# Patient Record
Sex: FEMALE | Race: WHITE | Employment: OTHER | URBAN - METROPOLITAN AREA
[De-identification: names, ages, dates, MRNs, and addresses within clinical notes are randomized per-mention and may not be internally consistent; named-entity substitution may affect disease eponyms.]

---

## 2017-01-04 ENCOUNTER — HOSPITAL ENCOUNTER (OUTPATIENT)
Dept: PHYSICAL THERAPY | Age: 82
Discharge: HOME | End: 2017-01-04
Payer: MEDICARE

## 2017-01-04 PROCEDURE — 97112 NEUROMUSCULAR REEDUCATION: CPT

## 2017-01-04 PROCEDURE — 97110 THERAPEUTIC EXERCISES: CPT

## 2017-01-04 NOTE — PROGRESS NOTES
PT DAILY TREATMENT NOTE - [facility]     Patient Name: Cristiane Belle  Date:2017  : 1935  [x]  Patient  Verified  Payor: [place] MEDICARE / Plan: [place] MEDICARE PART A & B / Product Type: Medicare /    In time:2:07  Out time:2:42  Total Treatment Time (min): 35  Total Timed Codes (min): 35  1:1 Treatment Time ( [address] only): 35   Visit #: 2 of 8    Treatment Area: Unsteadiness on feet [R26.81]    SUBJECTIVE  Pain Level (0-10 scale): 0/10  Any medication changes, allergies to medications, adverse drug reactions, diagnosis change, or new procedure performed?: [x] No    [] Yes (see summary sheet for update)  Subjective functional status/changes:   [] No changes reported  The patient reports occasionally catching toe when walking.     OBJECTIVE    Modality rationale:  to improve the patients ability to    Min Type Additional Details    [] Estim:  []Unatt       []IFC  []Premod                        []Other:  []w/ice   []w/heat  Position:  Location:    [] Estim: []Att    []TENS instruct  []NMES                    []Other:  []w/US   []w/ice   []w/heat  Position:  Location:    []  Traction: [] Cervical       []Lumbar                       [] Prone          []Supine                       []Intermittent   []Continuous Lbs:  [] before manual  [] after manual    []  Ultrasound: []Continuous   [] Pulsed                           []1MHz   []3MHz W/cm2:  Location:    []  Iontophoresis with dexamethasone         Location: [] Take home patch   [] In clinic    []  Ice     []  heat  []  Ice massage  []  Laser   []  Anodyne Position:  Location:    []  Laser with stim  []  Other:  Position:  Location:    []  Vasopneumatic Device Pressure:       [] lo [] med [] hi   Temperature: [] lo [] med [] hi   [] Skin assessment post-treatment:  []intact []redness- no adverse reaction    []redness  adverse reaction:      min []Eval                  []Re-Eval       25 min Therapeutic Exercise:  [x] See flow sheet :   Rationale: increase ROM and increase strength to improve the patients ability to improve ADL ease. 10 min Neuromuscular Re-education:  [x]  See flow sheet :   Rationale: improve coordination, improve balance and increase proprioception  to improve the patients ability to improve ADL ease          With   [] TE   [] TA   [] neuro   [] other: Patient Education: [x] Review HEP    [] Progressed/Changed HEP based on:   [] positioning   [] body mechanics   [] transfers   [] heat/ice application    [] other:      Other Objective/Functional Measures:   First folow-up. Instability noted during gab negotiation and dynamic gait. Held issued HEP until ascertaining pt response next follow up. Pain Level (0-10 scale) post treatment: 0/10    ASSESSMENT/Changes in Function: First follow-up. Patient will continue to benefit from skilled PT services to modify and progress therapeutic interventions, address functional mobility deficits, address ROM deficits, address strength deficits, analyze and address soft tissue restrictions, analyze and cue movement patterns, analyze and modify body mechanics/ergonomics, assess and modify postural abnormalities and instruct in home and community integration to attain remaining goals. []  See Plan of Care  []  See progress note/recertification  []  See Discharge Summary         Progress towards goals / Updated goals:  Short Term Goals: To be accomplished in two weeks:  1. Pt will demonstrate compliance with HEP for self management of symptoms. Long Term Goals: To be accomplished in four weeks:  1. Pt will demonstrate FGA to 25 or greater to decrease falls risk in the community. 2. Pt will demonstrate sharpened Rhomberg to 30 seconds with SBA to improve stability over various terrain.   3. Pt will demonstrate FOTO to 64 or greater to indicate improved functional task completion.       PLAN  []  Upgrade activities as tolerated     [x]  Continue plan of care  []  Update interventions per flow sheet []  Discharge due to:_  []  Other:_      Roly Emmanuel, PT 1/4/2017  4:15 PM    Future Appointments  Date Time Provider Cornell Perez   1/6/2017 2:00 PM Geni Gallo, PT [facility]   1/9/2017 3:00 PM Geni Gallo, PT [facility]   1/11/2017 2:30 PM Geni Gallo, PT [facility]   1/16/2017 2:00 PM Geni Gallo, PT [facility]   1/18/2017 2:00 PM Geni Gallo, PT [facility]

## 2017-01-06 ENCOUNTER — HOSPITAL ENCOUNTER (OUTPATIENT)
Dept: PHYSICAL THERAPY | Age: 82
Discharge: HOME | End: 2017-01-06
Payer: MEDICARE

## 2017-01-06 PROCEDURE — 97112 NEUROMUSCULAR REEDUCATION: CPT

## 2017-01-06 PROCEDURE — 97110 THERAPEUTIC EXERCISES: CPT

## 2017-01-06 NOTE — PROGRESS NOTES
PT DAILY TREATMENT NOTE - [facility]     Patient Name: Vinita Davey  Date:2017  : 1935  [x]  Patient  Verified  Payor: [place] MEDICARE / Plan: VA MEDICARE PART A & B / Product Type: Medicare /    In time:2:00  Out time:2:40  Total Treatment Time (min): 40  Total Timed Codes (min): 40  1:1 Treatment Time ( [address] only): 31  Visit #: 3 of 8    Treatment Area: Unsteadiness on feet [R26.81]    SUBJECTIVE  Pain Level (0-10 scale): 0/10  Any medication changes, allergies to medications, adverse drug reactions, diagnosis change, or new procedure performed?: [x] No    [] Yes (see summary sheet for update)  Subjective functional status/changes:   [] No changes reported  \"Looking forward to the snow. I'm trying to keep myself going so I can enjoy the grandkids. \"    OBJECTIVE      10 min Therapeutic Exercise:  [x] See flow sheet :   Rationale: increase ROM and increase strength to improve the patients ability to increase ease of ADL. 30 min Neuromuscular Re-education:  [x]  See flow sheet :   Rationale: increase strength, improve coordination, improve balance and increase proprioception  to improve the patients ability to maintain dynamic standing balance. With   [] TE   [] TA   [] neuro   [] other: Patient Education: [x] Review HEP    [] Progressed/Changed HEP based on:   [] positioning   [] body mechanics   [] transfers   [] heat/ice application    [] other:      Other Objective/Functional Measures:   Sharps Rhomberg 30\" little sway  - SBA  Cuing Required for 3  Way hip form  Self corrects with supine exercises. Pain Level (0-10 scale) post treatment: 0/10    ASSESSMENT/Changes in Function: Sharps Rhomberg performed without Airex and Modified Sharps Rhomberg performed with little sway and progressed to position 3 with Eyes open and closed on Airex, indicating decreased fall risk with supervision.  Patient demonstrates good ambulation ability with Eyes open and can walk 30' in a straight line with eyes closed. Patient will continue to benefit from skilled PT services to modify and progress therapeutic interventions, address functional mobility deficits, address ROM deficits, address strength deficits, analyze and address soft tissue restrictions, analyze and cue movement patterns, analyze and modify body mechanics/ergonomics and assess and modify postural abnormalities to attain remaining goals. []  See Plan of Care  []  See progress note/recertification  []  See Discharge Summary         Progress towards goals / Updated goals:  Short Term Goals: To be accomplished in two weeks:  1. Pt will demonstrate compliance with HEP for self management of symptoms. Long Term Goals: To be accomplished in four weeks:  1. Pt will demonstrate FGA to 25 or greater to decrease falls risk in the community. 2. Pt will demonstrate sharpened Rhomberg to 30 seconds with SBA to improve stability over various terrain. Met 1/6/2017  3. Pt will demonstrate FOTO to 64 or greater to indicate improved functional task completion.      PLAN  [x]  Upgrade activities as tolerated     [x]  Continue plan of care  [x]  Update interventions per flow sheet       []  Discharge due to:_  []  Other:_  May progress to carioca walking     Torri Rodriguez 1/6/2017  9:56 AM    Future Appointments  Date Time Provider Cornell Perez   1/6/2017 2:00 PM [facility]   1/9/2017 3:00 PM [facility]   1/11/2017 2:30 PM [facility]   1/16/2017 2:00 PM [facility]   1/18/2017 2:00 PM [facility]

## 2017-01-09 ENCOUNTER — APPOINTMENT (OUTPATIENT)
Dept: PHYSICAL THERAPY | Age: 82
End: 2017-01-09
Payer: MEDICARE

## 2017-01-11 ENCOUNTER — HOSPITAL ENCOUNTER (OUTPATIENT)
Dept: PHYSICAL THERAPY | Age: 82
End: 2017-01-11
Payer: MEDICARE

## 2017-01-16 ENCOUNTER — HOSPITAL ENCOUNTER (OUTPATIENT)
Dept: PHYSICAL THERAPY | Age: 82
Discharge: HOME | End: 2017-01-16
Payer: MEDICARE

## 2017-01-16 PROCEDURE — 97112 NEUROMUSCULAR REEDUCATION: CPT

## 2017-01-16 PROCEDURE — 97110 THERAPEUTIC EXERCISES: CPT

## 2017-01-16 NOTE — PROGRESS NOTES
PT DAILY TREATMENT NOTE - [facility]    Patient Name: Jacquie Zazueta  Date:2017  : 1935  [x]  Patient  Verified  Payor: [place] MEDICARE / Plan: VA MEDICARE PART A & B / Product Type: Medicare /    In time:1405  Out time:1438  Total Treatment Time (min): 33  Total Timed Codes (min): 33  1:1 Treatment Time ([facility] only): 33   Visit #: 4 of 8    Treatment Area: Unsteadiness on feet [R26.81]    SUBJECTIVE  Pain Level (0-10 scale): 0  Any medication changes, allergies to medications, adverse drug reactions, diagnosis change, or new procedure performed?: [x] No    [] Yes (see summary sheet for update)  Subjective functional status/changes:   [] No changes reported  Pt reports having a busy weekend. OBJECTIVE     min []Eval                  []Re-Eval     23 min Neuromuscular Re-education:  [x]  See flow sheet :   Rationale: increase strength, improve coordination, improve balance and increase proprioception  to improve the patients ability to improve stability           10 min Therapeutic Exercise:  [x] See flow sheet :   Rationale: increase ROM and increase strength to improve the patients ability to perform ADLs    With   [x] TE   [] TA   [] neuro   [] other: Patient Education: [x] Review HEP    [x] Progressed/Changed HEP based on:   [] positioning   [] body mechanics   [] transfers   [] heat/ice application    [x] other: strengthening      Other Objective/Functional Measures: issued HEP; more instability noted with  Tandem walking    Good gab clearance     Pain Level (0-10 scale) post treatment: 0    ASSESSMENT/Changes in Function:   Pt demonstrates excellent progress with PT thus far, demonstrating improved standing balance and strengthening. She is aware of deficits, and focuses on each exercise for improvement. She reports finding a pair of shoes that she likes better [employer]), and she does not feel like she is tripping over her toes as much. Issued HEP with red band.     Patient will continue to benefit from skilled PT services to modify and progress therapeutic interventions, address functional mobility deficits, address ROM deficits, address strength deficits, analyze and address soft tissue restrictions, analyze and cue movement patterns, analyze and modify body mechanics/ergonomics, assess and modify postural abnormalities and instruct in home and community integration to attain remaining goals. []  See Plan of Care  []  See progress note/recertification  []  See Discharge Summary         Progress towards goals / Updated goals:  Short Term Goals: To be accomplished in two weeks:  1. Pt will demonstrate compliance with HEP for self management of symptoms. Issued HEP 01/16/2017  Long Term Goals: To be accomplished in four weeks:  1. Pt will demonstrate FGA to 25 or greater to decrease falls risk in the community. 2. Pt will demonstrate sharpened Rhomberg to 30 seconds with SBA to improve stability over various terrain. Met 1/6/2017  3. Pt will demonstrate FOTO to 64 or greater to indicate improved functional task completion.     PLAN  [x]  Upgrade activities as tolerated     [x]  Continue plan of care  [x]  Update interventions per flow sheet       []  Discharge due to:_  []  Other:_      Daniel Fine, PT 1/16/2017  2:14 PM    Future Appointments  Date Time Provider Cornell Perez   1/18/2017 2:00 PM [facility]

## 2017-01-18 ENCOUNTER — HOSPITAL ENCOUNTER (OUTPATIENT)
Dept: PHYSICAL THERAPY | Age: 82
Discharge: HOME | End: 2017-01-18
Payer: MEDICARE

## 2017-01-18 PROCEDURE — 97110 THERAPEUTIC EXERCISES: CPT

## 2017-01-18 PROCEDURE — 97112 NEUROMUSCULAR REEDUCATION: CPT

## 2017-01-18 NOTE — PROGRESS NOTES
PT DAILY TREATMENT NOTE - [facility]    Patient Name: Miki Bachelor  Date:2017  : 1935  [x]  Patient  Verified  Payor: [place] MEDICARE / Plan: VA MEDICARE PART A & B / Product Type: Medicare /    In time:1404  Out time:1443  Total Treatment Time (min): 39  Total Timed Codes (min): 39  1:1 Treatment Time ( [address] only): 25   Visit #: 5 of 8    Treatment Area: Unsteadiness on feet [R26.81]    SUBJECTIVE  Pain Level (0-10 scale): 0  Any medication changes, allergies to medications, adverse drug reactions, diagnosis change, or new procedure performed?: [x] No    [] Yes (see summary sheet for update)  Subjective functional status/changes:   [] No changes reported  \"Pretty good, I think. \"    OBJECTIVE     min []Eval                  []Re-Eval     24 min Therapeutic Exercise:  [x] See flow sheet :   Rationale: increase ROM and increase strength to improve the patients ability to perform ADLs    15 min Neuromuscular Re-education:  [x]  See flow sheet :   Rationale: increase strength, improve coordination, improve balance and increase proprioception  to improve the patients ability to improve stability           With   [] TE   [] TA   [] neuro   [] other: Patient Education: [x] Review HEP    [] Progressed/Changed HEP based on:   [] positioning   [] body mechanics   [] transfers   [] heat/ice application    [] other:      Other Objective/Functional Measures:   Improved balance (60 seconds in Rhomberg with EC and MSR with EO)  Improved tandem walk  Added lateral walk   Added more hurdles and closer together  FOTO 57 (1 point improvement)    Pain Level (0-10 scale) post treatment: 0    ASSESSMENT/Changes in Function:   Pt demonstrates improving balance in her new tennis shoes. While she requires SBA with longer balance time (60\"), she is able to still perform well overall. Added more hurdles and decreased space between several, and pt demonstrated L hip circumduction.   FOTO 57.  She reports more confidence when walking in the yard or playing with the dog. Patient will continue to benefit from skilled PT services to modify and progress therapeutic interventions, address functional mobility deficits, address ROM deficits, address strength deficits, analyze and address soft tissue restrictions, analyze and cue movement patterns, analyze and modify body mechanics/ergonomics, assess and modify postural abnormalities and instruct in home and community integration to attain remaining goals. []  See Plan of Care  []  See progress note/recertification  []  See Discharge Summary         Progress towards goals / Updated goals:  Short Term Goals: To be accomplished in two weeks:  1. Pt will demonstrate compliance with HEP for self management of symptoms. Issued HEP 01/16/2017  Long Term Goals: To be accomplished in four weeks:  1. Pt will demonstrate FGA to 25 or greater to decrease falls risk in the community. 2. Pt will demonstrate sharpened Rhomberg to 30 seconds with SBA to improve stability over various terrain. Met 1/6/2017  3. Pt will demonstrate FOTO to 64 or greater to indicate improved functional task completion. 57 01/18/2017    PLAN  [x]  Upgrade activities as tolerated     [x]  Continue plan of care  [x]  Update interventions per flow sheet       []  Discharge due to:_  []  Other:_      Mariano Villarreal, PT 1/18/2017  2:14 PM    No future appointments.

## 2017-01-23 ENCOUNTER — HOSPITAL ENCOUNTER (OUTPATIENT)
Dept: PHYSICAL THERAPY | Age: 82
Discharge: HOME | End: 2017-01-23
Payer: MEDICARE

## 2017-01-23 PROCEDURE — 97116 GAIT TRAINING THERAPY: CPT

## 2017-01-23 PROCEDURE — 97112 NEUROMUSCULAR REEDUCATION: CPT

## 2017-01-23 PROCEDURE — 97110 THERAPEUTIC EXERCISES: CPT

## 2017-01-23 NOTE — PROGRESS NOTES
PT DAILY TREATMENT NOTE - [facility]     Patient Name: Loulou Kimball  Date:2017  : 1935  [x]  Patient  Verified  Payor: [place] MEDICARE / Plan: VA MEDICARE PART A & B / Product Type: Medicare /    In time:12:40  Out time:1:23  Total Treatment Time (min): 43  Total Timed Codes (min): 43  1:1 Treatment Time ([address] only): 44  Visit #:  7 of 8    Treatment Area: Unsteadiness on feet [R26.81]    SUBJECTIVE  Pain Level (0-10 scale): 0/10  Any medication changes, allergies to medications, adverse drug reactions, diagnosis change, or new procedure performed?: [x] No    [] Yes (see summary sheet for update)  Subjective functional status/changes:   [] No changes reported  \"I feel unsteady going down the stairs. \" Patient reports she doesn't trust herself going down the steps. OBJECTIVE      23 min Therapeutic Exercise:  [x] See flow sheet :   Rationale: increase ROM and increase strength to improve the patients ease of ADL. 12 min Neuromuscular Re-education:  [x]  See flow sheet :   Rationale: increase strength, improve coordination, improve balance and increase proprioception  to improve the patients ability to perform functional activities. 8 min Gait Trainin feet with No assistive device on level surfaces with contact guard level of assist   Rationale: to improve balance and proprioception to safely ambulate at home. With   [] TE   [] TA   [] neuro   [] other: Patient Education: [x] Review HEP    [] Progressed/Changed HEP based on:   [] positioning   [] body mechanics   [] transfers   [] heat/ice application    [] other:      Other Objective/Functional Measures: Quad compensation with abduction SLR  In standing . Able to self correct form with standing and plinth exercises after initial cues. Patient demo's sway and bounce with Rhomberg and MSR.     Pain Level (0-10 scale) post treatment: 0/10    ASSESSMENT/Changes in Function: Patient able to self correct form to decrease amount of cuing required. Patient challenged with Rhomberg and MSR eyes closed requiring CGA for proprioceptive input to maintain balance. Patient will continue to benefit from skilled PT services to modify and progress therapeutic interventions, address functional mobility deficits, address ROM deficits, address strength deficits, analyze and cue movement patterns, analyze and modify body mechanics/ergonomics, assess and modify postural abnormalities and address imbalance/dizziness to attain remaining goals. []  See Plan of Care  []  See progress note/recertification  []  See Discharge Summary         Progress towards goals / Updated goals:  1. Pt will demonstrate compliance with HEP for self management of symptoms. Issued HEP 01/16/2017  Long Term Goals: To be accomplished in four weeks:  1. Pt will demonstrate FGA to 25 or greater to decrease falls risk in the community. 2. Pt will demonstrate sharpened Rhomberg to 30 seconds with SBA to improve stability over various terrain. Met 1/6/2017  3. Pt will demonstrate FOTO to 64 or greater to indicate improved functional task completion.  57 01/18/2017    PLAN  []  Upgrade activities as tolerated     [x]  Continue plan of care  []  Update interventions per flow sheet       []  Discharge due to:_  [x]  Other:_   Perform FGA Next visit to assess LTG #2    Yarely Read 1/23/2017  12:49 PM    Future Appointments  Date Time Provider Cornell Perez   1/27/2017 2:30 [facility]   1/31/2017 2:30 PM [facility]

## 2017-01-27 ENCOUNTER — HOSPITAL ENCOUNTER (OUTPATIENT)
Dept: PHYSICAL THERAPY | Age: 82
Discharge: HOME | End: 2017-01-27
Payer: MEDICARE

## 2017-01-27 PROCEDURE — 97112 NEUROMUSCULAR REEDUCATION: CPT

## 2017-01-27 PROCEDURE — 97110 THERAPEUTIC EXERCISES: CPT

## 2017-01-27 NOTE — PROGRESS NOTES
PT DAILY TREATMENT NOTE - [facility]    Patient Name: Raquel Melendez  Date:2017  : 1935  [x]  Patient  Verified  Payor: [place] MEDICARE / Plan: VA MEDICARE PART A & B / Product Type: Medicare /    In time:1435  Out time:1511  Total Treatment Time (min): 36  Total Timed Codes (min): 36  1:1 Treatment Time ( [address] only): 36   Visit #: 7 of 8    Treatment Area: Unsteadiness on feet [R26.81]    SUBJECTIVE  Pain Level (0-10 scale): 0  Any medication changes, allergies to medications, adverse drug reactions, diagnosis change, or new procedure performed?: [x] No    [] Yes (see summary sheet for update)  Subjective functional status/changes:   [] No changes reported  Pt reports no pain. OBJECTIVE     min []Eval                  []Re-Eval     26 min Therapeutic Exercise:  [x] See flow sheet :   Rationale: increase ROM and increase strength to improve the patients ability to perform ADLs    10 min Neuromuscular Re-education:  [x]  See flow sheet :   Rationale: increase strength, improve coordination, improve balance and increase proprioception  to improve the patients ability to improve community stability           With   [] TE   [] TA   [] neuro   [] other: Patient Education: [x] Review HEP    [] Progressed/Changed HEP based on:   [] positioning   [] body mechanics   [] transfers   [] heat/ice application    [] other:      Other Objective/Functional Measures:   FOTO 57  FGA 26/30     Pain Level (0-10 scale) post treatment: 0    ASSESSMENT/Changes in Function:   Pt demonstrates excellent progress in PT thus far. FOTO improved to 57, FGA improved 3 points to 26/30. She is compliant with HEP. Improved balance with sharpened Rhomberg on airex without assistance required. Will continue x1 visit and plan to d/c to independent HEP.     Patient will continue to benefit from skilled PT services to modify and progress therapeutic interventions, address functional mobility deficits, address ROM deficits, address strength deficits, analyze and address soft tissue restrictions, analyze and cue movement patterns, analyze and modify body mechanics/ergonomics, assess and modify postural abnormalities and instruct in home and community integration to attain remaining goals. []  See Plan of Care  [x]  See progress note/recertification  []  See Discharge Summary         Progress towards goals / Updated goals:  Short Term Goals: To be accomplished in two weeks:  1. Pt will demonstrate compliance with HEP for self management of symptoms. Issued HEP 01/16/2017; compliant 01/27/2017  Long Term Goals: To be accomplished in four weeks:  1. Pt will demonstrate FGA to 25 or greater to decrease falls risk in the community. Met, 26/30 01/27/2017  2. Pt will demonstrate sharpened Rhomberg to 30 seconds with SBA to improve stability over various terrain. Met 1/6/2017  3. Pt will demonstrate FOTO to 64 or greater to indicate improved functional task completion.  57 01/18/2017    PLAN  [x]  Upgrade activities as tolerated     [x]  Continue plan of care  [x]  Update interventions per flow sheet       []  Discharge due to:_  []  Other:_      Neeraj Reynoso PT 1/27/2017  2:41 PM    Future Appointments  Date Time Provider Cornell Perez   1/31/2017 2:30 PM Neeraj Reynoso PT [facility]

## 2017-01-27 NOTE — PROGRESS NOTES
[facility]  [address]  [place], [address]  [phone] fax    Continued Plan of Care/ Re-certification for Physical Therapy Services    Patient name: Karey Patch of Care: 2016   Referral source: Nico Carrington MD : 1935    Medical Diagnosis: Unsteadiness on feet [R26.81] Onset Date:Dec 2016    Treatment Diagnosis: Imbalance, general deconditioning   Prior Hospitalization: see medical history Provider#: [id number]   Medications: Verified on Patient summary List   Comorbidities: thyroid problems, glasses, h/o cataracts surgery, h/o glaucoma, h/o macular degeneration, MVP  Prior Level of Function: Likes to work outside, walk her dog, h/o falls    Visits from Start of Care: 7    Missed Visits: 0    The Plan of Care and following information is based on the patient's current status:  Short Term Goals: To be accomplished in two weeks:  1. Pt will demonstrate compliance with HEP for self management of symptoms. Issued HEP 2017; compliant 2017  Long Term Goals: To be accomplished in four weeks:  1. Pt will demonstrate FGA to 25 or greater to decrease falls risk in the community. Met, 2017  2. Pt will demonstrate sharpened Rhomberg to 30 seconds with SBA to improve stability over various terrain. Met 2017  3. Pt will demonstrate FOTO to 64 or greater to indicate improved functional task completion.  57 2017    Key functional changes:   FOTO 57  FGA 26  HEP: compliant  Sharpened Rhomberg: able to hold 60 seconds on airex without assistance    Problems/ barriers to goal attainment: Proprioception/age related changes    Problem List: decrease strength, impaired gait/ balance and decrease ADL/ functional abilitiies    Treatment Plan: Therapeutic exercise, Therapeutic activities, Neuromuscular re-education, Physical agent/modality, Gait/balance training, Manual therapy, Patient education, Self Care training, Functional mobility training, Home safety training and Stair training     Patient Goal (s) has been updated and includes: \"I think after next visit, I'll be ready to finish. You gave me a lot of stuff to do on my own. \"     Goals for this certification period to be accomplished in 1 treatments:  1. Pt will demonstrate compliance with final HEP for self management of symptoms. Frequency / Duration: Patient to be seen 1 times per week for 1 treatments:    Assessment / Recommendations:  Pt demonstrates excellent progress in PT thus far. FOTO improved to 57, FGA improved 3 points to 30. She is compliant with HEP. Improved balance with sharpened Rhomberg on airex without assistance required. Will continue x1 visit and plan to d/c to independent HEP. G-Codes (GP)  Mobility  [id number] Current  CK= 40-59%  [id number] Goal  CJ= 20-39%    The severity rating is based on clinical judgment and the FOTO Score score. Certification Period: 30 days (2017 - 2017)    Lova Apgar, PT 2017 3:02 PM    ________________________________________________________________________  I certify that the above Therapy Services are being furnished while the patient is under my care. I agree with the treatment plan and certify that this therapy is necessary. [] I have read the above and request that my patient continue as recommended.   [] I have read the above report and request that my patient continue therapy with the following changes/special instructions: _____________________________________________  [] I have read the above report and request that my patient be discharged from therapy    Physician's Signature:____________________________________Date:___________Time:__________    Please sign and return to [facility] [address] [address] [address]  [place], [address]  [phone] fax

## 2017-01-31 ENCOUNTER — HOSPITAL ENCOUNTER (OUTPATIENT)
Dept: PHYSICAL THERAPY | Age: 82
Discharge: HOME | End: 2017-01-31
Payer: MEDICARE

## 2017-01-31 PROCEDURE — 97112 NEUROMUSCULAR REEDUCATION: CPT

## 2017-01-31 PROCEDURE — G8980 MOBILITY D/C STATUS: HCPCS

## 2017-01-31 PROCEDURE — 97110 THERAPEUTIC EXERCISES: CPT

## 2017-01-31 PROCEDURE — G8979 MOBILITY GOAL STATUS: HCPCS

## 2017-01-31 NOTE — PROGRESS NOTES
[facility]  [address]  [place], [address]  [phone] fax    Physical Therapy Discharge Summary    Patient name: Rola Oliver of Care: 2016   Referral source: Giuseppe Tanner MD : 1935    Medical Diagnosis: Unsteadiness on feet [R26.81] Onset Date:Dec 2016    Treatment Diagnosis: Imbalance, general deconditioning   Prior Hospitalization: see medical history Provider#: [id number]   Medications: Verified on Patient summary List   Comorbidities: thyroid problems, glasses, h/o cataracts surgery, h/o glaucoma, h/o macular degeneration, MVP  Prior Level of Function: Likes to work outside, walk her dog, h/o falls    Visits from Start of Care: 8    Missed Visits: 0  Reporting Period : 2017 to 2017    Summary of Care:  Goals for this certification period to be accomplished in 1 treatments:  1. Pt will demonstrate compliance with final HEP for self management of symptoms. Reviewed HEP 2017    G-Codes (GP)  Mobility    Goal  CK= 40-59%  D/C  CJ= 20-39%    The severity rating is based on clinical judgment and the FOTO score. ASSESSMENT/RECOMMENDATIONS: Pt demonstrated some difficulty with gab steps today, attempting to rush through them. She demonstrates improved overall balance in Rhomberg, MSR, and with one foot on the stairs; able to negotiate the stairs without difficulty. Will d/c to independent HEP at this time.   [x]Discontinue therapy: [x]Patient has reached or is progressing toward set goals      []Patient is non-compliant or has abdicated      []Due to lack of appreciable progress towards set goals    Jose Thompson, PT 2017 2:38 PM

## 2017-01-31 NOTE — PROGRESS NOTES
PT DAILY TREATMENT NOTE - [facility] 3-16    Patient Name: Don Altman  Date:2017  : 1935  [x]  Patient  Verified  [place]or: [place] MED[place] / Plan: [place] MEDICARE PART A & B / Product Type: Medicare /    In time:1428  Out time:1506  Total Treatment Time (min): 38  Total Timed Codes (min): 38  1:1 Treatment Time (Familia Rook only): 24   Visit #: 1 of 1    Treatment Area: Unsteadiness on feet [R26.81]    SUBJECTIVE  Pain Level (0-10 scale): 0  Any medication changes, allergies to medications, adverse drug reactions, diagnosis change, or new procedure performed?: [x] No    [] Yes (see summary sheet for update)  Subjective functional status/changes:   [] No changes reported  Pt reports no changes. OBJECTIVE     min []Eval                  []Re-Eval     28 min Therapeutic Exercise:  [x] See flow sheet :   Rationale: increase ROM and increase strength to improve the patients ability to perform ADLs    10 min Neuromuscular Re-education:  [x]  See flow sheet :   Rationale: increase strength, improve coordination, improve balance and increase proprioception  to improve the patients ability to improve stability           With   [] TE   [] TA   [] neuro   [] other: Patient Education: [x] Review HEP    [] Progressed/Changed HEP based on:   [] positioning   [] body mechanics   [] transfers   [] heat/ice application    [] other:      Other Objective/Functional Measures:   Reviewed issued HEP     Pain Level (0-10 scale) post treatment: 0    ASSESSMENT/Changes in Function:   Pt demonstrated some difficulty with gab steps today, attempting to rush through them. She demonstrates improved overall balance in Rhomberg, MSR, and with one foot on the stairs; able to negotiate the stairs without difficulty. Will d/c to independent HEP at this time.      []  See Plan of Care  []  See progress note/recertification  [x]  See Discharge Summary         Progress towards goals / Updated goals:     Goals for this certification period to be accomplished in 1 treatments:  1. Pt will demonstrate compliance with final HEP for self management of symptoms. Reviewed HEP 01/31/2017       PLAN  []  Upgrade activities as tolerated     []  Continue plan of care  []  Update interventions per flow sheet       [x]  Discharge due to completion of program  []  Other:Jones Lamb, PT 1/31/2017  2:30 PM    No future appointments.

## 2017-08-03 ENCOUNTER — HOSPITAL ENCOUNTER (OUTPATIENT)
Dept: VASCULAR SURGERY | Age: 82
Discharge: HOME | End: 2017-08-03
Attending: INTERNAL MEDICINE
Payer: MEDICARE

## 2017-08-03 DIAGNOSIS — R42 DIZZINESS AND GIDDINESS: ICD-10-CM

## 2017-08-03 PROCEDURE — 93880 EXTRACRANIAL BILAT STUDY: CPT

## 2017-08-03 NOTE — PROCEDURES
[facility] [place]  *** FINAL REPORT ***    Name: Lindsay Samuel  MRN: [id number]    Outpatient  : 10 Nov 1935  HIS Order #: [id number]  [address] [facility] Visit #: [id number]  Date: 03 Aug 2017    TYPE OF TEST: Cerebrovascular Duplex    REASON FOR TEST    Right Carotid:-             Proximal               Mid                 Distal  cm/s  Systolic  Diastolic  Systolic  Diastolic  Systolic  Diastolic  CCA:     87.1      23.0      118.0      35.0       81.0      25.0  Bulb:  ECA:    100.0      16.0  ICA:     77.0      25.0       90.0      39.0       86.0      31.0  ICA/CCA:  0.8       1.1    ICA Stenosis: <50%    Right Vertebral:-  Finding: Antegrade  Sys:       69.0  Trupti:       20.0    Right Subclavian: Normal    Left Carotid:-            Proximal                Mid                 Distal  cm/s  Systolic  Diastolic  Systolic  Diastolic  Systolic  Diastolic  CCA:    184.9      29.0       96.0      27.0       96.0      31.0  Bulb:  ECA:     81.0      12.0  ICA:     55.0      20.0       65.0      25.0       63.0      27.0  ICA/CCA:  0.6       0.9    ICA Stenosis: <50%    Left Vertebral:-  Finding: Antegrade  Sys:       50.0  Trupti:       12.0    Left Subclavian: Normal    INTERPRETATION/FINDINGS  Duplex images were obtained using 2-D gray scale, color flow, and  spectral Doppler analysis. 1. Bilateral <50% stenosis of the internal carotid arteries. 2. No significant stenosis in the external carotid arteries  bilaterally. 3. Antegrade flow in both vertebral arteries. 4. Normal flow in both subclavian arteries. Plaque Morphology: 1. Mild hyperechoic plaque in the bulb and right ICA. 2.Mild hyperechoic plaque in the bulb and left ICA. ADDITIONAL COMMENTS    I have personally reviewed the data relevant to the interpretation of  this  study. TECHNOLOGIST: Zeb Apple, [facility], RVT/  Signed: 2017 02:48 PM    PHYSICIAN: Rey Fleischer L. Raymund Gess, MD  Signed: 2017 06:13 PM

## 2017-10-15 ENCOUNTER — HOSPITAL ENCOUNTER (OUTPATIENT)
Age: 82
Discharge: HOME | End: 2017-10-15
Attending: INTERNAL MEDICINE
Payer: MEDICARE

## 2017-10-15 DIAGNOSIS — R42 DIZZINESS AND GIDDINESS: ICD-10-CM

## 2017-10-15 PROCEDURE — 70551 MRI BRAIN STEM W/O DYE: CPT

## 2019-07-10 ENCOUNTER — OFFICE VISIT (OUTPATIENT)
Dept: CARDIOLOGY CLINIC | Age: 84
End: 2019-07-10

## 2019-07-10 VITALS
WEIGHT: 151 LBS | HEIGHT: 65 IN | DIASTOLIC BLOOD PRESSURE: 88 MMHG | HEART RATE: 82 BPM | SYSTOLIC BLOOD PRESSURE: 140 MMHG | OXYGEN SATURATION: 98 % | BODY MASS INDEX: 25.16 KG/M2

## 2019-07-10 DIAGNOSIS — E78.00 HYPERCHOLESTEREMIA: ICD-10-CM

## 2019-07-10 DIAGNOSIS — R00.2 PALPITATION: ICD-10-CM

## 2019-07-10 DIAGNOSIS — R55 SYNCOPE, UNSPECIFIED SYNCOPE TYPE: Primary | ICD-10-CM

## 2019-07-10 DIAGNOSIS — R42 DIZZINESS: ICD-10-CM

## 2019-07-10 RX ORDER — NETARSUDIL 0.2 MG/ML
1 SOLUTION/ DROPS OPHTHALMIC; TOPICAL DAILY
COMMUNITY
Start: 2019-05-09

## 2019-07-10 RX ORDER — LEVOTHYROXINE SODIUM 137 UG/1
137 TABLET ORAL DAILY
COMMUNITY
Start: 2019-05-20

## 2019-07-10 RX ORDER — ROSUVASTATIN CALCIUM 10 MG/1
10 TABLET, COATED ORAL DAILY
COMMUNITY
Start: 2019-06-07

## 2019-07-10 NOTE — PROGRESS NOTES
Lilliana Florez presents today for   Chief Complaint   Patient presents with   [facility] Follow Up    Syncope     5-6 episodes in the past year        Lilliana Florez preferred language for health care discussion is english/other. Is someone accompanying this pt? yes    Is the patient using any DME equipment during OV? no    Depression Screening:  No flowsheet data found. Learning Assessment:  Learning Assessment 7/10/2019   PRIMARY LEARNER Patient   BARRIERS PRIMARY LEARNER NONE   CO-LEARNER CAREGIVER No   PRIMARY LANGUAGE ENGLISH   LEARNER PREFERENCE PRIMARY READING   ANSWERED BY patient   RELATIONSHIP SELF       Abuse Screening:  Abuse Screening Questionnaire 7/10/2019   Do you ever feel afraid of your partner? N   Are you in a relationship with someone who physically or mentally threatens you? N   Is it safe for you to go home? Y       Fall Risk  Fall Risk Assessment, last 12 mths 7/10/2019   Able to walk? Yes   Fall in past 12 months? No       Pt currently taking Anticoagulant therapy? no    Coordination of Care:  1. Have you been to the ER, urgent care clinic since your last visit? Hospitalized since your last visit? yes    2. Have you seen or consulted any other health care providers outside of the [address] [place] [address] since your last visit? Include any pap smears or colon screening.  yes

## 2019-07-10 NOTE — PROGRESS NOTES
HISTORY OF PRESENT ILLNESS  Sade Aguilar is a 82 y.o. female. HPI    She is self-referred for the evaluation of syncope. She has had occasional episodes of syncope for a number of years which has been more frequent in the recent six months or so. The syncope is most of the time associated with nausea and some urgency to have a bowel movement. At times, she feels clammy. She does not pass out every time. At times she can sit down and the episode resolves on its own. There is no chest pain or palpitation associated with the syncopal episode. Following the episode of fainting spells, she feels somewhat fatigued but no significant vertigo symptoms. She denies has had no chest pain, dyspnea, orthopnea, PND. She has had some palpitation as a flip flop or fluttering lasting for five to ten seconds most of the time. This has not been more frequent lately. She has had no symptoms to indicate claudication, TIA or amaurosis fugax. She is a nonsmoker. She has no history of hypertension or diabetes mellitus. She is not on any medications for blood pressure or sugar. She denies a family history of coronary artery disease. Review of Systems   Constitutional: Negative for malaise/fatigue and weight loss. HENT: Negative for hearing loss. Eyes: Negative for blurred vision and double vision. Respiratory: Negative for shortness of breath. Cardiovascular: Positive for palpitations. Negative for chest pain, orthopnea, claudication, leg swelling and PND. Gastrointestinal: Negative for blood in stool, heartburn and melena. Genitourinary: Negative for dysuria, frequency, hematuria and urgency. Musculoskeletal: Negative for back pain and joint pain. Skin: Negative for itching and rash. Neurological: Positive for dizziness and loss of consciousness. Psychiatric/Behavioral: Negative for depression and memory loss. Physical Exam   Constitutional: She is oriented to person, place, and time.  She appears well-developed and well-nourished. HENT:   Head: Normocephalic and atraumatic. Eyes: Pupils are equal, round, and reactive to light. Conjunctivae are normal.   Neck: Normal range of motion. Neck supple. No JVD present. Cardiovascular: Normal rate, regular rhythm, S1 normal and S2 normal.  No extrasystoles are present. PMI is not displaced. Exam reveals no gallop and no friction rub. No murmur heard. Pulses:       Carotid pulses are 3+ on the right side, and 3+ on the left side. Pulmonary/Chest: Effort normal. She has no rales. Abdominal: Soft. There is no tenderness. Musculoskeletal: She exhibits no edema. Neurological: She is alert and oriented to person, place, and time. No cranial nerve deficit. Skin: Skin is warm and dry. Psychiatric: She has a normal mood and affect. Her behavior is normal.     Visit Vitals  /88   Pulse 82   Ht 5' 5\" (1.651 m)   Wt 68.5 kg (151 lb)   SpO2 98%   BMI 25.13 kg/m²       No past medical history on file.     Social History     Socioeconomic History    Marital status:      Spouse name: Not on file    Number of children: Not on file    Years of education: Not on file    Highest education level: Not on file   Occupational History    Not on file   Social Needs    Financial resource strain: Not on file    Food insecurity:     Worry: Not on file     Inability: Not on file    Transportation needs:     Medical: Not on file     Non-medical: Not on file   Tobacco Use    Smoking status: Never Smoker    Smokeless tobacco: Never Used   Substance and Sexual Activity    Alcohol use: Not on file    Drug use: Not on file    Sexual activity: Not on file   Lifestyle    Physical activity:     Days per week: Not on file     Minutes per session: Not on file    Stress: Not on file   Relationships    Social connections:     Talks on phone: Not on file     Gets together: Not on file     Attends Jew service: Not on file     Active member of club or organization: Not on file     Attends meetings of clubs or organizations: Not on file     Relationship status: Not on file    Intimate partner violence:     Fear of current or ex partner: Not on file     Emotionally abused: Not on file     Physically abused: Not on file     Forced sexual activity: Not on file   Other Topics Concern    Not on file   Social History Narrative    Not on file       No family history on file. No past surgical history on file. Current Outpatient Medications   Medication Sig Dispense Refill    rosuvastatin (CRESTOR) 10 mg tablet       LEVOXYL 137 mcg tablet       RHOPRESSA 0.02 % drop          EKG: normal EKG, normal sinus rhythm, unchanged from previous tracings, nonspecific ST and T waves changes, left axis deviation, rotation of the heart  . ASSESSMENT and PLAN  Encounter Diagnoses   Name Primary?  Syncope, unspecified syncope type Yes    Dizziness     Palpitation     Hypercholesteremia    The etiology of this patient's syncope has not been clearly defined. The most likely case appears to be vagal syncope as has been associated with nausea and some diaphoresis along with a urge to have bowel movement. She does not have evidence of orthostatic hypotension on today's examination. Her blood pressure was 140/82 supine and 160/102 on standing. She has had no symptoms to indicate significant bradycardia or tachyarrhythmia but this will have to be further evaluated with a heart monitor. She does not have any clinical findings of severe aortic stenosis or other valvular heart disease. She has no physical findings of hypertrophic cardiomyopathy and no EKG evidence of ventricular hypertrophy. She was once told that she has mitral valve prolapse, but she has no physical findings to indicate mitral valve prolapse on today's examination. Particularly, she does not have any regurgitation murmur of the mitral valve.  She has no other symptoms to indicate any possibility of autonomic nerve dysfunction. At this point, I would proceed with an echocardiogram and a 30-day event recorder. If a 30-day event recorder failed to demonstrate a significant degree of bradycardia or tachyarrhythmia with symptoms, then we could consider further noncardiac issues. If she does not have any symptoms during the 30 days, then we may consider an implantable loop recorder.

## 2019-08-05 DIAGNOSIS — R55 SYNCOPE, UNSPECIFIED SYNCOPE TYPE: ICD-10-CM

## 2019-08-05 DIAGNOSIS — R42 DIZZINESS: ICD-10-CM

## 2019-09-04 ENCOUNTER — HOSPITAL ENCOUNTER (OUTPATIENT)
Dept: NON INVASIVE DIAGNOSTICS | Age: 84
Discharge: HOME | End: 2019-09-04
Attending: INTERNAL MEDICINE
Payer: MEDICARE

## 2019-09-04 VITALS
SYSTOLIC BLOOD PRESSURE: 140 MMHG | BODY MASS INDEX: 25.16 KG/M2 | DIASTOLIC BLOOD PRESSURE: 88 MMHG | HEIGHT: 65 IN | WEIGHT: 151 LBS

## 2019-09-04 DIAGNOSIS — R55 SYNCOPE, UNSPECIFIED SYNCOPE TYPE: ICD-10-CM

## 2019-09-04 DIAGNOSIS — R42 DIZZINESS: ICD-10-CM

## 2019-09-04 LAB
ECHO AO ROOT DIAM: 3.09 CM
ECHO LA AREA 4C: 15.5 CM2
ECHO LA VOL 2C: 59.11 ML (ref 22–52)
ECHO LA VOL 4C: 32.17 ML (ref 22–52)
ECHO LA VOL BP: 47.11 ML (ref 22–52)
ECHO LA VOL/BSA BIPLANE: 26.84 ML/M2 (ref 16–28)
ECHO LA VOLUME INDEX A2C: 33.67 ML/M2 (ref 16–28)
ECHO LA VOLUME INDEX A4C: 18.33 ML/M2 (ref 16–28)
ECHO LV E' LATERAL VELOCITY: 6.82 CM/S
ECHO LV E' SEPTAL VELOCITY: 5.86 CM/S
ECHO LV GLOBAL LONGITUDINAL STRAIN (GLS): 17.4 %
ECHO LV INTERNAL DIMENSION DIASTOLIC: 4.04 CM (ref 3.9–5.3)
ECHO LV INTERNAL DIMENSION SYSTOLIC: 2.82 CM
ECHO LV IVSD: 0.89 CM (ref 0.6–0.9)
ECHO LV MASS 2D: 127.5 G (ref 67–162)
ECHO LV MASS INDEX 2D: 72.6 G/M2 (ref 43–95)
ECHO LV POSTERIOR WALL DIASTOLIC: 0.94 CM (ref 0.6–0.9)
ECHO LVOT DIAM: 1.78 CM
ECHO LVOT PEAK GRADIENT: 4.3 MMHG
ECHO LVOT PEAK VELOCITY: 103.17 CM/S
ECHO LVOT VTI: 13.89 CM
ECHO MV A VELOCITY: 88.31 CM/S
ECHO MV E DECELERATION TIME (DT): 298 MS
ECHO MV E VELOCITY: 60.16 CM/S
ECHO MV E/A RATIO: 0.68
ECHO MV E/E' LATERAL: 8.82
ECHO MV E/E' RATIO (AVERAGED): 9.54
ECHO MV E/E' SEPTAL: 10.27
ECHO PULMONARY ARTERY SYSTOLIC PRESSURE (PASP): 25 MMHG
ECHO RV TAPSE: 2.21 CM (ref 1.5–2)
ECHO TV REGURGITANT MAX VELOCITY: 232.29 CM/S
ECHO TV REGURGITANT PEAK GRADIENT: 21.6 MMHG

## 2019-09-04 PROCEDURE — 93306 TTE W/DOPPLER COMPLETE: CPT

## 2019-09-17 ENCOUNTER — OFFICE VISIT (OUTPATIENT)
Dept: CARDIOLOGY CLINIC | Age: 84
End: 2019-09-17

## 2019-09-17 VITALS
HEIGHT: 65 IN | WEIGHT: 148 LBS | OXYGEN SATURATION: 95 % | DIASTOLIC BLOOD PRESSURE: 92 MMHG | SYSTOLIC BLOOD PRESSURE: 126 MMHG | HEART RATE: 78 BPM | BODY MASS INDEX: 24.66 KG/M2

## 2019-09-17 DIAGNOSIS — R00.2 PALPITATION: ICD-10-CM

## 2019-09-17 DIAGNOSIS — R42 DIZZINESS: ICD-10-CM

## 2019-09-17 DIAGNOSIS — I49.5 TACHYCARDIA-BRADYCARDIA SYNDROME (HCC): ICD-10-CM

## 2019-09-17 DIAGNOSIS — E78.00 HYPERCHOLESTEREMIA: ICD-10-CM

## 2019-09-17 DIAGNOSIS — R55 SYNCOPE, UNSPECIFIED SYNCOPE TYPE: Primary | ICD-10-CM

## 2019-09-17 DIAGNOSIS — Z01.812 PRE-PROCEDURE LAB EXAM: ICD-10-CM

## 2019-09-17 RX ORDER — METOPROLOL TARTRATE 25 MG/1
25 TABLET, FILM COATED ORAL 2 TIMES DAILY
Qty: 60 TAB | Refills: 6 | Status: CANCELLED | OUTPATIENT
Start: 2019-09-17

## 2019-09-17 NOTE — PATIENT INSTRUCTIONS
[facility]          Patient  EP Instructions                  1. You are scheduled to have a Dual Chamber Pacemaker Implanted on  9/30/2019 , at 10:30am.    Please check in at 09:30 am.    2. Please go to [facility] and chapo in the outpatient parking lot that is located around to the back of the hospital and enter through the [facility] building. Once you enter through the [facility] check in with the  there. The  will either give you directions or assist you in getting to the cath holding area. 3.  You are not to eat or drink anything after midnight the night before your procedure. 4. Please continue to take your medications with a small sip of water on the morning of the procedure with the following exceptions:  NONE    5. If you are diabetic, do not take your insulin/sugar pill the morning of the procedure. 6. We encourage families to wait in the waiting room on the first floor while the procedure is being done. The Doctor will come out and talk with you as soon as the procedure is over. 7. There is the possibility that you may spend the night in the hospital, depending on the results of the procedure. This will be determined after the procedure is done. 8.   If you or your family have any questions, please call our office Monday-Friday 9:00am         -4:30 pm , at [phone], and ask to speak to one of the nurses.

## 2019-09-17 NOTE — PROGRESS NOTES
HISTORY OF PRESENT ILLNESS  Isa Mota is a 82 y.o. female. HPI   She has had no recurrent episodes of syncope in the past two months but continues with occasional dizziness. She has had no chest pain, dyspnea, orthopnea, PND. She denies palpitation. She has had no symptoms to indicate TIA or amaurosis fugax. She underwent echocardiogram on 9/4/19 which demonstrated normal left ventricular systolic function with EF in the 55-60% range. There was no significant valvular pathology and no evidence of mitral valve prolapse. There was no significant pulmonary hypertension. She underwent a 30-day event recorder which demonstrated frequent episodes of nonsustained supraventricular tachycardia up to 169 bpm. She also has had sinus bradycardia in the low to mid 40's mostly during the night in sleep but also occurring in the daytime as well. There were no significant pauses or sustained ventricular tachycardia. She has had occasional episodes of syncope for a number of years which has been more frequent in the recent six months or so. She is a nonsmoker. She has no history of hypertension or diabetes mellitus. She is not on any medications for blood pressure or sugar. She denies a family history of coronary artery disease. Review of Systems   Constitutional: Negative for malaise/fatigue and weight loss. HENT: Negative for hearing loss. Eyes: Negative for blurred vision and double vision. Respiratory: Negative for shortness of breath. Cardiovascular: Negative for chest pain, palpitations, orthopnea, claudication, leg swelling and PND. Gastrointestinal: Negative for blood in stool, heartburn and melena. Genitourinary: Negative for dysuria, frequency, hematuria and urgency. Musculoskeletal: Negative for back pain and joint pain. Skin: Negative for itching and rash. Neurological: Positive for loss of consciousness. Negative for dizziness.    Psychiatric/Behavioral: Negative for depression and memory loss. Physical Exam   Constitutional: She is oriented to person, place, and time. She appears well-developed and well-nourished. HENT:   Head: Normocephalic and atraumatic. Eyes: Pupils are equal, round, and reactive to light. Conjunctivae are normal.   Neck: Normal range of motion. Neck supple. No JVD present. Cardiovascular: Normal rate, regular rhythm, S1 normal and S2 normal.  No extrasystoles are present. PMI is not displaced. Exam reveals no gallop and no friction rub. No murmur heard. Pulses:       Carotid pulses are 3+ on the right side, and 3+ on the left side. Pulmonary/Chest: Effort normal. She has no rales. Abdominal: Soft. There is no tenderness. Musculoskeletal: She exhibits no edema. Neurological: She is alert and oriented to person, place, and time. No cranial nerve deficit. Skin: Skin is warm and dry. Psychiatric: She has a normal mood and affect. Her behavior is normal.     Visit Vitals  BP (!) 126/92   Pulse 78   Ht 5' 5\" (1.651 m)   Wt 67.1 kg (148 lb)   SpO2 95%   BMI 24.63 kg/m²       No past medical history on file.     Social History     Socioeconomic History    Marital status:      Spouse name: Not on file    Number of children: Not on file    Years of education: Not on file    Highest education level: Not on file   Occupational History    Not on file   Social Needs    Financial resource strain: Not on file    Food insecurity:     Worry: Not on file     Inability: Not on file    Transportation needs:     Medical: Not on file     Non-medical: Not on file   Tobacco Use    Smoking status: Never Smoker    Smokeless tobacco: Never Used   Substance and Sexual Activity    Alcohol use: Not on file    Drug use: Not on file    Sexual activity: Not on file   Lifestyle    Physical activity:     Days per week: Not on file     Minutes per session: Not on file    Stress: Not on file   Relationships    Social connections:     Talks on phone: Not on file     Gets together: Not on file     Attends Bahai service: Not on file     Active member of club or organization: Not on file     Attends meetings of clubs or organizations: Not on file     Relationship status: Not on file    Intimate partner violence:     Fear of current or ex partner: Not on file     Emotionally abused: Not on file     Physically abused: Not on file     Forced sexual activity: Not on file   Other Topics Concern    Not on file   Social History Narrative    Not on file       No family history on file. No past surgical history on file. Current Outpatient Medications   Medication Sig Dispense Refill    rosuvastatin (CRESTOR) 10 mg tablet       LEVOXYL 137 mcg tablet       RHOPRESSA 0.02 % drop          EKG: normal EKG, normal sinus rhythm, unchanged from previous tracings, nonspecific ST and T waves changes, left axis deviation. ASSESSMENT and PLAN  Encounter Diagnoses   Name Primary?  Syncope, unspecified syncope type Yes    Tachycardia-bradycardia syndrome ([address]     Dizziness     Palpitation     Hypercholesteremia     Pre-procedure lab exam    The etiology of this patient's syncope is not quite clear although it could be possibly a case of vagal syncope. She has been found to have frequent episodes of nonsustained supraventricular tachycardia along with intermittent sinus bradycardia. The bradycardia and tachycardia not enough to account for her syncope thus far. It would be very difficult to treat this patient's tachycardia with the beta blocker or antiarrhythmics because of intermittent sinus bradycardia. There is a possibility that she could have had more severe profound bradycardia to account for her syncope, yet has not been documented. At this point I would proceed with the pacemaker implantation followed by beta blocker treatment or antiarrhythmics for supraventricular tachycardia.  We could consider an EP study for further evaluation of tachyarrhythmia or sick sinus syndrome but it appears that we have enough evidence to justify the pacemaker implantation.

## 2019-09-17 NOTE — PROGRESS NOTES
Stephy Dolaure presents today for   Chief Complaint   Patient presents with    Results     Event recorder 7/18/2019-8/16/2019    Dizziness     on/off        Stephy Henderson preferred language for health care discussion is english/other. Is someone accompanying this pt? yes    Is the patient using any DME equipment during OV? no    Depression Screening:  No flowsheet data found. Learning Assessment:  Learning Assessment 7/10/2019   PRIMARY LEARNER Patient   BARRIERS PRIMARY LEARNER NONE   CO-LEARNER CAREGIVER No   PRIMARY LANGUAGE ENGLISH   LEARNER PREFERENCE PRIMARY READING   ANSWERED BY patient   RELATIONSHIP SELF       Abuse Screening:  Abuse Screening Questionnaire 7/10/2019   Do you ever feel afraid of your partner? N   Are you in a relationship with someone who physically or mentally threatens you? N   Is it safe for you to go home? Y       Fall Risk  Fall Risk Assessment, last 12 mths 7/10/2019   Able to walk? Yes   Fall in past 12 months? No       Pt currently taking Anticoagulant therapy? no    Coordination of Care:  1. Have you been to the ER, urgent care clinic since your last visit? Hospitalized since your last visit? no    2. Have you seen or consulted any other health care providers outside of the [address] [place] [address] since your last visit? Include any pap smears or colon screening.  no

## 2019-09-23 ENCOUNTER — HOSPITAL ENCOUNTER (OUTPATIENT)
Dept: GENERAL RADIOLOGY | Age: 84
Discharge: HOME | End: 2019-09-23
Payer: MEDICARE

## 2019-09-23 ENCOUNTER — HOSPITAL ENCOUNTER (OUTPATIENT)
Dept: LAB | Age: 84
Discharge: HOME | End: 2019-09-23
Payer: MEDICARE

## 2019-09-23 DIAGNOSIS — Z01.812 PRE-PROCEDURE LAB EXAM: ICD-10-CM

## 2019-09-23 DIAGNOSIS — R55 SYNCOPE, UNSPECIFIED SYNCOPE TYPE: ICD-10-CM

## 2019-09-23 LAB
ALBUMIN SERPL-MCNC: 3.8 G/DL (ref 3.4–5)
ALBUMIN/GLOB SERPL: 1.2 {RATIO} (ref 0.8–1.7)
ALP SERPL-CCNC: 66 U/L (ref 45–117)
ALT SERPL-CCNC: 25 U/L (ref 13–56)
ANION GAP SERPL CALC-SCNC: 5 MMOL/L (ref 3–18)
AST SERPL-CCNC: 18 U/L (ref 10–38)
BASOPHILS # BLD: 0 K/UL (ref 0–0.1)
BASOPHILS NFR BLD: 0 % (ref 0–2)
BILIRUB SERPL-MCNC: 0.4 MG/DL (ref 0.2–1)
BUN SERPL-MCNC: 27 MG/DL (ref 7–18)
BUN/CREAT SERPL: 33 (ref 12–20)
CALCIUM SERPL-MCNC: 9.6 MG/DL (ref 8.5–10.1)
CHLORIDE SERPL-SCNC: 109 MMOL/L (ref 100–111)
CO2 SERPL-SCNC: 30 MMOL/L (ref 21–32)
CREAT SERPL-MCNC: 0.82 MG/DL (ref 0.6–1.3)
DIFFERENTIAL METHOD BLD: ABNORMAL
EOSINOPHIL # BLD: 0.2 K/UL (ref 0–0.4)
EOSINOPHIL NFR BLD: 2 % (ref 0–5)
ERYTHROCYTE [DISTWIDTH] IN BLOOD BY AUTOMATED COUNT: 13.6 % (ref 11.6–14.5)
GLOBULIN SER CALC-MCNC: 3.3 G/DL (ref 2–4)
GLUCOSE SERPL-MCNC: 82 MG/DL (ref 74–99)
HCT VFR BLD AUTO: 38.6 % (ref 35–45)
HGB BLD-MCNC: 12.6 G/DL (ref 12–16)
INR PPP: 0.9 (ref 0.8–1.2)
LYMPHOCYTES # BLD: 2.2 K/UL (ref 0.9–3.6)
LYMPHOCYTES NFR BLD: 32 % (ref 21–52)
MCH RBC QN AUTO: 29.6 PG (ref 24–34)
MCHC RBC AUTO-ENTMCNC: 32.6 G/DL (ref 31–37)
MCV RBC AUTO: 90.8 FL (ref 74–97)
MONOCYTES # BLD: 0.8 K/UL (ref 0.05–1.2)
MONOCYTES NFR BLD: 11 % (ref 3–10)
NEUTS SEG # BLD: 3.8 K/UL (ref 1.8–8)
NEUTS SEG NFR BLD: 55 % (ref 40–73)
PLATELET # BLD AUTO: 264 K/UL (ref 135–420)
PMV BLD AUTO: 10.6 FL (ref 9.2–11.8)
POTASSIUM SERPL-SCNC: 4.1 MMOL/L (ref 3.5–5.5)
PROT SERPL-MCNC: 7.1 G/DL (ref 6.4–8.2)
PROTHROMBIN TIME: 12 SEC (ref 11.5–15.2)
RBC # BLD AUTO: 4.25 M/UL (ref 4.2–5.3)
SODIUM SERPL-SCNC: 144 MMOL/L (ref 136–145)
WBC # BLD AUTO: 7 K/UL (ref 4.6–13.2)

## 2019-09-23 PROCEDURE — 36415 COLL VENOUS BLD VENIPUNCTURE: CPT

## 2019-09-23 PROCEDURE — 85610 PROTHROMBIN TIME: CPT

## 2019-09-23 PROCEDURE — 80053 COMPREHEN METABOLIC PANEL: CPT

## 2019-09-23 PROCEDURE — 71046 X-RAY EXAM CHEST 2 VIEWS: CPT

## 2019-09-23 PROCEDURE — 85025 COMPLETE CBC W/AUTO DIFF WBC: CPT

## 2019-09-27 NOTE — H&P
H&P Note Date of  Admission: 9/30/19 Primary Care Physician:  Raisa Valdez MD 
 
 Assessment:  
 
-Symptomatic irreversible bradycardia with need for long term AV blockers 
-Paroxysmal non-sustained SVT by recent event montor 
-Previous syncope and recurrent dizziness 
-HLD 
-Echo 9/2019 with normal EF Primary cardiologist Dr. Jo Bowles Plan:  
 
 
Plan dual chamber pacemaker implantation. Pertinent risks, benefits, alternatives discussed. Plan moderate sedation if anesthesiologist is not available. Risks include emergent intubation as well as possibly inability to intubate. There can be exacerbation of lung and heart disease including but not limited to heart failure and COPD/asthma. Risks include but are not limited to acute and chronic pain, infection, bleeding, deep venous thrombosis with chronic swelling of extremity, pulmonary embolism, anesthesia reactions, pneumothorax, hemothorax, emergent open heart surgery, need for repeat surgery to replace/reposition leads, and death. There can be a prolonged hospitalization with brain damage and reduced or compromised long term mobility. By stating these are possible risks, this does not exclude the potential for additional risks not named here. All questions answered. If not MRI compatible device, I discussed inability to have future MRI scans but CT scans are acceptable. History of Present Illness: This is a 82 y.o. female admitted for Conduction disorder, unspecified [I45.9]. Patient complains of:   
Referred by Dr. Jo Bowles for possible pacemaker. Patient has remote syncope with recurrent dizziness and sick sinus syndrome by recent event monitor. Review of Symptoms:  Except as stated above include: 
Constitutional:  Dizzy, syncope Ears, nose, throat:  Negative Respiratory:  negative Cardiovascular:  negative Gastrointestinal: negative Genitourinary:  negative Musculoskeletal:  Negative Neurological:  Negative Dermatological:  Negative Hematological: Negative Endocrinological: Negative Allergy: Negative Psychological:  Negative Past Medical History: No past medical history on file. Social History:  
 
Social History Socioeconomic History  Marital status:  Spouse name: Not on file  Number of children: Not on file  Years of education: Not on file  Highest education level: Not on file Tobacco Use  Smoking status: Never Smoker  Smokeless tobacco: Never Used Family History: No family history on file. Medications: Allergies Allergen Reactions  Sulfur Other (comments)  
  reflux No current facility-administered medications for this encounter. Current Outpatient Medications Medication Sig  
 rosuvastatin (CRESTOR) 10 mg tablet  LEVOXYL 137 mcg tablet  RHOPRESSA 0.02 % drop Physical Exam:  
There were no vitals taken for this visit. BP Readings from Last 3 Encounters:  
09/17/19 (!) 126/92  
09/04/19 140/88  
07/10/19 140/88 Pulse Readings from Last 3 Encounters:  
09/17/19 78  
07/10/19 82 Wt Readings from Last 3 Encounters:  
09/17/19 67.1 kg (148 lb) 09/04/19 68.5 kg (151 lb)  
07/10/19 68.5 kg (151 lb) General:  alert, cooperative, no distress, appears stated age Neck:  nontender Lungs:  clear to auscultation bilaterally Heart:  regular rate and rhythm, S1, S2 normal, no murmur, click, rub or gallop Abdomen:  abdomen is soft without significant tenderness, masses, organomegaly or guarding Extremities:  extremities normal, atraumatic, no cyanosis or edema Skin: Warm and dry. no hyperpigmentation, vitiligo, or suspicious lesions Neuro: alert, oriented x3, affect appropriate, no focal neurological deficits, moves all extremities well, no involuntary movements, reflexes at knee and ankle intact Psych: non focal 
 
 Data Review: No results for input(s): WBC, HGB, HCT, PLT, HGBEXT, HCTEXT, PLTEXT in the last 72 hours. No results for input(s): NA, K, CL, CO2, GLU, BUN, CREA, CA, MG, PHOS, ALB, TBIL, SGOT, ALT, INR, INREXT in the last 72 hours. No lab exists for component:  BNP Results for orders placed or performed in visit on 09/17/19 AMB POC EKG ROUTINE W/ 12 LEADS, INTER & REP Narrative EKG: normal EKG, normal sinus rhythm, unchanged from previous tracings, nonspecific ST and T waves changes, left axis deviation. All Cardiac Markers in the last 24 hours:  No results found for: CPK, CK, CKMMB, CKMB, RCK3, CKMBT, CKNDX, CKND1, MCKAY, TROPT, TROIQ, BRIANA, TROPT, TNIPOC, BNP, BNPP Last Lipid:   
Lab Results Component Value Date/Time HDL Cholesterol 43 04/14/2010 09:05 AM  
 
 
Signed By: Kosta Montgomery MD   
 September 27, 2019

## 2019-10-01 ENCOUNTER — ANESTHESIA EVENT (OUTPATIENT)
Dept: CARDIAC CATH/INVASIVE PROCEDURES | Age: 84
End: 2019-10-01
Payer: MEDICARE

## 2019-10-01 ENCOUNTER — APPOINTMENT (OUTPATIENT)
Dept: GENERAL RADIOLOGY | Age: 84
End: 2019-10-01
Attending: INTERNAL MEDICINE
Payer: MEDICARE

## 2019-10-01 ENCOUNTER — HOSPITAL ENCOUNTER (OUTPATIENT)
Age: 84
Setting detail: OBSERVATION
Discharge: HOME | End: 2019-10-02
Attending: INTERNAL MEDICINE | Admitting: INTERNAL MEDICINE
Payer: MEDICARE

## 2019-10-01 ENCOUNTER — ANESTHESIA (OUTPATIENT)
Dept: CARDIAC CATH/INVASIVE PROCEDURES | Age: 84
End: 2019-10-01
Payer: MEDICARE

## 2019-10-01 DIAGNOSIS — Z95.0 PACEMAKER: Primary | ICD-10-CM

## 2019-10-01 DIAGNOSIS — I45.9 CONDUCTION DISORDER, UNSPECIFIED: ICD-10-CM

## 2019-10-01 PROBLEM — R55 SYNCOPE: Status: ACTIVE | Noted: 2019-10-01

## 2019-10-01 PROCEDURE — 77030011255 HC DSG AQUACEL AG BMS -A: Performed by: INTERNAL MEDICINE

## 2019-10-01 PROCEDURE — 74011250636 HC RX REV CODE- 250/636: Performed by: INTERNAL MEDICINE

## 2019-10-01 PROCEDURE — 77030019580 HC CBL PACE MEDT -B: Performed by: INTERNAL MEDICINE

## 2019-10-01 PROCEDURE — 99218 HC RM OBSERVATION: CPT

## 2019-10-01 PROCEDURE — 74011250636 HC RX REV CODE- 250/636

## 2019-10-01 PROCEDURE — C1898 LEAD, PMKR, OTHER THAN TRANS: HCPCS | Performed by: INTERNAL MEDICINE

## 2019-10-01 PROCEDURE — 74011000258 HC RX REV CODE- 258

## 2019-10-01 PROCEDURE — 77030031139 HC SUT VCRL2 J&J -A: Performed by: INTERNAL MEDICINE

## 2019-10-01 PROCEDURE — 71045 X-RAY EXAM CHEST 1 VIEW: CPT

## 2019-10-01 PROCEDURE — 33208 INSRT HEART PM ATRIAL & VENT: CPT | Performed by: INTERNAL MEDICINE

## 2019-10-01 PROCEDURE — 77030018729 HC ELECTRD DEFIB PAD CARD -B: Performed by: INTERNAL MEDICINE

## 2019-10-01 PROCEDURE — 77030018673: Performed by: INTERNAL MEDICINE

## 2019-10-01 PROCEDURE — 77030002933 HC SUT MCRYL J&J -A: Performed by: INTERNAL MEDICINE

## 2019-10-01 PROCEDURE — 74011636320 HC RX REV CODE- 636/320: Performed by: INTERNAL MEDICINE

## 2019-10-01 PROCEDURE — C1785 PMKR, DUAL, RATE-RESP: HCPCS | Performed by: INTERNAL MEDICINE

## 2019-10-01 PROCEDURE — C1893 INTRO/SHEATH, FIXED,NON-PEEL: HCPCS | Performed by: INTERNAL MEDICINE

## 2019-10-01 PROCEDURE — 74011000250 HC RX REV CODE- 250: Performed by: INTERNAL MEDICINE

## 2019-10-01 PROCEDURE — 77030002996 HC SUT SLK J&J -A: Performed by: INTERNAL MEDICINE

## 2019-10-01 PROCEDURE — 76060000033 HC ANESTHESIA 1 TO 1.5 HR: Performed by: INTERNAL MEDICINE

## 2019-10-01 DEVICE — IPG W1DR01 AZURE XT DR MRI WL USA
Type: IMPLANTABLE DEVICE | Status: FUNCTIONAL
Brand: AZURE™ XT DR MRI SURESCAN™

## 2019-10-01 DEVICE — LEAD 407652 CAPSUREFIX NOVUS US MRI
Type: IMPLANTABLE DEVICE | Status: FUNCTIONAL
Brand: CAPSUREFIX NOVUS MRI™ SURESCAN™

## 2019-10-01 DEVICE — LEAD 407658 CAPSUREFIX NOVUS US MRI
Type: IMPLANTABLE DEVICE | Status: FUNCTIONAL
Brand: CAPSUREFIX NOVUS MRI™ SURESCAN™

## 2019-10-01 RX ORDER — ROSUVASTATIN CALCIUM 10 MG/1
10 TABLET, COATED ORAL DAILY
Status: DISCONTINUED | OUTPATIENT
Start: 2019-10-02 | End: 2019-10-02 | Stop reason: HOSPADM

## 2019-10-01 RX ORDER — CEFAZOLIN SODIUM 2 G/50ML
2 SOLUTION INTRAVENOUS
Status: COMPLETED | OUTPATIENT
Start: 2019-10-01 | End: 2019-10-01

## 2019-10-01 RX ORDER — SODIUM CHLORIDE 9 MG/ML
INJECTION, SOLUTION INTRAVENOUS
Status: DISCONTINUED | OUTPATIENT
Start: 2019-10-01 | End: 2019-10-01 | Stop reason: HOSPADM

## 2019-10-01 RX ORDER — METOPROLOL SUCCINATE 25 MG/1
25 TABLET, EXTENDED RELEASE ORAL DAILY
Qty: 30 TAB | Refills: 1 | Status: SHIPPED | OUTPATIENT
Start: 2019-10-02 | End: 2019-10-02

## 2019-10-01 RX ORDER — OXYCODONE AND ACETAMINOPHEN 5; 325 MG/1; MG/1
1 TABLET ORAL
Status: DISCONTINUED | OUTPATIENT
Start: 2019-10-01 | End: 2019-10-02 | Stop reason: HOSPADM

## 2019-10-01 RX ORDER — LEVOTHYROXINE SODIUM 125 UG/1
125 TABLET ORAL
Status: DISCONTINUED | OUTPATIENT
Start: 2019-10-02 | End: 2019-10-02 | Stop reason: HOSPADM

## 2019-10-01 RX ORDER — LIDOCAINE HYDROCHLORIDE 10 MG/ML
INJECTION, SOLUTION EPIDURAL; INFILTRATION; INTRACAUDAL; PERINEURAL AS NEEDED
Status: DISCONTINUED | OUTPATIENT
Start: 2019-10-01 | End: 2019-10-01 | Stop reason: HOSPADM

## 2019-10-01 RX ORDER — PROPOFOL 10 MG/ML
INJECTION, EMULSION INTRAVENOUS
Status: DISCONTINUED | OUTPATIENT
Start: 2019-10-01 | End: 2019-10-01 | Stop reason: HOSPADM

## 2019-10-01 RX ORDER — FENTANYL CITRATE 50 UG/ML
INJECTION, SOLUTION INTRAMUSCULAR; INTRAVENOUS AS NEEDED
Status: DISCONTINUED | OUTPATIENT
Start: 2019-10-01 | End: 2019-10-01 | Stop reason: HOSPADM

## 2019-10-01 RX ORDER — CEFAZOLIN SODIUM 2 G/50ML
2 SOLUTION INTRAVENOUS EVERY 8 HOURS
Status: COMPLETED | OUTPATIENT
Start: 2019-10-01 | End: 2019-10-02

## 2019-10-01 RX ORDER — METOPROLOL SUCCINATE 25 MG/1
25 TABLET, EXTENDED RELEASE ORAL DAILY
Status: DISCONTINUED | OUTPATIENT
Start: 2019-10-02 | End: 2019-10-02 | Stop reason: HOSPADM

## 2019-10-01 RX ADMIN — FENTANYL CITRATE 25 MCG: 50 INJECTION, SOLUTION INTRAMUSCULAR; INTRAVENOUS at 11:32

## 2019-10-01 RX ADMIN — FENTANYL CITRATE 25 MCG: 50 INJECTION, SOLUTION INTRAMUSCULAR; INTRAVENOUS at 11:19

## 2019-10-01 RX ADMIN — FENTANYL CITRATE 25 MCG: 50 INJECTION, SOLUTION INTRAMUSCULAR; INTRAVENOUS at 11:00

## 2019-10-01 RX ADMIN — PROPOFOL 50 MCG/KG/MIN: 10 INJECTION, EMULSION INTRAVENOUS at 11:15

## 2019-10-01 RX ADMIN — CEFAZOLIN 2 G: 10 INJECTION, POWDER, FOR SOLUTION INTRAVENOUS at 11:01

## 2019-10-01 RX ADMIN — CEFAZOLIN 2 G: 10 INJECTION, POWDER, FOR SOLUTION INTRAVENOUS at 20:21

## 2019-10-01 RX ADMIN — FENTANYL CITRATE 25 MCG: 50 INJECTION, SOLUTION INTRAMUSCULAR; INTRAVENOUS at 11:15

## 2019-10-01 RX ADMIN — SODIUM CHLORIDE: 9 INJECTION, SOLUTION INTRAVENOUS at 10:53

## 2019-10-01 NOTE — Clinical Note
A Bovie was used. Blend setting: blend. Mode: monopolar. Coagulation Settin. Cut Settin. Site (pad location): upper leg. Laterality: right.

## 2019-10-01 NOTE — PROGRESS NOTES
TRANSFER - OUT REPORT:    Verbal report given to Cornell Domingo RN (name) on [facility]  being transferred to [facility](unit) for routine progression of care       Report consisted of patients Situation, Background, Assessment and   Recommendations(SBAR). Information from the following report(s) SBAR, Kardex, Procedure Summary and MAR was reviewed with the receiving nurse. Lines:       Opportunity for questions and clarification was provided.       Patient transported with:   Registered Nurse     Dressing site checked with Cornell Domingo RN  Site dry and intact, No bleeding hematoma noted

## 2019-10-01 NOTE — Clinical Note
TRANSFER - IN REPORT:  
 
Verbal report received from: Radha Mathew RN. Report consisted of patient's Situation, Background, Assessment and  
Recommendations(SBAR). Opportunity for questions and clarification was provided. Assessment completed upon patient's arrival to unit and care assumed. Patient transported with a Cardiac Cath Tech / Patient Care Tech.

## 2019-10-01 NOTE — PROGRESS NOTES
Bedside and Verbal shift change report given to POLI Acharya (oncoming nurse) by Anisa Nation RN (offgoing nurse). Report included the following information SBAR, Kardex, Procedure Summary and MAR.

## 2019-10-01 NOTE — Clinical Note
TRANSFER - OUT REPORT:  
 
Verbal report given to: Jayda Moreno RN. Report consisted of patient's Situation, Background, Assessment and  
Recommendations(SBAR). Opportunity for questions and clarification was provided. Patient transported with a Cardiac Cath Tech / Patient Care Tech. Patient transported to: [address]

## 2019-10-01 NOTE — ROUTINE PROCESS
Assumed patient care. Bedside shift erport received from Manuel Talbot. Assisted to the bathroom and back to bed. Pacemaker site to left chest clean and intact, no swelling, no bleeding noted. Patient denies pain at this time. Sling to left arm in place. Call light placed within reach. Bed in low position and personal items placed in reach.  
 
7:26 AM - Bedside shift change report given to Manuel Talbot (oncoming nurse) by Devin Zazueta RN (offgoing nurse). Report given with SBAR, Kardex, Intake/Output, MAR and Recent Results.

## 2019-10-01 NOTE — PROGRESS NOTES
1730 pt arrived to unit, ambulated to bathroom with standby assistance. Skin intact, verified by Abilio Bowling RN. Pt placed on cardiac monitor, NSR HR in the 70's. No complaints of pain at this time. Will continue to monitor.

## 2019-10-01 NOTE — H&P
H&P update and Consult Note    Date of  Admission: 10/1/19   Primary Care Physician:  Sarah Low MD     Assessment:     -Symptomatic irreversible bradycardia with need for long term AV blockers  -Paroxysmal non-sustained SVT by recent event montor  -Previous syncope and recurrent dizziness  -HLD  -Echo 9/2019 with normal EF    Primary cardiologist Dr. Edouard Haskins:       Plan dual chamber pacemaker implantation. Discussed with patient, all questions answered. Pertinent risks, benefits, alternatives discussed. Plan moderate sedation if anesthesiologist is not available. Risks include emergent intubation as well as possibly inability to intubate. There can be exacerbation of lung and heart disease including but not limited to heart failure and COPD/asthma. Risks include but are not limited to acute and chronic pain, infection, bleeding, deep venous thrombosis with chronic swelling of extremity, pulmonary embolism, anesthesia reactions, pneumothorax, hemothorax, emergent open heart surgery, need for repeat surgery to replace/reposition leads, and death. There can be a prolonged hospitalization with brain damage and reduced or compromised long term mobility. By stating these are possible risks, this does not exclude the potential for additional risks not named here. All questions answered. If not MRI compatible device, I discussed inability to have future MRI scans but CT scans are acceptable. History of Present Illness: This is a 82 y.o. female admitted for Conduction disorder, unspecified [I45.9]. Patient complains of:    Referred by Dr. Lili Mendoza for possible pacemaker. Patient has remote syncope with recurrent dizziness and sick sinus syndrome by recent event monitor.     Review of Symptoms:  Except as stated above include:  Constitutional:  Dizzy, syncope  Ears, nose, throat:  Negative  Respiratory:  negative  Cardiovascular:  negative  Gastrointestinal: negative  Genitourinary: negative  Musculoskeletal:  Negative  Neurological:  Negative  Dermatological:  Negative  Hematological: Negative  Endocrinological: Negative  Allergy: Negative  Psychological:  Negative       Past Medical History:   No past medical history on file. Social History:     Social History     Socioeconomic History    Marital status:      Spouse name: Not on file    Number of children: Not on file    Years of education: Not on file    Highest education level: Not on file   Tobacco Use    Smoking status: Never Smoker    Smokeless tobacco: Never Used        Family History:   No family history on file. Medications: Allergies   Allergen Reactions    Sulfur Other (comments)     reflux        No current facility-administered medications for this encounter. Current Outpatient Medications   Medication Sig    rosuvastatin (CRESTOR) 10 mg tablet     LEVOXYL 137 mcg tablet     RHOPRESSA 0.02 % drop          Physical Exam:   There were no vitals taken for this visit. BP Readings from Last 3 Encounters:   09/17/19 (!) 126/92   09/04/19 140/88   07/10/19 140/88     Pulse Readings from Last 3 Encounters:   09/17/19 78   07/10/19 82     Wt Readings from Last 3 Encounters:   09/17/19 67.1 kg (148 lb)   09/04/19 68.5 kg (151 lb)   07/10/19 68.5 kg (151 lb)       General:  alert, cooperative, no distress, appears stated age  Neck:  nontender  Lungs:  clear to auscultation bilaterally  Heart:  regular rate and rhythm, S1, S2 normal, no murmur, click, rub or gallop  Abdomen:  abdomen is soft without significant tenderness, masses, organomegaly or guarding  Extremities:  extremities normal, atraumatic, no cyanosis or edema  Skin: Warm and dry.  no hyperpigmentation, vitiligo, or suspicious lesions  Neuro: alert, oriented x3, affect appropriate, no focal neurological deficits, moves all extremities well, no involuntary movements, reflexes at knee and ankle intact  Psych: non focal     Data Review:     No results for input(s): WBC, HGB, HCT, PLT, HGBEXT, HCTEXT, PLTEXT in the last 72 hours. No results for input(s): NA, K, CL, CO2, GLU, BUN, CREA, CA, MG, PHOS, ALB, TBIL, SGOT, ALT, INR, INREXT in the last 72 hours. No lab exists for component:  BNP    Results for orders placed or performed in visit on 09/17/19   AMB POC EKG ROUTINE W/ 12 LEADS, INTER & REP    Narrative    EKG: normal EKG, normal sinus rhythm, unchanged from previous tracings, nonspecific ST and T waves changes, left axis deviation.          All Cardiac Markers in the last 24 hours:  No results found for: CPK, CK, CKMMB, CKMB, RCK3, CKMBT, CKNDX, CKND1, MCKAY, TROPT, TROIQ, BRIANA, TROPT, TNIPOC, BNP, BNPP    Last Lipid:    Lab Results   Component Value Date/Time    HDL Cholesterol 43 04/14/2010 09:05 AM

## 2019-10-01 NOTE — DISCHARGE SUMMARY
[facility] - Discharge Summary      Discharge Summary     Patient: Quentin Cantu MRN: [id number]  SSN: [id number]    YOB: 1935  Age: 82 y.o. Sex: female       Admit Date: 10/1/2019    Discharge Date: 10/2/2019      Admission Diagnoses: Conduction disorder, unspecified [I45.9]  Pacemaker [Z95.0]  Pacemaker [Z95.0]    Discharge Diagnoses:   Problem List as of 10/1/2019 Never Reviewed          Codes Class Noted - Resolved    * (Principal) Pacemaker ICD-10-CM: Z95.0  ICD-9-CM: V45.01  10/1/2019 - Present    Overview Signed 10/1/2019 10:58 AM by Nish Jay MD     Medtronic pacemaker 10/1/19                    Discharge Condition: Stable    Hospital Course: Quentin Cantu is a 82 y.o. female who presented to the hospital for PPM placement due to symptomatic irreversible bradycardia. She is now s/p successful implantation of dual-chamber Medtronic pacemaker. She is being monitored overnight with plan for discharge in the morning. ** Doing well overnight. Pain controlled. PPM site dressing C/D/I. PPM interrogated and working appropriately. Will plan to discharge home after CXR done and reviewed. Consults: None    Significant Diagnostic Studies:   CXR 10/1/2019: Left subclavian dual-lead pacing device in place, no pneumothorax    Disposition: home    Discharge Medications:      My Medications      START taking these medications      Instructions Each Dose to Equal Morning Noon Evening Bedtime   metoprolol succinate 25 mg XL tablet  Commonly known as:  TOPROL-XL  Start taking on:  10/2/2019    Your last dose was: Your next dose is: Take 1 Tab by mouth daily. 25 mg                    CONTINUE taking these medications      Instructions Each Dose to Equal Morning Noon Evening Bedtime   LEVOXYL 137 mcg tablet  Generic drug:  levothyroxine    Your last dose was:       Your next dose is:                          rosuvastatin 10 mg tablet  Commonly known as: CRESTOR    Your last dose was: Your next dose is:                             ASK your doctor about these medications      Instructions Each Dose to Equal Morning Noon Evening Bedtime   RHOPRESSA 0.02 % Drop  Generic drug:  netarsudil    Your last dose was: Your next dose is:                                Where to Get Your Medications      These medications were sent to [facility] [address] [facility], [address] [place] [address] [address] [facility] [address]  [address] [facility], [address] [place] [address]    Phone:  [phone]   · metoprolol succinate 25 mg XL tablet         Activity:   For affected arm, no lifting more than 10 pounds or lifting elbow above shoulder for 4 weeks. No driving ideally for 2 weeks due to concern for airbag. Diet: Cardiac Diet  Wound Care: You can remove the dressing after 1 week. You should \"pat\" dry over the incision after showering once the dressing is removed. No hot tubs or pools for 2 weeks. Follow-up Appointments   Procedures    FOLLOW UP VISIT Appointment in: Other (Specify) Follow-up in Dr. Crystal Tadeo office in 7-10 days for wound/device check. Follow-up with Dr. Robert Zhong in 4-6 weeks. Follow-up in Dr. Crystal Tadeo office in 7-10 days for wound/device check. Follow-up with Dr. Robert Zhong in 4-6 weeks. Standing Status:   Standing     Number of Occurrences:   1     Order Specific Question:   Appointment in     Answer:    Other (Specify)       Signed By: Joy Mitchell PA-C     October 1, 2019

## 2019-10-01 NOTE — PROGRESS NOTES
Cath holding summary    Patient escorted to cath holding from waiting area ambulatory, alert and oriented x 4, voicing no complaints. Changed into gown and placed on monitor. NPO since MN. Lab results, med rec and H&P reviewed on chart. PIV x 2 inserted without difficulty. Family to bedside. 1158 patient arrived to cath holding awake and alert, vital signs stable, left upper chest clean dry and intact with no hematoma present, no C? O pain will continue to monitor.

## 2019-10-01 NOTE — ANESTHESIA PREPROCEDURE EVALUATION
Relevant Problems   No relevant active problems       Anesthetic History     PONV          Review of Systems / Medical History  Patient summary reviewed, nursing notes reviewed and pertinent labs reviewed    Pulmonary  Within defined limits                 Neuro/Psych             Comments: syncope Cardiovascular            Dysrhythmias : SVT  Hyperlipidemia      Comments: 09/2019 ECHO  Estimated left ventricular ejection fraction is 56 - 60%   GI/Hepatic/Renal  Within defined limits              Endo/Other  Within defined limits           Other Findings            Physical Exam    Airway  Mallampati: II  TM Distance: 4 - 6 cm  Neck ROM: normal range of motion   Mouth opening: Normal     Cardiovascular    Rhythm: regular           Dental  No notable dental hx       Pulmonary                 Abdominal         Other Findings            Anesthetic Plan    ASA: 3  Anesthesia type: MAC            Anesthetic plan and risks discussed with: Patient

## 2019-10-01 NOTE — Clinical Note
Contrast Dose Calculator:  
Patient's age: 82.  
Patient's sex: Female. Patient weight (kg) = 67. Creatinine level (mg/dL) = 0.82. Creatinine clearance (mL/min): 57.  
Contrast concentration (mg/mL) = 300. MACD = 300 mL. Max Contrast dose per Creatinine Cl calculator = 128.25 mL.

## 2019-10-02 ENCOUNTER — APPOINTMENT (OUTPATIENT)
Dept: GENERAL RADIOLOGY | Age: 84
End: 2019-10-02
Attending: INTERNAL MEDICINE
Payer: MEDICARE

## 2019-10-02 VITALS
HEART RATE: 72 BPM | OXYGEN SATURATION: 94 % | HEIGHT: 65 IN | BODY MASS INDEX: 24.66 KG/M2 | TEMPERATURE: 98.1 F | DIASTOLIC BLOOD PRESSURE: 81 MMHG | RESPIRATION RATE: 20 BRPM | WEIGHT: 148 LBS | SYSTOLIC BLOOD PRESSURE: 135 MMHG

## 2019-10-02 PROCEDURE — 99218 HC RM OBSERVATION: CPT

## 2019-10-02 PROCEDURE — 74011250637 HC RX REV CODE- 250/637: Performed by: INTERNAL MEDICINE

## 2019-10-02 PROCEDURE — 90471 IMMUNIZATION ADMIN: CPT

## 2019-10-02 PROCEDURE — 71046 X-RAY EXAM CHEST 2 VIEWS: CPT

## 2019-10-02 PROCEDURE — 90686 IIV4 VACC NO PRSV 0.5 ML IM: CPT | Performed by: INTERNAL MEDICINE

## 2019-10-02 PROCEDURE — 74011250636 HC RX REV CODE- 250/636: Performed by: INTERNAL MEDICINE

## 2019-10-02 RX ORDER — METOPROLOL SUCCINATE 25 MG/1
25 TABLET, EXTENDED RELEASE ORAL DAILY
Qty: 30 TAB | Refills: 1 | Status: SHIPPED | OUTPATIENT
Start: 2019-10-02 | End: 2020-01-07 | Stop reason: SDUPTHER

## 2019-10-02 RX ORDER — OXYCODONE AND ACETAMINOPHEN 5; 325 MG/1; MG/1
1 TABLET ORAL
Qty: 15 TAB | Refills: 0 | Status: SHIPPED | OUTPATIENT
Start: 2019-10-02 | End: 2019-10-05

## 2019-10-02 RX ADMIN — CEFAZOLIN 2 G: 10 INJECTION, POWDER, FOR SOLUTION INTRAVENOUS at 02:15

## 2019-10-02 RX ADMIN — INFLUENZA VIRUS VACCINE 0.5 ML: 15; 15; 15; 15 SUSPENSION INTRAMUSCULAR at 12:06

## 2019-10-02 RX ADMIN — LEVOTHYROXINE SODIUM 125 MCG: 125 TABLET ORAL at 06:36

## 2019-10-02 RX ADMIN — METOPROLOL SUCCINATE 25 MG: 25 TABLET, EXTENDED RELEASE ORAL at 09:04

## 2019-10-02 RX ADMIN — ROSUVASTATIN CALCIUM 10 MG: 10 TABLET, FILM COATED ORAL at 09:04

## 2019-10-02 NOTE — PROGRESS NOTES
D/C order noted for today. Orders reviewed. No needs identified at this time. CM remains available if needed.  Melinda Weir RN [phone]

## 2019-10-02 NOTE — DISCHARGE INSTRUCTIONS
Patient Education        Pacemaker Placement for Heart Failure: What to Expect at [address] pacemaker for heart failure is a biventricular pacemaker (say \"nancy-yovany-TRICK-sis-diane\"). Treatment that uses this type of pacemaker is called cardiac resynchronization therapy (CRT). When you have heart failure, the lower chambers of your heart may not pump at the same time. The pacemaker sends painless electrical signals to your heart. These signals make the chambers pump at the same time. This can help your heart pump blood better and help you feel better. Your pacemaker may be combined with an ICD, or implantable cardioverter-defibrillator. It can control abnormal heart rhythms. This can prevent sudden death. Your chest may be sore where the doctor made the cut (incision) and put in the pacemaker. You also may have a bruise and mild swelling. These symptoms usually get better in 1 to 2 weeks. You may feel a hard ridge along the incision. This usually gets softer in the months after surgery. You may be able to see or feel the outline of the pacemaker under your skin. You will probably be able to go back to work or your usual routine 1 to 2 weeks after surgery. Pacemaker batteries usually last 5 to 15 years. Your doctor will talk to you about how often you will need to have your pacemaker checked. You'll need to take steps to safely use electric devices. Some of these devices can stop your pacemaker from working right for a short time. Check with your doctor about what to avoid and what to keep a short distance away from your pacemaker. For example, you will need to stay away from things with strong magnetic and electrical fields. An example is an MRI machine (unless your pacemaker is safe for an MRI). You can use a cell phone and other wireless devices but keep them at least 6 inches away from your pacemaker. Many household and office electronics do not affect a pacemaker.  These include kitchen appliances and computers. This care sheet gives you a general idea about how long it will take for you to recover. But each person recovers at a different pace. Follow the steps below to get better as quickly as possible. How can you care for yourself at home? Activity    · Rest when you feel tired.     · Be active. Walking is a good choice.     · For 4 to 6 weeks:  ? Avoid activities that strain your chest or upper arm muscles. This includes pushing a  or vacuum, mopping floors, swimming, or swinging a golf club or tennis racquet. ? Do not raise your arm (the one on the side of your body where the pacemaker is located) above your shoulder. ? Allow your body to heal. Don't move quickly or lift anything heavy until you are feeling better.     · Many people are able to return to work within 1 to 2 weeks after surgery.     · Ask your doctor when it is okay for you to have sex. Diet    · You can eat your normal diet. If your stomach is upset, try bland, low-fat foods like plain rice, broiled chicken, toast, and yogurt. Medicines    · Your doctor will tell you if and when you can restart your medicines. He or she will also give you instructions about taking any new medicines.     · If you take aspirin or some other blood thinner, be sure to talk to your doctor. He or she will tell you if and when to start taking this medicine again. Make sure that you understand exactly what your doctor wants you to do.     · Be safe with medicines. Read and follow all instructions on the label. ? If the doctor gave you a prescription medicine for pain, take it as prescribed. ? If you are not taking a prescription pain medicine, ask your doctor if you can take an over-the-counter medicine. ? Do not take aspirin, ibuprofen (Advil, Motrin), naproxen (Aleve), or other nonsteroidal anti-inflammatory drugs (NSAIDs) unless your doctor says it is okay.     · If your doctor prescribed antibiotics, take them as directed.  Do not stop taking them just because you feel better. You need to take the full course of antibiotics. Incision care    · If you have strips of tape on the incision, leave the tape on for a week or until it falls off.     · Keep the incision dry while it heals. Your doctor may recommend sponge baths for about 7 days, but do not get the incision wet. Your doctor will let you know when you may take showers. After a shower, pat the incision dry.     · Don't use hydrogen peroxide or alcohol on the incision, which can slow healing. You may cover the area with a gauze bandage if it oozes fluid or rubs against clothing. Change the bandage every day.     · Do not take a bath or get into a hot tub for the first 2 weeks, or until your doctor tells you it is okay. Other instructions    · Keep a medical ID card with you at all times that says you have a pacemaker. The card should include the  and model information.     · Wear medical alert jewelry stating that you have a pacemaker. You can buy this at most [employer].     · Check your pulse as directed by your doctor.     · Have your pacemaker checked as often as your doctor recommends. In some cases, this may be done over the phone or the Internet. Your doctor will give you instructions about how to do this. Follow-up care is a key part of your treatment and safety. Be sure to make and go to all appointments, and call your doctor if you are having problems. It's also a good idea to know your test results and keep a list of the medicines you take. When should you call for help? Call 911 anytime you think you may need emergency care.  For example, call if:    · You passed out (lost consciousness).     · You have trouble breathing.    Call your doctor now or seek immediate medical care if:    · You are dizzy or light-headed, or you feel like you may faint.     · You have pain that does not get better after you take pain medicine.     · You hear an alarm or feel a vibration from your pacemaker.     · You have loose stitches, or your incision comes open.     · Bright red blood has soaked through the bandage over your incision.     · You have signs of infection, such as:  ? Increased pain, swelling, warmth, or redness. ? Red streaks leading from the incision. ? Pus draining from the incision. ? A fever.    Watch closely for changes in your health, and be sure to contact your doctor if:    · You have any problems with your pacemaker. Where can you learn more? Go to http://anne-korey.info/. Enter T984 in the search box to learn more about \"Pacemaker Placement for Heart Failure: What to Expect at Home. \"  Current as of: April 9, 2019  Content Version: 12.2  © 2752-6766 [employer]. Care instructions adapted under license by [employer] (which disclaims liability or warranty for this information). If you have questions about a medical condition or this instruction, always ask your healthcare professional. [place] [address] any warranty or liability for your use of this information.

## 2019-10-02 NOTE — PROGRESS NOTES
Problem: Falls - Risk of  Goal: *Absence of Falls  Description  Document Breonna Singh Fall Risk and appropriate interventions in the flowsheet.   Outcome: Progressing Towards Goal  Note:   Fall Risk Interventions:            Medication Interventions: Evaluate medications/consider consulting pharmacy, Patient to call before getting OOB, Teach patient to arise slowly    Elimination Interventions: Patient to call for help with toileting needs, Call light in reach              Problem: Syncope  Goal: *Absence of injury  Outcome: Progressing Towards Goal     Problem: Pacer/ICD: Post-Procedure  Goal: Activity/Safety  Outcome: Progressing Towards Goal     Problem: Pacer/ICD: Post-Procedure  Goal: Off Pathway (Use only if patient is Off Pathway)  Outcome: Progressing Towards Goal

## 2019-10-02 NOTE — PROGRESS NOTES
0700: Received report from [facility]. Included SBAR, I/O, meds, plans for discharge. 1957: Administered scheduled meds.

## 2019-10-02 NOTE — PROGRESS NOTES
Observation notice provided in writing to patient and/or caregiver as well as verbal explanation of the policy. Patients who are in outpatient status also receive the Observation notice.     Victorina Briggs RN BSN  Case Management [phone]

## 2019-10-02 NOTE — PROGRESS NOTES
Reason for Admission:  Conduction disorder, unspecified [I45.9]  Pacemaker [Z95.0]  Pacemaker [Z95.0]  Syncope [R55]                 RRAT Score:    8            Plan for utilizing home health:    none                      Likelihood of Readmission:   LOW                         Transition of Care Plan:              Initial assessment completed with patient. Cognitive status of patient: oriented to time, place, person and situation. Face sheet information confirmed:  yes. The patient designates , Ranny Felty, to participate in her discharge plan and to receive any needed information. This patient lives in a single family home with patient and . Patient is able to navigate steps as needed. Prior to hospitalization, patient was considered to be independent with ADLs/IADLS : yes . Patient states her adult children live close and are able to help in addition to  as she recovers. Patient has a current ACP document on file: no  The patient and  will be available to transport patient home upon discharge. The patient already has none reported, medical equipment available in the home. Patient is not currently active with home health. Patient has not stayed in a skilled nursing facility or rehab. This patient is on dialysis :no    Freedom of choice signed: no. Currently, the discharge plan is Home. The patient states that she can obtain her medications from the pharmacy, and take her medications as directed. Patient's current insurance is Medicare/[employer]       Care Management Interventions  PCP Verified by CM:  Yes  Mode of Transport at Discharge: Self  Current Support Network: Lives with Spouse  Confirm Follow Up Transport: Family  Plan discussed with Pt/Family/Caregiver: Yes  Discharge Location  Discharge Placement: [address] RN BSN  Case Management [phone]

## 2019-10-02 NOTE — ANESTHESIA POSTPROCEDURE EVALUATION
Procedure(s):  INSERT PPM DUAL. MAC    Anesthesia Post Evaluation      Multimodal analgesia: multimodal analgesia used between 6 hours prior to anesthesia start to PACU discharge  Patient location during evaluation: PACU  Patient participation: complete - patient participated  Level of consciousness: awake and alert  Pain management: adequate  Airway patency: patent  Anesthetic complications: no  Cardiovascular status: acceptable and hemodynamically stable  Respiratory status: acceptable and room air  Hydration status: acceptable  Post anesthesia nausea and vomiting:  controlled      No vitals data found for the desired time range.

## 2019-10-02 NOTE — PROGRESS NOTES
Problem: Falls - Risk of  Goal: *Absence of Falls  Description  Document Jordy Silverio Fall Risk and appropriate interventions in the flowsheet.   Outcome: Resolved/Met  Note:   Fall Risk Interventions:            Medication Interventions: Assess postural VS orthostatic hypotension, Bed/chair exit alarm    Elimination Interventions: Patient to call for help with toileting needs, Call light in reach, Bed/chair exit alarm, Elevated toilet seat              Problem: Patient Education: Go to Patient Education Activity  Goal: Patient/Family Education  Outcome: Resolved/Met     Problem: Syncope  Goal: *Absence of injury  Outcome: Resolved/Met  Goal: Decrease or eliminate episodes of syncope  Outcome: Resolved/Met     Problem: Patient Education: Go to Patient Education Activity  Goal: Patient/Family Education  Outcome: Resolved/Met     Problem: Patient Education: Go to Patient Education Activity  Goal: Patient/Family Education  Outcome: Resolved/Met     Problem: Pacer/ICD: Post-Procedure  Goal: Off Pathway (Use only if patient is Off Pathway)  Outcome: Resolved/Met  Goal: Activity/Safety  Outcome: Resolved/Met  Goal: Consults, if ordered  Outcome: Resolved/Met  Goal: Diagnostic Test/Procedures  Outcome: Resolved/Met  Goal: Nutrition/Diet  Outcome: Resolved/Met  Goal: Discharge Planning  Outcome: Resolved/Met  Goal: Medications  Outcome: Resolved/Met  Goal: Respiratory  Outcome: Resolved/Met  Goal: Treatments/Interventions/Procedures  Outcome: Resolved/Met  Goal: Psychosocial  Outcome: Resolved/Met  Goal: *Hemodynamically stable  Outcome: Resolved/Met  Goal: *Optimal pain control at patient's stated goal  Outcome: Resolved/Met  Goal: *Absence of signs and symptoms of infection or wound complication  Outcome: Resolved/Met     Problem: Pacer/ICD: Day 1  Goal: Off Pathway (Use only if patient is Off Pathway)  Outcome: Resolved/Met  Goal: Activity/Safety  Outcome: Resolved/Met  Goal: Diagnostic Test/Procedures  Outcome: Resolved/Met  Goal: Nutrition/Diet  Outcome: Resolved/Met  Goal: Discharge Planning  Outcome: Resolved/Met  Goal: Medications  Outcome: Resolved/Met  Goal: Respiratory  Outcome: Resolved/Met  Goal: Treatments/Interventions/Procedures  Outcome: Resolved/Met  Goal: Psychosocial  Outcome: Resolved/Met

## 2019-10-02 NOTE — PROGRESS NOTES
conducted an initial consultation and Spiritual Assessment for Zachery Hancock, who is a 82 y.o.,female. Patients Primary Language is: [place]. According to the patients EMR Muslim Affiliation is: [facility].     The reason the Patient came to the hospital is:   Patient Active Problem List    Diagnosis Date Noted    Syncope 10/01/2019     Priority: 1 - One    Pacemaker 10/01/2019        The  provided the following Interventions:  Patient and  received my visit with a warm smile and pleasant welcome as I initiated a relationship of care and support. Patient shared that she had a pleasant stay in the hospital but was not able to sleep well but do understand that they need to monitor her overnight. Listened empathically as patient shared that she and her  were  for 58 years with two children. Provided chaplaincy education. Provided information about Spiritual Care Services. Offered  assurance of continued prayers on patient's behalf. Chart reviewed. The following outcomes where achieved:  Patient shared limited information about both her medical narrative and spiritual journey/beliefs.  confirmed Patient's Muslim Affiliation with [facility] tradition. Patient processed feeling about current hospitalization and will be discharge today. Patient expressed gratitude for 's visit. Assessment:  Patient is not in any distress, no emotional concerns or spiritual needs at the time of visit. Patient does not have any Confucianism/cultural needs that will affect patients preferences in health care. There are no spiritual or Confucianism issues which require intervention at this time. Plan:  Chaplains will continue to follow and will provide pastoral care on an as needed/requested basis.  recommends bedside caregivers page  on duty if patient shows signs of acute spiritual or emotional distress.     [address] [facility] [phone]

## 2019-10-10 ENCOUNTER — CLINICAL SUPPORT (OUTPATIENT)
Dept: CARDIOLOGY CLINIC | Age: 84
End: 2019-10-10

## 2019-10-10 DIAGNOSIS — Z95.0 CARDIAC PACEMAKER IN SITU: ICD-10-CM

## 2019-10-10 DIAGNOSIS — I49.5 TACHYCARDIA-BRADYCARDIA SYNDROME (HCC): Primary | ICD-10-CM

## 2019-10-10 NOTE — PROGRESS NOTES
Patient presents for wound check. Dressing removed. Incision site is well approximated. No redness, swelling, or drainage at site. No s/s of infection. No c/o pain or discomfort. Patient re educated not to lift arm over head for 4-6 wks after implant date which she states she has been complying to. She was instructed to call the office for any s/s of infection or fever.

## 2019-10-25 NOTE — PROGRESS NOTES
I have personally seen and evaluated the device findings. Interrogation reviewed and I agree with assessment.     Néstor Barron

## 2020-01-07 DIAGNOSIS — Z95.0 PACEMAKER: ICD-10-CM

## 2020-01-08 RX ORDER — METOPROLOL SUCCINATE 25 MG/1
25 TABLET, EXTENDED RELEASE ORAL DAILY
Qty: 30 TAB | Refills: 6 | Status: SHIPPED | OUTPATIENT
Start: 2020-01-08 | End: 2020-01-21 | Stop reason: ALTCHOICE

## 2020-01-15 ENCOUNTER — OFFICE VISIT (OUTPATIENT)
Dept: CARDIOLOGY CLINIC | Age: 85
End: 2020-01-15

## 2020-01-15 DIAGNOSIS — Z95.0 PACEMAKER: Primary | ICD-10-CM

## 2020-01-15 DIAGNOSIS — I49.5 TACHYCARDIA-BRADYCARDIA SYNDROME (HCC): ICD-10-CM

## 2020-01-21 ENCOUNTER — OFFICE VISIT (OUTPATIENT)
Dept: CARDIOLOGY CLINIC | Age: 85
End: 2020-01-21

## 2020-01-21 VITALS
OXYGEN SATURATION: 97 % | DIASTOLIC BLOOD PRESSURE: 84 MMHG | BODY MASS INDEX: 24.83 KG/M2 | SYSTOLIC BLOOD PRESSURE: 138 MMHG | HEART RATE: 63 BPM | WEIGHT: 149 LBS | HEIGHT: 65 IN

## 2020-01-21 DIAGNOSIS — E78.00 HYPERCHOLESTEREMIA: ICD-10-CM

## 2020-01-21 DIAGNOSIS — R00.2 PALPITATION: ICD-10-CM

## 2020-01-21 DIAGNOSIS — R55 SYNCOPE, UNSPECIFIED SYNCOPE TYPE: ICD-10-CM

## 2020-01-21 DIAGNOSIS — I49.5 TACHYCARDIA-BRADYCARDIA SYNDROME (HCC): ICD-10-CM

## 2020-01-21 DIAGNOSIS — R42 DIZZINESS: ICD-10-CM

## 2020-01-21 DIAGNOSIS — I47.1 PSVT (PAROXYSMAL SUPRAVENTRICULAR TACHYCARDIA) (HCC): Primary | ICD-10-CM

## 2020-01-21 RX ORDER — METOPROLOL TARTRATE 50 MG/1
50 TABLET ORAL 2 TIMES DAILY
Qty: 60 TAB | Refills: 1 | Status: SHIPPED | OUTPATIENT
Start: 2020-01-21 | End: 2020-05-22

## 2020-01-21 NOTE — PROGRESS NOTES
HISTORY OF PRESENT ILLNESS  Alex Butler is a 82 y.o. female. HPI  She has been feeling quite well. In fact, she feels better since she had pacemaker implantation in October 2019. She has had no chest pain, dyspnea, orthopnea, PND. She denies palpitations, dizziness or syncope. She underwent pacemaker implantation on 10/1/19. She received the Medtronic MRI compatible pacemaker. She was found to have bradytachy arrhythmia on the 30-day event recorder. She had frequent episodes of nonsustained supraventricular tachycardia up to 169 bpm along with significant sinus bradycardia. Interrogation of the pacemaker demonstrated atrial pacing and recurrent episodes of frequent supraventricular tachycardia. She underwent echocardiogram on 9/4/19 which demonstrated normal left ventricular systolic function with EF in the 55-60% range. There was no significant valvular pathology and no evidence of mitral valve prolapse. There was no significant pulmonary hypertension.      She underwent a 30-day event recorder which demonstrated frequent episodes of nonsustained supraventricular tachycardia up to 169 bpm. She also has had sinus bradycardia in the low to mid 40's mostly during the night in sleep but also occurring in the daytime as well. There were no significant pauses or sustained ventricular tachycardia.      She has had occasional episodes of syncope for a number of years which has been more frequent in the recent six months or so. She is a nonsmoker. She has no history of hypertension or diabetes mellitus. She is not on any medications for blood pressure or sugar. She denies a family history of coronary artery disease.     Review of Systems   Constitutional: Negative for malaise/fatigue and weight loss. HENT: Negative for hearing loss. Eyes: Negative for blurred vision and double vision. Respiratory: Negative for shortness of breath.     Cardiovascular: Negative for chest pain, palpitations, orthopnea, claudication, leg swelling and PND. Gastrointestinal: Negative for blood in stool, heartburn and melena. Genitourinary: Negative for dysuria, frequency, hematuria and urgency. Musculoskeletal: Negative for back pain and joint pain. Skin: Negative for itching and rash. Neurological: Negative for dizziness and loss of consciousness. Psychiatric/Behavioral: Negative for depression and memory loss. Physical Exam   Constitutional: She is oriented to person, place, and time. She appears well-developed and well-nourished. HENT:   Head: Normocephalic and atraumatic. Eyes: Pupils are equal, round, and reactive to light. Conjunctivae are normal.   Neck: Normal range of motion. Neck supple. No JVD present. Cardiovascular: Normal rate, regular rhythm, S1 normal and S2 normal.  No extrasystoles are present. PMI is not displaced. Exam reveals no gallop and no friction rub. No murmur heard. Pulses:       Carotid pulses are 3+ on the right side and 3+ on the left side. Pulmonary/Chest: Effort normal. She has no rales. Abdominal: Soft. There is no tenderness. Musculoskeletal:         General: No edema. Neurological: She is alert and oriented to person, place, and time. No cranial nerve deficit. Skin: Skin is warm and dry. Psychiatric: She has a normal mood and affect. Her behavior is normal.     Visit Vitals  /84   Pulse 63   Ht 5' 5\" (1.651 m)   Wt 67.6 kg (149 lb)   SpO2 97%   BMI 24.79 kg/m²       No past medical history on file.     Social History     Socioeconomic History    Marital status:      Spouse name: Not on file    Number of children: Not on file    Years of education: Not on file    Highest education level: Not on file   Occupational History    Not on file   Social Needs    Financial resource strain: Not on file    Food insecurity:     Worry: Not on file     Inability: Not on file    Transportation needs:     Medical: Not on file     Non-medical: Not on file Tobacco Use    Smoking status: Never Smoker    Smokeless tobacco: Never Used   Substance and Sexual Activity    Alcohol use: Not on file    Drug use: Not on file    Sexual activity: Not on file   Lifestyle    Physical activity:     Days per week: Not on file     Minutes per session: Not on file    Stress: Not on file   Relationships    Social connections:     Talks on phone: Not on file     Gets together: Not on file     Attends Islam service: Not on file     Active member of club or organization: Not on file     Attends meetings of clubs or organizations: Not on file     Relationship status: Not on file    Intimate partner violence:     Fear of current or ex partner: Not on file     Emotionally abused: Not on file     Physically abused: Not on file     Forced sexual activity: Not on file   Other Topics Concern    Not on file   Social History Narrative    Not on file       No family history on file. Past Surgical History:   Procedure Laterality Date    AL INS NEW/RPLCMT PRM PM W/TRANSV ELTRD ATRIAL&VENT Left 10/1/2019    INSERT PPM DUAL performed by Rodrigo Segovia MD at [facility] CATH LAB       Current Outpatient Medications   Medication Sig Dispense Refill    metoprolol succinate (TOPROL-XL) 25 mg XL tablet Take 1 Tab by mouth daily. 30 Tab 6    rosuvastatin (CRESTOR) 10 mg tablet       LEVOXYL 137 mcg tablet       RHOPRESSA 0.02 % drop          EK:1 atrial pacing new since 19  . ASSESSMENT and PLAN  Encounter Diagnoses   Name Primary?  PSVT (paroxysmal supraventricular tachycardia) (HCC) Yes    Tachycardia-bradycardia syndrome (HCC),s/p pacemaker 10/01/19     Syncope, unspecified syncope type     Dizziness     Palpitation     Hypercholesteremia    She has been doing very well. She has had no symptoms to indicate angina or cardiac decompensation. She has had no recurrent syncope. She has had no palpitation.      The interrogation of the pacemaker demonstrated recurrent and frequent episodes of nonsustained supraventricular tachycardia. At this time I would increase metoprolol to 50 mg twice a day. If she continues with the recurrent episodes of supraventricular tachycardia in a more prolonged pattern, I would then consider either antiarrhythmic treatment or ablation treatment at that point. Her future cardiac care will be taken over by Dr. Anabella Mesa in June 2020 upon my custodial.

## 2020-04-29 ENCOUNTER — OFFICE VISIT (OUTPATIENT)
Dept: CARDIOLOGY CLINIC | Age: 85
End: 2020-04-29

## 2020-04-29 DIAGNOSIS — Z95.0 PACEMAKER: ICD-10-CM

## 2020-04-29 DIAGNOSIS — I49.5 TACHYCARDIA-BRADYCARDIA SYNDROME (HCC): Primary | ICD-10-CM

## 2020-04-29 DIAGNOSIS — I47.1 PSVT (PAROXYSMAL SUPRAVENTRICULAR TACHYCARDIA) (HCC): ICD-10-CM

## 2020-04-29 NOTE — PROGRESS NOTES
I have personally seen and evaluated the device findings. Interrogation reviewed and I agree with assessment.     Noah Rausch

## 2020-05-22 RX ORDER — METOPROLOL TARTRATE 50 MG/1
TABLET ORAL
Qty: 180 TAB | Refills: 3 | Status: SHIPPED | OUTPATIENT
Start: 2020-05-22 | End: 2020-07-24

## 2020-07-22 ENCOUNTER — HOSPITAL ENCOUNTER (EMERGENCY)
Age: 85
Discharge: HOME | End: 2020-07-23
Attending: EMERGENCY MEDICINE | Admitting: INTERNAL MEDICINE
Payer: MEDICARE

## 2020-07-22 ENCOUNTER — APPOINTMENT (OUTPATIENT)
Dept: GENERAL RADIOLOGY | Age: 85
End: 2020-07-22
Attending: EMERGENCY MEDICINE
Payer: MEDICARE

## 2020-07-22 DIAGNOSIS — R00.2 PALPITATIONS: Primary | ICD-10-CM

## 2020-07-22 DIAGNOSIS — R00.0 TACHYCARDIA: ICD-10-CM

## 2020-07-22 LAB
ANION GAP SERPL CALC-SCNC: 4 MMOL/L (ref 3–18)
APPEARANCE UR: CLEAR
BASOPHILS # BLD: 0 K/UL (ref 0–0.1)
BASOPHILS NFR BLD: 0 % (ref 0–2)
BILIRUB UR QL: NEGATIVE
BUN SERPL-MCNC: 23 MG/DL (ref 7–18)
BUN/CREAT SERPL: 27 (ref 12–20)
CALCIUM SERPL-MCNC: 9.2 MG/DL (ref 8.5–10.1)
CHLORIDE SERPL-SCNC: 109 MMOL/L (ref 100–111)
CK MB CFR SERPL CALC: 1.7 % (ref 0–4)
CK MB SERPL-MCNC: 1.8 NG/ML (ref 5–25)
CK SERPL-CCNC: 108 U/L (ref 26–192)
CO2 SERPL-SCNC: 26 MMOL/L (ref 21–32)
COLOR UR: YELLOW
CREAT SERPL-MCNC: 0.85 MG/DL (ref 0.6–1.3)
D DIMER PPP FEU-MCNC: 0.82 UG/ML(FEU)
DIFFERENTIAL METHOD BLD: ABNORMAL
EOSINOPHIL # BLD: 0.2 K/UL (ref 0–0.4)
EOSINOPHIL NFR BLD: 3 % (ref 0–5)
ERYTHROCYTE [DISTWIDTH] IN BLOOD BY AUTOMATED COUNT: 13.7 % (ref 11.6–14.5)
GLUCOSE SERPL-MCNC: 147 MG/DL (ref 74–99)
GLUCOSE UR STRIP.AUTO-MCNC: NEGATIVE MG/DL
HCT VFR BLD AUTO: 40.5 % (ref 35–45)
HGB BLD-MCNC: 13.2 G/DL (ref 12–16)
HGB UR QL STRIP: NEGATIVE
KETONES UR QL STRIP.AUTO: NEGATIVE MG/DL
LACTATE BLD-SCNC: 0.69 MMOL/L (ref 0.4–2)
LEUKOCYTE ESTERASE UR QL STRIP.AUTO: ABNORMAL
LYMPHOCYTES # BLD: 2.6 K/UL (ref 0.9–3.6)
LYMPHOCYTES NFR BLD: 33 % (ref 21–52)
MCH RBC QN AUTO: 29.9 PG (ref 24–34)
MCHC RBC AUTO-ENTMCNC: 32.6 G/DL (ref 31–37)
MCV RBC AUTO: 91.6 FL (ref 74–97)
MONOCYTES # BLD: 1 K/UL (ref 0.05–1.2)
MONOCYTES NFR BLD: 12 % (ref 3–10)
NEUTS SEG # BLD: 4.2 K/UL (ref 1.8–8)
NEUTS SEG NFR BLD: 52 % (ref 40–73)
NITRITE UR QL STRIP.AUTO: NEGATIVE
PH UR STRIP: 7 [PH] (ref 5–8)
PLATELET # BLD AUTO: 190 K/UL (ref 135–420)
PMV BLD AUTO: 10.6 FL (ref 9.2–11.8)
POTASSIUM SERPL-SCNC: 3.3 MMOL/L (ref 3.5–5.5)
PROT UR STRIP-MCNC: NEGATIVE MG/DL
RBC # BLD AUTO: 4.42 M/UL (ref 4.2–5.3)
RBC #/AREA URNS HPF: NORMAL /HPF (ref 0–5)
SODIUM SERPL-SCNC: 139 MMOL/L (ref 136–145)
SP GR UR REFRACTOMETRY: 1.01 (ref 1–1.03)
TROPONIN I SERPL-MCNC: <0.02 NG/ML (ref 0–0.04)
TSH SERPL DL<=0.05 MIU/L-ACNC: 3.29 UIU/ML (ref 0.36–3.74)
UROBILINOGEN UR QL STRIP.AUTO: 1 EU/DL (ref 0.2–1)
WBC # BLD AUTO: 8 K/UL (ref 4.6–13.2)
WBC URNS QL MICRO: NORMAL /HPF (ref 0–4)

## 2020-07-22 PROCEDURE — 74011250637 HC RX REV CODE- 250/637: Performed by: EMERGENCY MEDICINE

## 2020-07-22 PROCEDURE — 85379 FIBRIN DEGRADATION QUANT: CPT

## 2020-07-22 PROCEDURE — 74011000250 HC RX REV CODE- 250: Performed by: EMERGENCY MEDICINE

## 2020-07-22 PROCEDURE — 85025 COMPLETE CBC W/AUTO DIFF WBC: CPT

## 2020-07-22 PROCEDURE — 80048 BASIC METABOLIC PNL TOTAL CA: CPT

## 2020-07-22 PROCEDURE — 74011250636 HC RX REV CODE- 250/636: Performed by: EMERGENCY MEDICINE

## 2020-07-22 PROCEDURE — 96361 HYDRATE IV INFUSION ADD-ON: CPT

## 2020-07-22 PROCEDURE — 83605 ASSAY OF LACTIC ACID: CPT

## 2020-07-22 PROCEDURE — 84443 ASSAY THYROID STIM HORMONE: CPT

## 2020-07-22 PROCEDURE — 93005 ELECTROCARDIOGRAM TRACING: CPT

## 2020-07-22 PROCEDURE — 81001 URINALYSIS AUTO W/SCOPE: CPT

## 2020-07-22 PROCEDURE — 71045 X-RAY EXAM CHEST 1 VIEW: CPT

## 2020-07-22 PROCEDURE — 99285 EMERGENCY DEPT VISIT HI MDM: CPT

## 2020-07-22 PROCEDURE — 96374 THER/PROPH/DIAG INJ IV PUSH: CPT

## 2020-07-22 PROCEDURE — 65270000029 HC RM PRIVATE

## 2020-07-22 PROCEDURE — 82550 ASSAY OF CK (CPK): CPT

## 2020-07-22 RX ORDER — DILTIAZEM HYDROCHLORIDE 5 MG/ML
INJECTION INTRAVENOUS
Status: DISPENSED
Start: 2020-07-22 | End: 2020-07-23

## 2020-07-22 RX ORDER — METOPROLOL SUCCINATE 50 MG/1
50 TABLET, EXTENDED RELEASE ORAL
Status: COMPLETED | OUTPATIENT
Start: 2020-07-22 | End: 2020-07-22

## 2020-07-22 RX ORDER — DILTIAZEM HYDROCHLORIDE 5 MG/ML
2.5 INJECTION INTRAVENOUS ONCE
Status: COMPLETED | OUTPATIENT
Start: 2020-07-22 | End: 2020-07-22

## 2020-07-22 RX ORDER — POTASSIUM CHLORIDE 20 MEQ/1
20 TABLET, EXTENDED RELEASE ORAL
Status: COMPLETED | OUTPATIENT
Start: 2020-07-22 | End: 2020-07-22

## 2020-07-22 RX ADMIN — DILTIAZEM HYDROCHLORIDE 2.5 MG: 5 INJECTION INTRAVENOUS at 19:25

## 2020-07-22 RX ADMIN — SODIUM CHLORIDE 500 ML: 900 INJECTION, SOLUTION INTRAVENOUS at 19:25

## 2020-07-22 RX ADMIN — METOPROLOL SUCCINATE 50 MG: 50 TABLET, EXTENDED RELEASE ORAL at 23:50

## 2020-07-22 RX ADMIN — SODIUM CHLORIDE 500 ML: 9 INJECTION, SOLUTION INTRAVENOUS at 22:35

## 2020-07-22 RX ADMIN — POTASSIUM CHLORIDE 20 MEQ: 1500 TABLET, EXTENDED RELEASE ORAL at 21:20

## 2020-07-22 NOTE — ED PROVIDER NOTES
Pt c/o sob/lightheadedness/fatigue, off and on w palpitations x 3 days. H/o same due to irreg ht beat per cardiology. On toprol for it. Mild chest pressure. No leg pain or swelling. No cough or fever. No focal weakness. No syncope, but has passed out in past due to it . No past medical history on file. Past Surgical History:   Procedure Laterality Date    AL INS NEW/RPLCMT PRM PM W/TRANSV ELTRD ATRIAL&VENT Left 10/1/2019    INSERT PPM DUAL performed by Estrellita Roberts MD at [address] [place] [address]         No family history on file.     Social History     Socioeconomic History    Marital status:      Spouse name: Not on file    Number of children: Not on file    Years of education: Not on file    Highest education level: Not on file   Occupational History    Not on file   Social Needs    Financial resource strain: Not on file    Food insecurity     Worry: Not on file     Inability: Not on file    Transportation needs     Medical: Not on file     Non-medical: Not on file   Tobacco Use    Smoking status: Never Smoker    Smokeless tobacco: Never Used   Substance and Sexual Activity    Alcohol use: Not on file    Drug use: Not on file    Sexual activity: Not on file   Lifestyle    Physical activity     Days per week: Not on file     Minutes per session: Not on file    Stress: Not on file   Relationships    Social connections     Talks on phone: Not on file     Gets together: Not on file     Attends Jew service: Not on file     Active member of club or organization: Not on file     Attends meetings of clubs or organizations: Not on file     Relationship status: Not on file    Intimate partner violence     Fear of current or ex partner: Not on file     Emotionally abused: Not on file     Physically abused: Not on file     Forced sexual activity: Not on file   Other Topics Concern    Not on file   Social History Narrative    Not on file         ALLERGIES: Sulfur    Review of Systems   Constitutional: Positive for fatigue. Negative for fever. HENT: Negative for congestion. Respiratory: Positive for shortness of breath. Negative for cough. Cardiovascular: Positive for chest pain and palpitations. Gastrointestinal: Negative for abdominal pain and vomiting. Musculoskeletal: Negative for back pain. Skin: Negative for rash. Neurological: Positive for light-headedness. All other systems reviewed and are negative. Vitals:    07/22/20 2230 07/22/20 2245 07/22/20 2300 07/22/20 2315   BP: 168/82 147/79  136/88   Pulse: 89 67 70 (!) 159   Resp: 22 19 21 22   Temp:    98.2 °F (36.8 °C)   SpO2: 99% 97% 97% 96%   Weight:       Height:                Physical Exam  Vitals signs and nursing note reviewed. Constitutional:       Appearance: She is well-developed. She is not diaphoretic. HENT:      Head: Normocephalic and atraumatic. Eyes:      Pupils: Pupils are equal, round, and reactive to light. Neck:      Musculoskeletal: Normal range of motion. Cardiovascular:      Rate and Rhythm: Regular rhythm. Tachycardia present. Heart sounds: No murmur. Pulmonary:      Effort: Pulmonary effort is normal.      Breath sounds: No wheezing. Abdominal:      Palpations: Abdomen is soft. Tenderness: There is no abdominal tenderness. Musculoskeletal:         General: No tenderness. Skin:     General: Skin is dry. Capillary Refill: Capillary refill takes less than 2 seconds. Findings: No rash. Neurological:      Mental Status: She is alert and oriented to person, place, and time.           MDM       Procedures        Vitals:  Patient Vitals for the past 12 hrs:   Temp Pulse Resp BP SpO2   07/22/20 2315 98.2 °F (36.8 °C) (!) 159 22 136/88 96 %   07/22/20 2300  70 21  97 %   07/22/20 2245  67 19 147/79 97 %   07/22/20 2230  89 22 168/82 99 %   07/22/20 2215  (!) 154 19 (!) 134/119    07/22/20 2200  81 18 (!) 147/115 99 %   07/22/20 2145  79 21 136/80 98 %   07/22/20 2130 98 °F (36.7 °C) 78 16 136/82 97 %   07/22/20 2120  63   98 %   07/22/20 2110  68 21 133/70 91 %   07/22/20 2100  65 23 120/68 97 %   07/22/20 2050  65 21 128/70 98 %   07/22/20 2040  (!) 37 24 134/64 98 %   07/22/20 2030  66 16 131/83 99 %   07/22/20 2020  70 18 140/79 96 %   07/22/20 2010  73 21 (!) 116/95 97 %   07/22/20 2000  (!) 104 21 124/61 97 %   07/22/20 1950  92 17  96 %   07/22/20 1940  86 22 144/75 95 %   07/22/20 1845 97.9 °F (36.6 °C) (!) 166 17 155/88 98 %         Medications ordered:   Medications   dilTIAZem (CARDIZEM) injection 2.5 mg (has no administration in time range)   sodium chloride 0.9 % bolus infusion 500 mL (has no administration in time range)   metoprolol succinate (TOPROL-XL) XL tablet 50 mg (has no administration in time range)   potassium chloride (K-DUR, KLOR-CON) SR tablet 20 mEq (20 mEq Oral Given 7/22/20 2120)   sodium chloride 0.9 % bolus infusion 500 mL (500 mL IntraVENous New Bag 7/22/20 2235)         Lab findings:  Recent Results (from the past 12 hour(s))   CBC WITH AUTOMATED DIFF    Collection Time: 07/22/20  6:30 PM   Result Value Ref Range    WBC 8.0 4.6 - 13.2 K/uL    RBC 4.42 4.20 - 5.30 M/uL    HGB 13.2 12.0 - 16.0 g/dL    HCT 40.5 35.0 - 45.0 %    MCV 91.6 74.0 - 97.0 FL    MCH 29.9 24.0 - 34.0 PG    MCHC 32.6 31.0 - 37.0 g/dL    RDW 13.7 11.6 - 14.5 %    PLATELET 562 196 - 555 K/uL    MPV 10.6 9.2 - 11.8 FL    NEUTROPHILS 52 40 - 73 %    LYMPHOCYTES 33 21 - 52 %    MONOCYTES 12 (H) 3 - 10 %    EOSINOPHILS 3 0 - 5 %    BASOPHILS 0 0 - 2 %    ABS. NEUTROPHILS 4.2 1.8 - 8.0 K/UL    ABS. LYMPHOCYTES 2.6 0.9 - 3.6 K/UL    ABS. MONOCYTES 1.0 0.05 - 1.2 K/UL    ABS. EOSINOPHILS 0.2 0.0 - 0.4 K/UL    ABS.  BASOPHILS 0.0 0.0 - 0.1 K/UL    DF AUTOMATED     METABOLIC PANEL, BASIC    Collection Time: 07/22/20  6:30 PM   Result Value Ref Range    Sodium 139 136 - 145 mmol/L    Potassium 3.3 (L) 3.5 - 5.5 mmol/L    Chloride 109 100 - 111 mmol/L    CO2 26 21 - 32 mmol/L    Anion gap 4 3.0 - 18 mmol/L    Glucose 147 (H) 74 - 99 mg/dL    BUN 23 (H) 7.0 - 18 MG/DL    Creatinine 0.85 0.6 - 1.3 MG/DL    BUN/Creatinine ratio 27 (H) 12 - 20      GFR est AA >60 >60 ml/min/1.73m2    GFR est non-AA >60 >60 ml/min/1.73m2    Calcium 9.2 8.5 - 10.1 MG/DL   D DIMER    Collection Time: 07/22/20  6:30 PM   Result Value Ref Range    D DIMER 0.82 (H) <0.46 ug/ml(FEU)   CARDIAC PANEL,(CK, CKMB & TROPONIN)    Collection Time: 07/22/20  6:30 PM   Result Value Ref Range    CK - MB 1.8 <3.6 ng/ml    CK-MB Index 1.7 0.0 - 4.0 %     26 - 192 U/L    Troponin-I, QT <0.02 0.0 - 0.045 NG/ML   TSH 3RD GENERATION    Collection Time: 07/22/20  6:30 PM   Result Value Ref Range    TSH 3.29 0.36 - 3.74 uIU/mL   POC LACTIC ACID    Collection Time: 07/22/20  8:12 PM   Result Value Ref Range    Lactic Acid (POC) 0.69 0.40 - 2.00 mmol/L   URINALYSIS W/ RFLX MICROSCOPIC    Collection Time: 07/22/20  8:13 PM   Result Value Ref Range    Color YELLOW      Appearance CLEAR      Specific gravity 1.006 1.005 - 1.030      pH (UA) 7.0 5.0 - 8.0      Protein Negative NEG mg/dL    Glucose Negative NEG mg/dL    Ketone Negative NEG mg/dL    Bilirubin Negative NEG      Blood Negative NEG      Urobilinogen 1.0 0.2 - 1.0 EU/dL    Nitrites Negative NEG      Leukocyte Esterase TRACE (A) NEG     URINE MICROSCOPIC ONLY    Collection Time: 07/22/20  8:13 PM   Result Value Ref Range    WBC 0 to 3 0 - 4 /hpf    RBC 0 to 3 0 - 5 /hpf           X-Ray, CT or other radiology findings or impressions:  XR CHEST PORT    (Results Pending)       Progress notes, Consult notes or additional Procedure notes:   Intermittent st in 160-170's, given dilt 2.5, markedly improved at 7:50 PM   9:47 PM d/w np cassidy, to f/u in office if resolves or consult if admit  Continued intermittent tachy  11:40 PM d/w dr Shalom Jarrett, to admit  Return of freq tachycardia. Nighttime toprol given, to cont close monitoring.    12:46 AM no recent tachycardia, improved, to cont close monitoring. Diagnosis:   1. Palpitations    2.  Tachycardia        Disposition:     Follow-up Information     Follow up With Specialties Details Why [address], Samina [facility] Schedule an appointment as soon as possible for a visit in 1 day  [facility] [address] [facility]             Patient's Medications   Start Taking    No medications on file   Continue Taking    LEVOXYL 137 MCG TABLET        METOPROLOL TARTRATE (LOPRESSOR) 50 MG TABLET    TAKE 1 TABLET BY MOUTH TWICE DAILY    RHOPRESSA 0.02 % DROP        ROSUVASTATIN (CRESTOR) 10 MG TABLET       These Medications have changed    No medications on file   Stop Taking    No medications on file

## 2020-07-23 VITALS
TEMPERATURE: 97.9 F | HEART RATE: 74 BPM | RESPIRATION RATE: 17 BRPM | DIASTOLIC BLOOD PRESSURE: 88 MMHG | HEIGHT: 65 IN | OXYGEN SATURATION: 98 % | WEIGHT: 150 LBS | BODY MASS INDEX: 24.99 KG/M2 | SYSTOLIC BLOOD PRESSURE: 143 MMHG

## 2020-07-23 LAB
ATRIAL RATE: 172 BPM
ATRIAL RATE: 84 BPM
CALCULATED P AXIS, ECG09: 53 DEGREES
CALCULATED R AXIS, ECG10: -26 DEGREES
CALCULATED R AXIS, ECG10: -30 DEGREES
CALCULATED T AXIS, ECG11: 133 DEGREES
CALCULATED T AXIS, ECG11: 44 DEGREES
DIAGNOSIS, 93000: NORMAL
DIAGNOSIS, 93000: NORMAL
P-R INTERVAL, ECG05: 120 MS
P-R INTERVAL, ECG05: 178 MS
Q-T INTERVAL, ECG07: 268 MS
Q-T INTERVAL, ECG07: 360 MS
QRS DURATION, ECG06: 90 MS
QRS DURATION, ECG06: 92 MS
QTC CALCULATION (BEZET), ECG08: 425 MS
QTC CALCULATION (BEZET), ECG08: 453 MS
VENTRICULAR RATE, ECG03: 172 BPM
VENTRICULAR RATE, ECG03: 84 BPM

## 2020-07-23 PROCEDURE — 74011250637 HC RX REV CODE- 250/637: Performed by: EMERGENCY MEDICINE

## 2020-07-23 PROCEDURE — 74011250636 HC RX REV CODE- 250/636: Performed by: EMERGENCY MEDICINE

## 2020-07-23 RX ORDER — SODIUM CHLORIDE 9 MG/ML
100 INJECTION, SOLUTION INTRAVENOUS ONCE
Status: COMPLETED | OUTPATIENT
Start: 2020-07-23 | End: 2020-07-23

## 2020-07-23 RX ADMIN — SODIUM CHLORIDE 100 ML/HR: 900 INJECTION, SOLUTION INTRAVENOUS at 00:30

## 2020-07-23 RX ADMIN — METOPROLOL TARTRATE 75 MG: 25 TABLET, FILM COATED ORAL at 10:08

## 2020-07-23 NOTE — ED NOTES
Assumed care of pt resting quietly in bed. Iv NSS infusing at 100cc/hr via dial a flow.  Monitor showing SR

## 2020-07-23 NOTE — DISCHARGE INSTRUCTIONS
Increase your metoprolol to 75 mg twice daily. Return for pain, fever not resolving with motrin or tylenol, shortness of breath, vomiting, decreased fluid intake, weakness, numbness, dizziness, or any change or concerns - or for further monitoring/admissoin as offered. Patient Education        Palpitations: Care Instructions  Your Care Instructions     Heart palpitations are the uncomfortable sensation that your heart is beating fast or irregularly. You might feel pounding or fluttering in your chest. It might feel like your heart is skipping a beat. Although palpitations may be caused by a heart problem, they also occur because of stress, fatigue, or use of alcohol, caffeine, or nicotine. Many medicines, including diet pills, antihistamines, decongestants, and some herbal products, can cause heart palpitations. Nearly everyone has palpitations from time to time. Depending on your symptoms, your doctor may need to do more tests to try to find the cause of your palpitations. Follow-up care is a key part of your treatment and safety. Be sure to make and go to all appointments, and call your doctor if you are having problems. It's also a good idea to know your test results and keep a list of the medicines you take. How can you care for yourself at home? · Avoid caffeine, nicotine, and excess alcohol. · Do not take illegal drugs, such as methamphetamines and cocaine. · Do not take weight loss or diet medicines unless you talk with your doctor first.  · Get plenty of sleep. · Do not overeat. · If you have palpitations again, take deep breaths and try to relax. This may slow a racing heart. · If you start to feel lightheaded, lie down to avoid injuries that might result if you pass out and fall down. · Keep a record of your palpitations and bring it to your next doctor's appointment. Write down:  ? The date and time. ? Your pulse.  (If your heart is beating fast, it may be hard to count your pulse.)  ? What you were doing when the palpitations started. ? How long the palpitations lasted. ? Any other symptoms. · If an activity causes palpitations, slow down or stop. Talk to your doctor before you do that activity again. · Take your medicines exactly as prescribed. Call your doctor if you think you are having a problem with your medicine. When should you call for help? [id number] anytime you think you may need emergency care. For example, call if:  · You passed out (lost consciousness). · You have symptoms of a heart attack. These may include:  ? Chest pain or pressure, or a strange feeling in the chest.  ? Sweating. ? Shortness of breath. ? Pain, pressure, or a strange feeling in the back, neck, jaw, or upper belly or in one or both shoulders or arms. ? Lightheadedness or sudden weakness. ? A fast or irregular heartbeat. After you call 911, the  may tell you to chew 1 adult-strength or 2 to 4 low-dose aspirin. Wait for an ambulance. Do not try to drive yourself. · You have symptoms of a stroke. These may include:  ? Sudden numbness, tingling, weakness, or loss of movement in your face, arm, or leg, especially on only one side of your body. ? Sudden vision changes. ? Sudden trouble speaking. ? Sudden confusion or trouble understanding simple statements. ? Sudden problems with walking or balance. ? A sudden, severe headache that is different from past headaches. Call your doctor now or seek immediate medical care if:  · You have heart palpitations and:  ? Are dizzy or lightheaded, or you feel like you may faint. ? Have new or increased shortness of breath. Watch closely for changes in your health, and be sure to contact your doctor if:  · You continue to have heart palpitations. Where can you learn more? Go to http://anne-korey.info/  Enter R508 in the search box to learn more about \"Palpitations: Care Instructions. \"  Current as of: December 16, 2018               [facility] Version: 12.5  © 9875-9772 [employer]. Care instructions adapted under license by [employer] (which disclaims liability or warranty for this information). If you have questions about a medical condition or this instruction, always ask your healthcare professional. [address] any warranty or liability for your use of this information.

## 2020-07-23 NOTE — ED NOTES
The documentation for this period is being entered following the guidelines as defined in the [facility] policy by William Rivera RN.

## 2020-07-23 NOTE — ED NOTES
Bedside and Verbal shift change report given to [facility]  (oncoming nurse) by Rah Ferguson RN (offgoing nurse). Report included the following information SBAR, Kardex, MAR, Recent Results and Cardiac Rhythm - Normal Sinus Rhythm. Adán Gallo

## 2020-07-23 NOTE — ED NOTES
When I arrived for my shift this am, pt was already admitted and awaiting bed assignment. Admitted for palpitations lightheadedness.  2.5mg Diltiazem improved 's to 60's. Reassessed patient and she is requesting to be sent home and has no new complaint of palpitations currently. Her heart rate has been in the 60s 70s for over 2 hours here in ED.    9:50 AM  I discussed further with VINNY Guardado from cardiology. She states that she and Dr. Darwin Gómez reviewed patient's chart and info and they are okay with her going home. States patient has a pacemaker and is okay with giving her more metoprolol in light of pacemaker. States have her now take 75 mg of metoprolol twice daily and give her 75 mg now to see how she does. If okay can discharge home from cardiology standpoint. She states patient already has an appointment outpatient with them tomorrow    Patient confirmed that she has an appointment to see cardiology tomorrow afternoon. Patient is happy to be going home told her to return to the ED if any worsening symptoms. Patient Vitals for the past 4 hrs:   Temp Pulse Resp BP SpO2   07/23/20 0900  74 17 143/88 98 %   07/23/20 0830  84 18 97/72 97 %   07/23/20 0800  71 17 128/68 95 %   07/23/20 0730  61 21 133/63 96 %   07/23/20 0700 97.9 °F (36.6 °C) 62 15 128/69 97 %   07/23/20 0630  65 14 140/70 98 %         10:39 AM  Reassessed patient and she is in no distress no new complaint, heart rate in the 60s after metoprolol 75 mg. We will discharge her home. I have discussed with patient that she will need to now take 75 mg metoprolol twice daily. She fully understands.     Follow-up Information     Follow up With Specialties Details Why [address] Rob[address]ley [address], DO Cardiology  Keep your appointment tomorrow, 7/24/2020 without fail [place] Fel[place] [id number]  [phone]      [address] [facility] EMERGENCY DEPT Emergency Medicine  As needed, If symptoms worsen [address] [facility] [address] [id number] [phone]

## 2020-07-24 ENCOUNTER — CLINICAL SUPPORT (OUTPATIENT)
Dept: CARDIOLOGY CLINIC | Age: 85
End: 2020-07-24

## 2020-07-24 ENCOUNTER — OFFICE VISIT (OUTPATIENT)
Dept: CARDIOLOGY CLINIC | Age: 85
End: 2020-07-24

## 2020-07-24 VITALS
HEIGHT: 65 IN | BODY MASS INDEX: 25.16 KG/M2 | OXYGEN SATURATION: 98 % | WEIGHT: 151 LBS | SYSTOLIC BLOOD PRESSURE: 118 MMHG | HEART RATE: 85 BPM | DIASTOLIC BLOOD PRESSURE: 72 MMHG

## 2020-07-24 DIAGNOSIS — I49.5 TACHYCARDIA-BRADYCARDIA SYNDROME (HCC): Primary | ICD-10-CM

## 2020-07-24 DIAGNOSIS — I49.5 TACHYCARDIA-BRADYCARDIA SYNDROME (HCC): ICD-10-CM

## 2020-07-24 DIAGNOSIS — Z95.0 PACEMAKER: ICD-10-CM

## 2020-07-24 DIAGNOSIS — R42 DIZZINESS: ICD-10-CM

## 2020-07-24 DIAGNOSIS — I47.1 PSVT (PAROXYSMAL SUPRAVENTRICULAR TACHYCARDIA) (HCC): Primary | ICD-10-CM

## 2020-07-24 RX ORDER — METOPROLOL TARTRATE 75 MG/1
75 TABLET, FILM COATED ORAL 2 TIMES DAILY
Qty: 45 TAB | Refills: 5 | Status: SHIPPED | OUTPATIENT
Start: 2020-07-24 | End: 2021-01-06 | Stop reason: SDUPTHER

## 2020-07-24 NOTE — PROGRESS NOTES
Giorgio Garcias presents today for   Chief Complaint   Patient presents with   Ayanna Barrientos ED Follow-up     6 month follow up - was also in ED on 7/22 for palps     Dizziness     sometimes    Palpitations     racing       Giorgio Garcias preferred language for health care discussion is english/other. Is someone accompanying this pt? Daughter     Is the patient using any DME equipment during [address]? no    Depression Screening:  3 most recent PHQ Screens 7/24/2020   Little interest or pleasure in doing things Not at all   Feeling down, depressed, irritable, or hopeless Not at all   Total Score PHQ 2 0       Learning Assessment:  Learning Assessment 7/10/2019   PRIMARY LEARNER Patient   BARRIERS PRIMARY LEARNER [address] CAREGIVER No   PRIMARY LANGUAGE ENGLISH   LEARNER PREFERENCE PRIMARY READING   ANSWERED BY patient   RELATIONSHIP SELF       Abuse Screening:  Abuse Screening Questionnaire 7/24/2020   Do you ever feel afraid of your partner? N   Are you in a relationship with someone who physically or mentally threatens you? N   Is it safe for you to go home? Y       Fall Risk  Fall Risk Assessment, last 12 mths 7/24/2020   Able to walk? Yes   Fall in past 12 months? No       Pt currently taking Anticoagulant therapy? no    Coordination of Care:  1. Have you been to the ER, urgent care clinic since your last visit? Hospitalized since your last visit? 7/22 for palps     2. Have you seen or consulted any other health care providers outside of the [address] [place] [address] since your last visit? Include any pap smears or colon screening.  no

## 2020-07-24 NOTE — PROGRESS NOTES
Charis De Santiago    Chief Complaint   Patient presents with   [facility] ED Follow-up     6 month follow up - was also in ED on 7/22 for palps     Dizziness     sometimes    Palpitations     racing       HPI    Charis De Santiago is a 82 y.o. with SSS s/p PPM 2019, SVT, here for ER follow up. Was at [facility] ER yesterday due to palps, SVT. Had not been taking her Metoprolol regularly. This is an ongoing issue- gets dizzy, has passed out in past due to tachycardia. Daughter comes with today, she lives at home with her . She underwent pacemaker implantation on 10/1/19. She received the Medtronic MRI compatible pacemaker. She was found to have bradytachy arrhythmia on the 30-day event recorder. She had frequent episodes of nonsustained supraventricular tachycardia up to 169 bpm along with significant sinus bradycardia.      Interrogation of the pacemaker demonstrated atrial pacing and recurrent episodes of frequent supraventricular tachycardia.      She underwent echocardiogram on 9/4/19 which demonstrated normal left ventricular systolic function with EF in the 55-60% range. There was no significant valvular pathology and no evidence of mitral valve prolapse. There was no significant pulmonary hypertension.      She underwent a 30-day event recorder which demonstrated frequent episodes of nonsustained supraventricular tachycardia up to 169 bpm. She also has had sinus bradycardia in the low to mid 40's mostly during the night in sleep but also occurring in the daytime as well. There were no significant pauses or sustained ventricular tachycardia.       No past medical history on file.     Past Surgical History:   Procedure Laterality Date    MO INS NEW/RPLCMT PRM PM W/TRANSV ELTRD ATRIAL&VENT Left 10/1/2019    INSERT PPM DUAL performed by Opal Godwin MD at [facility] CATH LAB       Current Outpatient Medications   Medication Sig Dispense Refill    metoprolol tartrate (LOPRESSOR) 50 mg tablet TAKE 1 TABLET BY MOUTH TWICE DAILY 180 Tab 3    rosuvastatin (CRESTOR) 10 mg tablet       LEVOXYL 137 mcg tablet       RHOPRESSA 0.02 % drop          Allergies   Allergen Reactions    Sulfur Other (comments)     reflux       Social History     Socioeconomic History    Marital status:      Spouse name: Not on file    Number of children: Not on file    Years of education: Not on file    Highest education level: Not on file   Occupational History    Not on file   Social Needs    Financial resource strain: Not on file    Food insecurity     Worry: Not on file     Inability: Not on file    Transportation needs     Medical: Not on file     Non-medical: Not on file   Tobacco Use    Smoking status: Never Smoker    Smokeless tobacco: Never Used   Substance and Sexual Activity    Alcohol use: Not on file    Drug use: Not on file    Sexual activity: Not on file   Lifestyle    Physical activity     Days per week: Not on file     Minutes per session: Not on file    Stress: Not on file   Relationships    Social connections     Talks on phone: Not on file     Gets together: Not on file     Attends Spiritism service: Not on file     Active member of club or organization: Not on file     Attends meetings of clubs or organizations: Not on file     Relationship status: Not on file    Intimate partner violence     Fear of current or ex partner: Not on file     Emotionally abused: Not on file     Physically abused: Not on file     Forced sexual activity: Not on file   Other Topics Concern    Not on file   Social History Narrative    Not on file        FH; n/a    Review of Systems    14 pt Review of Systems is negative unless otherwise mentioned in the HPI.     Wt Readings from Last 3 Encounters:   07/24/20 68.5 kg (151 lb)   07/22/20 68 kg (150 lb)   01/21/20 67.6 kg (149 lb)     Temp Readings from Last 3 Encounters:   07/23/20 97.9 °F (36.6 °C)   10/02/19 98.1 °F (36.7 °C)     BP Readings from Last 3 Encounters:   07/24/20 118/72 07/23/20 143/88   01/21/20 138/84     Pulse Readings from Last 3 Encounters:   07/24/20 85   07/23/20 74   01/21/20 63       09/04/19   ECHO ADULT COMPLETE 09/04/2019 9/4/2019    Narrative · Left Ventricle: Normal cavity size, wall thickness and systolic function   (ejection fraction normal). Estimated left ventricular ejection fraction   is 56 - 60%. Biplane method used to measure ejection fraction. No regional   wall motion abnormality noted. Normal left ventricular strain. Age-appropriate left ventricular diastolic function. · Mitral Valve: Trace mitral valve regurgitation. · Pulmonary Artery: There is no evidence of pulmonary hypertension. Signed by: Evelyn Allan DO       Physical Exam:    Visit Vitals  /72 (BP 1 Location: Left arm, BP Patient Position: Sitting)   Pulse 85   Ht 5' 5\" (1.651 m)   Wt 68.5 kg (151 lb)   SpO2 98%   BMI 25.13 kg/m²      Physical Exam  HENT:      Head: Normocephalic and atraumatic. Eyes:      Pupils: Pupils are equal, round, and reactive to light. Cardiovascular:      Rate and Rhythm: Normal rate and regular rhythm. Heart sounds: Normal heart sounds. No murmur. No friction rub. No gallop. Pulmonary:      Effort: Pulmonary effort is normal. No respiratory distress. Breath sounds: Normal breath sounds. No wheezing or rales. Chest:      Chest wall: No tenderness. Abdominal:      General: Bowel sounds are normal.      Palpations: Abdomen is soft. Musculoskeletal:         General: No tenderness. Skin:     General: Skin is warm and dry. Neurological:      Mental Status: She is alert and oriented to person, place, and time.          Device check- several episodes of SVT, does have a couple bouts of AFib    Lab Results   Component Value Date/Time    Sodium 139 07/22/2020 06:30 PM    Potassium 3.3 (L) 07/22/2020 06:30 PM    Chloride 109 07/22/2020 06:30 PM    CO2 26 07/22/2020 06:30 PM    Anion gap 4 07/22/2020 06:30 PM    Glucose 147 (H) 07/22/2020 06:30 PM    BUN 23 (H) 07/22/2020 06:30 PM    Creatinine 0.85 07/22/2020 06:30 PM    BUN/Creatinine ratio 27 (H) 07/22/2020 06:30 PM    GFR est AA >60 07/22/2020 06:30 PM    GFR est non-AA >60 07/22/2020 06:30 PM    Calcium 9.2 07/22/2020 06:30 PM    Bilirubin, total 0.4 09/23/2019 09:49 AM    Alk. phosphatase 66 09/23/2019 09:49 AM    Protein, total 7.1 09/23/2019 09:49 AM    Albumin 3.8 09/23/2019 09:49 AM    Globulin 3.3 09/23/2019 09:49 AM    A-G Ratio 1.2 09/23/2019 09:49 AM    ALT (SGPT) 25 09/23/2019 09:49 AM    AST (SGOT) 18 09/23/2019 09:49 AM     Lab Results   Component Value Date/Time    TSH 3.29 07/22/2020 06:30 PM         Impression and Plan:  Bria Lamas is a 82 y.o. with:    1.) SSS s/p PPM 2019  2.) SVT, typically controlled on BB- wasn't taking regularly  3.) Newly recognized AFib, short bouts on device check in office today  4.) HypoK, replacing and needs recheck    1.) Stay on Metoprolol 75 BID, lets see if controls symptoms  2.) Recheck in 3 months and if improved- need to talk about anticoagulation     At this point so symptomatic- unsteady, falling, dizzy- I wouldn't want to start her on anticoagulation    The AFib is newly recognized, and she was already hesitant to take the BB- so I anticipate convincing her of additional meds is going to be hard    If I cant improve her symptoms, we did discuss further EP eval- as Dr. Adam Been has mentioned to her in the past.    >60 mins spent    Thank you for allowing me to participate in the care of your patient, please do not hesitate to call with questions or concerns.     [address],    Hiren Fairly, DO

## 2020-07-27 NOTE — PROGRESS NOTES
I have personally seen and evaluated the device findings. Interrogation reviewed and I agree with assessment.     Ellen Salgado

## 2020-10-23 ENCOUNTER — OFFICE VISIT (OUTPATIENT)
Dept: CARDIOLOGY CLINIC | Age: 85
End: 2020-10-23
Payer: MEDICARE

## 2020-10-23 VITALS
HEIGHT: 65 IN | HEART RATE: 72 BPM | SYSTOLIC BLOOD PRESSURE: 120 MMHG | OXYGEN SATURATION: 97 % | WEIGHT: 149.6 LBS | DIASTOLIC BLOOD PRESSURE: 68 MMHG | BODY MASS INDEX: 24.93 KG/M2

## 2020-10-23 DIAGNOSIS — I48.0 PAROXYSMAL ATRIAL FIBRILLATION (HCC): ICD-10-CM

## 2020-10-23 DIAGNOSIS — I47.1 PSVT (PAROXYSMAL SUPRAVENTRICULAR TACHYCARDIA) (HCC): Primary | ICD-10-CM

## 2020-10-23 PROCEDURE — G8510 SCR DEP NEG, NO PLAN REQD: HCPCS | Performed by: INTERNAL MEDICINE

## 2020-10-23 PROCEDURE — G8420 CALC BMI NORM PARAMETERS: HCPCS | Performed by: INTERNAL MEDICINE

## 2020-10-23 PROCEDURE — G8400 PT W/DXA NO RESULTS DOC: HCPCS | Performed by: INTERNAL MEDICINE

## 2020-10-23 PROCEDURE — G8427 DOCREV CUR MEDS BY ELIG CLIN: HCPCS | Performed by: INTERNAL MEDICINE

## 2020-10-23 PROCEDURE — G8536 NO DOC ELDER MAL SCRN: HCPCS | Performed by: INTERNAL MEDICINE

## 2020-10-23 PROCEDURE — 99214 OFFICE O/P EST MOD 30 MIN: CPT | Performed by: INTERNAL MEDICINE

## 2020-10-23 NOTE — PROGRESS NOTES
Jane Dumont    Chief Complaint   Patient presents with    Follow-up     6 month follow up       HPI    Jane Dumont is a 82 y.o. with SSS s/p PPM 2019, SVT, here for routine follow up. She underwent pacemaker implantation on 10/1/19. She received the Medtronic MRI compatible pacemaker. She was found to have bradytachy arrhythmia on the 30-day event recorder. She had frequent episodes of nonsustained supraventricular tachycardia up to 169 bpm along with significant sinus bradycardia.      Interrogation of the pacemaker demonstrated atrial pacing and recurrent episodes of frequent supraventricular tachycardia. I met her after an episode of SVT (she came with her daughter s/p ER) and has really improved since I started BB. She doesn't even seem to remember how bad her symptoms were. She tells me really enjoys her yard, loves walking her dog with her  looking at the trees. Uses a walking stick for balance but no falls.     She underwent echocardiogram on 9/4/19 which demonstrated normal left ventricular systolic function with EF in the 55-60% range. There was no significant valvular pathology and no evidence of mitral valve prolapse. There was no significant pulmonary hypertension.      No past medical history on file. Past Surgical History:   Procedure Laterality Date    DC INS NEW/RPLCMT PRM PM W/TRANSV ELTRD ATRIAL&VENT Left 10/1/2019    INSERT PPM DUAL performed by Pretty Julian MD at [facility] CATH LAB       Current Outpatient Medications   Medication Sig Dispense Refill    metoprolol tartrate 75 mg tab Take 75 mg by mouth two (2) times a day.  45 Tab 5    rosuvastatin (CRESTOR) 10 mg tablet       LEVOXYL 137 mcg tablet       RHOPRESSA 0.02 % drop          Allergies   Allergen Reactions    Sulfur Other (comments)     reflux       Social History     Socioeconomic History    Marital status:      Spouse name: Not on file    Number of children: Not on file    Years of education: Not on file    Highest education level: Not on file   Occupational History    Not on file   Social Needs    Financial resource strain: Not on file    Food insecurity     Worry: Not on file     Inability: Not on file    Transportation needs     Medical: Not on file     Non-medical: Not on file   Tobacco Use    Smoking status: Never Smoker    Smokeless tobacco: Never Used   Substance and Sexual Activity    Alcohol use: Not on file    Drug use: Not on file    Sexual activity: Not on file   Lifestyle    Physical activity     Days per week: Not on file     Minutes per session: Not on file    Stress: Not on file   Relationships    Social connections     Talks on phone: Not on file     Gets together: Not on file     Attends Worship service: Not on file     Active member of club or organization: Not on file     Attends meetings of clubs or organizations: Not on file     Relationship status: Not on file    Intimate partner violence     Fear of current or ex partner: Not on file     Emotionally abused: Not on file     Physically abused: Not on file     Forced sexual activity: Not on file   Other Topics Concern    Not on file   Social History Narrative    Not on file        FH; n/a    Review of Systems    14 pt Review of Systems is negative unless otherwise mentioned in the HPI. Wt Readings from Last 3 Encounters:   10/23/20 67.9 kg (149 lb 9.6 oz)   07/24/20 68.5 kg (151 lb)   07/22/20 68 kg (150 lb)     Temp Readings from Last 3 Encounters:   07/23/20 97.9 °F (36.6 °C)   10/02/19 98.1 °F (36.7 °C)     BP Readings from Last 3 Encounters:   10/23/20 120/68   07/24/20 118/72   07/23/20 143/88     Pulse Readings from Last 3 Encounters:   10/23/20 72   07/24/20 85   07/23/20 74       09/04/19   ECHO ADULT COMPLETE 09/04/2019 9/4/2019    Narrative · Left Ventricle: Normal cavity size, wall thickness and systolic function   (ejection fraction normal). Estimated left ventricular ejection fraction   is 56 - 60%. Biplane method used to measure ejection fraction. No regional   wall motion abnormality noted. Normal left ventricular strain. Age-appropriate left ventricular diastolic function. · Mitral Valve: Trace mitral valve regurgitation. · Pulmonary Artery: There is no evidence of pulmonary hypertension. Signed by: Jolly Rao DO       Physical Exam:    Visit Vitals  /68 (BP 1 Location: Left arm, BP Patient Position: Sitting)   Pulse 72   Ht 5' 5\" (1.651 m)   Wt 67.9 kg (149 lb 9.6 oz)   SpO2 97%   BMI 24.89 kg/m²      Physical Exam  HENT:      Head: Normocephalic and atraumatic. Eyes:      Pupils: Pupils are equal, round, and reactive to light. Cardiovascular:      Rate and Rhythm: Normal rate and regular rhythm. Heart sounds: Normal heart sounds. No murmur. No friction rub. No gallop. Pulmonary:      Effort: Pulmonary effort is normal. No respiratory distress. Breath sounds: Normal breath sounds. No wheezing or rales. Chest:      Chest wall: No tenderness. Abdominal:      General: Bowel sounds are normal.      Palpations: Abdomen is soft. Musculoskeletal:         General: No tenderness. Skin:     General: Skin is warm and dry. Neurological:      Mental Status: She is alert and oriented to person, place, and time. Device check- several episodes of SVT, does have a couple bouts of AFib last time, todays check still pending    Lab Results   Component Value Date/Time    Sodium 139 07/22/2020 06:30 PM    Potassium 3.3 (L) 07/22/2020 06:30 PM    Chloride 109 07/22/2020 06:30 PM    CO2 26 07/22/2020 06:30 PM    Anion gap 4 07/22/2020 06:30 PM    Glucose 147 (H) 07/22/2020 06:30 PM    BUN 23 (H) 07/22/2020 06:30 PM    Creatinine 0.85 07/22/2020 06:30 PM    BUN/Creatinine ratio 27 (H) 07/22/2020 06:30 PM    GFR est AA >60 07/22/2020 06:30 PM    GFR est non-AA >60 07/22/2020 06:30 PM    Calcium 9.2 07/22/2020 06:30 PM    Bilirubin, total 0.4 09/23/2019 09:49 AM    Alk. phosphatase 66 09/23/2019 09:49 AM    Protein, total 7.1 09/23/2019 09:49 AM    Albumin 3.8 09/23/2019 09:49 AM    Globulin 3.3 09/23/2019 09:49 AM    A-G Ratio 1.2 09/23/2019 09:49 AM    ALT (SGPT) 25 09/23/2019 09:49 AM    AST (SGOT) 18 09/23/2019 09:49 AM     Lab Results   Component Value Date/Time    TSH 3.29 07/22/2020 06:30 PM         Impression and Plan:  Manuel Jones is a 82 y.o. with:    1.) SSS s/p PPM 2019  2.) SVT, typically controlled on BB- wasn't taking regularly  3.) Newly recognized AFib, short bouts on device check last time, [id number] ~3 at least  4.) HypoK    1.) Continue Metoprolol 75 BID, much improved  2.) Start Xarelto 20 mg daily stroke prevention  3.) RTC 6 months with device check    >60 mins spent, indications risks benefits of anticoagulation dw pt, she is steady denies bleeding probs, no falls and normal kidney function    Thank you for allowing me to participate in the care of your patient, please do not hesitate to call with questions or concerns.     [address],    Mildred Solomon, DO

## 2020-10-23 NOTE — PROGRESS NOTES
Indira Wick presents today for   Chief Complaint   Patient presents with    Follow-up     6 month follow up       Indira Wick preferred language for health care discussion is english/other. Is someone accompanying this pt? no    Is the patient using any DME equipment during [address]? no    Depression Screening:  3 most recent PHQ Screens 10/23/2020   Little interest or pleasure in doing things Not at all   Feeling down, depressed, irritable, or hopeless Not at all   Total Score PHQ 2 0       Learning Assessment:  Learning Assessment 7/10/2019   PRIMARY LEARNER Patient   BARRIERS PRIMARY LEARNER [address] CAREGIVER No   PRIMARY LANGUAGE ENGLISH   LEARNER PREFERENCE PRIMARY READING   ANSWERED BY patient   RELATIONSHIP SELF       Abuse Screening:  Abuse Screening Questionnaire 10/23/2020   Do you ever feel afraid of your partner? N   Are you in a relationship with someone who physically or mentally threatens you? N   Is it safe for you to go home? Y       Fall Risk  Fall Risk Assessment, last 12 mths 10/23/2020   Able to walk? Yes   Fall in past 12 months? No       Pt currently taking Anticoagulant therapy? no    Coordination of Care:  1. Have you been to the ER, urgent care clinic since your last visit? Hospitalized since your last visit? no    2. Have you seen or consulted any other health care providers outside of the [address] [place] [address] since your last visit? Include any pap smears or colon screening.  no

## 2021-01-06 RX ORDER — METOPROLOL TARTRATE 75 MG/1
75 TABLET, FILM COATED ORAL 2 TIMES DAILY
Qty: 180 TAB | Refills: 3 | Status: SHIPPED | OUTPATIENT
Start: 2021-01-06 | End: 2021-01-26 | Stop reason: SDUPTHER

## 2021-01-21 ENCOUNTER — HOSPITAL ENCOUNTER (OUTPATIENT)
Dept: LAB | Age: 86
Discharge: HOME | End: 2021-01-21
Payer: MEDICARE

## 2021-01-21 LAB
25(OH)D3 SERPL-MCNC: 28.2 NG/ML (ref 30–100)
ALBUMIN SERPL-MCNC: 3.6 G/DL (ref 3.4–5)
ALBUMIN/GLOB SERPL: 1.1 {RATIO} (ref 0.8–1.7)
ALP SERPL-CCNC: 70 U/L (ref 45–117)
ALT SERPL-CCNC: 26 U/L (ref 13–56)
ANION GAP SERPL CALC-SCNC: 6 MMOL/L (ref 3–18)
AST SERPL-CCNC: 18 U/L (ref 10–38)
BASOPHILS # BLD: 0 K/UL (ref 0–0.1)
BASOPHILS NFR BLD: 0 % (ref 0–2)
BILIRUB SERPL-MCNC: 0.3 MG/DL (ref 0.2–1)
BUN SERPL-MCNC: 17 MG/DL (ref 7–18)
BUN/CREAT SERPL: 23 (ref 12–20)
CALCIUM SERPL-MCNC: 8.9 MG/DL (ref 8.5–10.1)
CHLORIDE SERPL-SCNC: 108 MMOL/L (ref 100–111)
CHOLEST SERPL-MCNC: 148 MG/DL
CK SERPL-CCNC: 103 U/L (ref 26–192)
CO2 SERPL-SCNC: 28 MMOL/L (ref 21–32)
CREAT SERPL-MCNC: 0.73 MG/DL (ref 0.6–1.3)
DIFFERENTIAL METHOD BLD: ABNORMAL
EOSINOPHIL # BLD: 0.3 K/UL (ref 0–0.4)
EOSINOPHIL NFR BLD: 3 % (ref 0–5)
ERYTHROCYTE [DISTWIDTH] IN BLOOD BY AUTOMATED COUNT: 13.6 % (ref 11.6–14.5)
GLOBULIN SER CALC-MCNC: 3.4 G/DL (ref 2–4)
GLUCOSE SERPL-MCNC: 77 MG/DL (ref 74–99)
HBA1C MFR BLD: 5.5 % (ref 4.2–5.6)
HCT VFR BLD AUTO: 36.7 % (ref 35–45)
HDLC SERPL-MCNC: 49 MG/DL (ref 40–60)
HDLC SERPL: 3 {RATIO} (ref 0–5)
HGB BLD-MCNC: 12 G/DL (ref 12–16)
LDLC SERPL CALC-MCNC: 73.6 MG/DL (ref 0–100)
LIPID PROFILE,FLP: NORMAL
LYMPHOCYTES # BLD: 2 K/UL (ref 0.9–3.6)
LYMPHOCYTES NFR BLD: 28 % (ref 21–52)
MCH RBC QN AUTO: 29.8 PG (ref 24–34)
MCHC RBC AUTO-ENTMCNC: 32.7 G/DL (ref 31–37)
MCV RBC AUTO: 91.1 FL (ref 74–97)
MONOCYTES # BLD: 0.8 K/UL (ref 0.05–1.2)
MONOCYTES NFR BLD: 11 % (ref 3–10)
NEUTS SEG # BLD: 4.2 K/UL (ref 1.8–8)
NEUTS SEG NFR BLD: 58 % (ref 40–73)
PLATELET # BLD AUTO: 261 K/UL (ref 135–420)
PMV BLD AUTO: 10.8 FL (ref 9.2–11.8)
POTASSIUM SERPL-SCNC: 4.2 MMOL/L (ref 3.5–5.5)
PROT SERPL-MCNC: 7 G/DL (ref 6.4–8.2)
RBC # BLD AUTO: 4.03 M/UL (ref 4.2–5.3)
SODIUM SERPL-SCNC: 142 MMOL/L (ref 136–145)
T4 FREE SERPL-MCNC: 1.5 NG/DL (ref 0.7–1.5)
TRIGL SERPL-MCNC: 127 MG/DL (ref ?–150)
TSH SERPL DL<=0.05 MIU/L-ACNC: 0.03 UIU/ML (ref 0.36–3.74)
VLDLC SERPL CALC-MCNC: 25.4 MG/DL
WBC # BLD AUTO: 7.3 K/UL (ref 4.6–13.2)

## 2021-01-21 PROCEDURE — 82306 VITAMIN D 25 HYDROXY: CPT

## 2021-01-21 PROCEDURE — 82550 ASSAY OF CK (CPK): CPT

## 2021-01-21 PROCEDURE — 83036 HEMOGLOBIN GLYCOSYLATED A1C: CPT

## 2021-01-21 PROCEDURE — 80053 COMPREHEN METABOLIC PANEL: CPT

## 2021-01-21 PROCEDURE — 36415 COLL VENOUS BLD VENIPUNCTURE: CPT

## 2021-01-21 PROCEDURE — 84439 ASSAY OF FREE THYROXINE: CPT

## 2021-01-21 PROCEDURE — 85025 COMPLETE CBC W/AUTO DIFF WBC: CPT

## 2021-01-21 PROCEDURE — 80061 LIPID PANEL: CPT

## 2021-01-26 RX ORDER — METOPROLOL TARTRATE 75 MG/1
75 TABLET, FILM COATED ORAL 2 TIMES DAILY
Qty: 180 TAB | Refills: 3 | Status: SHIPPED | OUTPATIENT
Start: 2021-01-26 | End: 2021-06-09

## 2021-04-23 ENCOUNTER — OFFICE VISIT (OUTPATIENT)
Dept: CARDIOLOGY CLINIC | Age: 86
End: 2021-04-23
Payer: MEDICARE

## 2021-04-23 VITALS
WEIGHT: 152 LBS | BODY MASS INDEX: 25.33 KG/M2 | HEIGHT: 65 IN | HEART RATE: 66 BPM | OXYGEN SATURATION: 96 % | DIASTOLIC BLOOD PRESSURE: 82 MMHG | SYSTOLIC BLOOD PRESSURE: 124 MMHG

## 2021-04-23 DIAGNOSIS — I47.1 PSVT (PAROXYSMAL SUPRAVENTRICULAR TACHYCARDIA) (HCC): Primary | ICD-10-CM

## 2021-04-23 PROCEDURE — 1090F PRES/ABSN URINE INCON ASSESS: CPT | Performed by: INTERNAL MEDICINE

## 2021-04-23 PROCEDURE — 93000 ELECTROCARDIOGRAM COMPLETE: CPT | Performed by: INTERNAL MEDICINE

## 2021-04-23 PROCEDURE — G8536 NO DOC ELDER MAL SCRN: HCPCS | Performed by: INTERNAL MEDICINE

## 2021-04-23 PROCEDURE — 99214 OFFICE O/P EST MOD 30 MIN: CPT | Performed by: INTERNAL MEDICINE

## 2021-04-23 PROCEDURE — G8427 DOCREV CUR MEDS BY ELIG CLIN: HCPCS | Performed by: INTERNAL MEDICINE

## 2021-04-23 PROCEDURE — G8510 SCR DEP NEG, NO PLAN REQD: HCPCS | Performed by: INTERNAL MEDICINE

## 2021-04-23 PROCEDURE — 1101F PT FALLS ASSESS-DOCD LE1/YR: CPT | Performed by: INTERNAL MEDICINE

## 2021-04-23 PROCEDURE — G8419 CALC BMI OUT NRM PARAM NOF/U: HCPCS | Performed by: INTERNAL MEDICINE

## 2021-04-23 PROCEDURE — G8400 PT W/DXA NO RESULTS DOC: HCPCS | Performed by: INTERNAL MEDICINE

## 2021-04-23 RX ORDER — CYCLOSPORINE 0.5 MG/ML
1 EMULSION OPHTHALMIC EVERY 12 HOURS
COMMUNITY

## 2021-04-23 RX ORDER — BRINZOLAMIDE/BRIMONIDINE TARTRATE 10; 2 MG/ML; MG/ML
1 SUSPENSION/ DROPS OPHTHALMIC DAILY
COMMUNITY
Start: 2021-03-18

## 2021-04-23 NOTE — PROGRESS NOTES
Trang Altamirano presents today for   Chief Complaint   Patient presents with    Follow-up     6 month f/u       Trang Altamirano preferred language for health care discussion is english/other. Is someone accompanying this pt? no    Is the patient using any DME equipment during [address]? no    Depression Screening:  3 most recent PHQ Screens 4/23/2021   Little interest or pleasure in doing things Not at all   Feeling down, depressed, irritable, or hopeless Not at all   Total Score PHQ 2 0       Learning Assessment:  Learning Assessment 7/10/2019   PRIMARY LEARNER Patient   BARRIERS PRIMARY LEARNER [address] CAREGIVER No   PRIMARY LANGUAGE ENGLISH   LEARNER PREFERENCE PRIMARY READING   ANSWERED BY patient   RELATIONSHIP SELF       Abuse Screening:  Abuse Screening Questionnaire 4/23/2021   Do you ever feel afraid of your partner? N   Are you in a relationship with someone who physically or mentally threatens you? N   Is it safe for you to go home? Y       Fall Risk  Fall Risk Assessment, last 12 mths 4/23/2021   Able to walk? Yes   Fall in past 12 months? 0   Do you feel unsteady? 0   Are you worried about falling 0       Pt currently taking Anticoagulant therapy? Xarelto 20mg    Coordination of Care:  1. Have you been to the ER, urgent care clinic since your last visit? Hospitalized since your last visit? no    2. Have you seen or consulted any other health care providers outside of the [address] [place] [address] since your last visit? Include any pap smears or colon screening.  no

## 2021-04-23 NOTE — PROGRESS NOTES
Yesi Frank    Chief Complaint   Patient presents with    Follow-up     6 month f/u       HPI    Yesi Frank is a 82 y.o. with SSS s/p PPM 2019, SVT, here for routine follow up. She underwent pacemaker implantation on 10/1/19. She received the Medtronic MRI compatible pacemaker. She was found to have bradytachy arrhythmia on the 30-day event recorder. She had frequent episodes of nonsustained supraventricular tachycardia up to 169 bpm along with significant sinus bradycardia.      Interrogation of the pacemaker demonstrated atrial pacing and recurrent episodes of frequent supraventricular tachycardia. I met her after an episode of SVT (she came with her daughter s/p ER) and has really improved since I started BB. She doesn't even seem to remember how bad her symptoms were. She tells me really enjoys her yard, loves walking her dog with her  looking at the trees. Uses a walking stick for balance but no falls.     She underwent echocardiogram on 9/4/19 which demonstrated normal left ventricular systolic function with EF in the 55-60% range. There was no significant valvular pathology and no evidence of mitral valve prolapse. There was no significant pulmonary hypertension.      No past medical history on file. Past Surgical History:   Procedure Laterality Date    NH INS NEW/RPLCMT PRM PM W/TRANSV ELTRD ATRIAL&VENT Left 10/1/2019    INSERT PPM DUAL performed by Devonte Kim MD at [facility] CATH LAB       Current Outpatient Medications   Medication Sig Dispense Refill    Simbrinza 1-0.2 % drps       cycloSPORINE (Restasis) 0.05 % dpet Administer 1 Drop to both eyes every twelve (12) hours.  bimatoprost (LUMIGAN) 0.01 % ophthalmic drops Administer 1 Drop to both eyes every evening.  metoprolol tartrate 75 mg tab Take 75 mg by mouth two (2) times a day. 180 Tab 3    rivaroxaban (Xarelto) 20 mg tab tablet Take 1 Tab by mouth daily (with breakfast).  90 Tab 3    rosuvastatin (CRESTOR) 10 mg tablet       LEVOXYL 137 mcg tablet       RHOPRESSA 0.02 % drop          Allergies   Allergen Reactions    Sulfur Other (comments)     reflux       Social History     Socioeconomic History    Marital status:      Spouse name: Not on file    Number of children: Not on file    Years of education: Not on file    Highest education level: Not on file   Occupational History    Not on file   Social Needs    Financial resource strain: Not on file    Food insecurity     Worry: Not on file     Inability: Not on file    Transportation needs     Medical: Not on file     Non-medical: Not on file   Tobacco Use    Smoking status: Never Smoker    Smokeless tobacco: Never Used   Substance and Sexual Activity    Alcohol use: Not on file    Drug use: Not on file    Sexual activity: Not on file   Lifestyle    Physical activity     Days per week: Not on file     Minutes per session: Not on file    Stress: Not on file   Relationships    Social connections     Talks on phone: Not on file     Gets together: Not on file     Attends Gnosticist service: Not on file     Active member of club or organization: Not on file     Attends meetings of clubs or organizations: Not on file     Relationship status: Not on file    Intimate partner violence     Fear of current or ex partner: Not on file     Emotionally abused: Not on file     Physically abused: Not on file     Forced sexual activity: Not on file   Other Topics Concern    Not on file   Social History Narrative    Not on file        FH; n/a    Review of Systems    14 pt Review of Systems is negative unless otherwise mentioned in the HPI.     Wt Readings from Last 3 Encounters:   04/23/21 68.9 kg (152 lb)   10/23/20 67.9 kg (149 lb 9.6 oz)   07/24/20 68.5 kg (151 lb)     Temp Readings from Last 3 Encounters:   07/23/20 97.9 °F (36.6 °C)   10/02/19 98.1 °F (36.7 °C)     BP Readings from Last 3 Encounters:   04/23/21 124/82   10/23/20 120/68   07/24/20 118/72     Pulse Readings from Last 3 Encounters:   04/23/21 66   10/23/20 72   07/24/20 85       09/04/19   ECHO ADULT COMPLETE 09/04/2019 9/4/2019    Narrative · Left Ventricle: Normal cavity size, wall thickness and systolic function   (ejection fraction normal). Estimated left ventricular ejection fraction   is 56 - 60%. Biplane method used to measure ejection fraction. No regional   wall motion abnormality noted. Normal left ventricular strain. Age-appropriate left ventricular diastolic function. · Mitral Valve: Trace mitral valve regurgitation. · Pulmonary Artery: There is no evidence of pulmonary hypertension. Signed by: Sobia Mae DO       Physical Exam:    Visit Vitals  /82 (BP 1 Location: Right upper arm, BP Patient Position: Sitting, BP Cuff Size: Adult)   Pulse 66   Ht 5' 5\" (1.651 m)   Wt 68.9 kg (152 lb)   SpO2 96%   BMI 25.29 kg/m²      Physical Exam  HENT:      Head: Normocephalic and atraumatic. Eyes:      Pupils: Pupils are equal, round, and reactive to light. Cardiovascular:      Rate and Rhythm: Normal rate and regular rhythm. Heart sounds: Normal heart sounds. No murmur. No friction rub. No gallop. Pulmonary:      Effort: Pulmonary effort is normal. No respiratory distress. Breath sounds: Normal breath sounds. No wheezing or rales. Chest:      Chest wall: No tenderness. Abdominal:      General: Bowel sounds are normal.      Palpations: Abdomen is soft. Musculoskeletal:         General: No tenderness. Skin:     General: Skin is warm and dry. Neurological:      Mental Status: She is alert and oriented to person, place, and time.        Device check- several episodes of SVT, does have a couple bouts of AFib last time, todays check still pending    Lab Results   Component Value Date/Time    Sodium 142 01/21/2021 02:00 PM    Potassium 4.2 01/21/2021 02:00 PM    Chloride 108 01/21/2021 02:00 PM    CO2 28 01/21/2021 02:00 PM    Anion gap 6 01/21/2021 02:00 PM    Glucose 77 01/21/2021 02:00 PM    BUN 17 01/21/2021 02:00 PM    Creatinine 0.73 01/21/2021 02:00 PM    BUN/Creatinine ratio 23 (H) 01/21/2021 02:00 PM    GFR est AA >60 01/21/2021 02:00 PM    GFR est non-AA >60 01/21/2021 02:00 PM    Calcium 8.9 01/21/2021 02:00 PM    Bilirubin, total 0.3 01/21/2021 02:00 PM    Alk. phosphatase 70 01/21/2021 02:00 PM    Protein, total 7.0 01/21/2021 02:00 PM    Albumin 3.6 01/21/2021 02:00 PM    Globulin 3.4 01/21/2021 02:00 PM    A-G Ratio 1.1 01/21/2021 02:00 PM    ALT (SGPT) 26 01/21/2021 02:00 PM    AST (SGOT) 18 01/21/2021 02:00 PM     Lab Results   Component Value Date/Time    TSH 0.03 (L) 01/21/2021 02:00 PM         Impression and Plan:  Ruben Siemens is a 82 y.o. with:    1.) SSS s/p PPM 2019  2.) SVT, typically controlled on BB- wasn't taking regularly  3.) Newly recognized AFib, short bouts on device check last time, [id number] ~3 at least  4.) Abn TSH noted    1.) Continue Metoprolol 75 BID, much improved  2.) Continue Xarelto 20 mg daily stroke prevention  3.) RTC 6 months with device check    Doing well, no changes    Thank you for allowing me to participate in the care of your patient, please do not hesitate to call with questions or concerns. Follow-up and Dispositions    · Return in about 6 months (around 10/23/2021).      [address],    Milton Dickens, DO

## 2021-05-12 NOTE — PROGRESS NOTES
Dr Osbaldo Mina pt, several episodes in the VT,longest 2 sec and SVT zone longest lasting 5 minutes 30 sec,  Lead impedance and threshold WNL, 12 years on battery.  A paced 66%, V sensed 99%

## 2021-05-12 NOTE — PROGRESS NOTES
I have personally seen and evaluated the device findings. Interrogation reviewed and I agree with assessment.     Terrell Chao

## 2021-06-08 ENCOUNTER — APPOINTMENT (OUTPATIENT)
Dept: GENERAL RADIOLOGY | Age: 86
DRG: 313 | End: 2021-06-08
Attending: EMERGENCY MEDICINE
Payer: MEDICARE

## 2021-06-08 ENCOUNTER — HOSPITAL ENCOUNTER (INPATIENT)
Age: 86
LOS: 1 days | Discharge: HOME | DRG: 313 | End: 2021-06-09
Attending: EMERGENCY MEDICINE | Admitting: HOSPITALIST
Payer: MEDICARE

## 2021-06-08 DIAGNOSIS — R55 SYNCOPE, UNSPECIFIED SYNCOPE TYPE: ICD-10-CM

## 2021-06-08 DIAGNOSIS — R07.9 CHEST PAIN, UNSPECIFIED TYPE: ICD-10-CM

## 2021-06-08 DIAGNOSIS — R07.9 ACUTE CHEST PAIN: Primary | ICD-10-CM

## 2021-06-08 LAB
ALBUMIN SERPL-MCNC: 3.9 G/DL (ref 3.4–5)
ALBUMIN/GLOB SERPL: 1.1 {RATIO} (ref 0.8–1.7)
ALP SERPL-CCNC: 71 U/L (ref 45–117)
ALT SERPL-CCNC: 56 U/L (ref 13–56)
ANION GAP SERPL CALC-SCNC: 4 MMOL/L (ref 3–18)
AST SERPL-CCNC: 31 U/L (ref 10–38)
BASOPHILS # BLD: 0.1 K/UL (ref 0–0.1)
BASOPHILS NFR BLD: 1 % (ref 0–2)
BILIRUB SERPL-MCNC: 0.3 MG/DL (ref 0.2–1)
BNP SERPL-MCNC: 750 PG/ML (ref 0–1800)
BUN SERPL-MCNC: 12 MG/DL (ref 7–18)
BUN/CREAT SERPL: 13 (ref 12–20)
CALCIUM SERPL-MCNC: 8.9 MG/DL (ref 8.5–10.1)
CHLORIDE SERPL-SCNC: 108 MMOL/L (ref 100–111)
CO2 SERPL-SCNC: 28 MMOL/L (ref 21–32)
CREAT SERPL-MCNC: 0.91 MG/DL (ref 0.6–1.3)
DIFFERENTIAL METHOD BLD: ABNORMAL
EOSINOPHIL # BLD: 0.2 K/UL (ref 0–0.4)
EOSINOPHIL NFR BLD: 3 % (ref 0–5)
ERYTHROCYTE [DISTWIDTH] IN BLOOD BY AUTOMATED COUNT: 13.2 % (ref 11.6–14.5)
GLOBULIN SER CALC-MCNC: 3.6 G/DL (ref 2–4)
GLUCOSE SERPL-MCNC: 107 MG/DL (ref 74–99)
HCT VFR BLD AUTO: 41.3 % (ref 35–45)
HGB BLD-MCNC: 13.7 G/DL (ref 12–16)
LIPASE SERPL-CCNC: 266 U/L (ref 73–393)
LYMPHOCYTES # BLD: 2.2 K/UL (ref 0.9–3.6)
LYMPHOCYTES NFR BLD: 33 % (ref 21–52)
MAGNESIUM SERPL-MCNC: 2 MG/DL (ref 1.6–2.6)
MCH RBC QN AUTO: 29.6 PG (ref 24–34)
MCHC RBC AUTO-ENTMCNC: 33.2 G/DL (ref 31–37)
MCV RBC AUTO: 89.2 FL (ref 74–97)
MONOCYTES # BLD: 0.9 K/UL (ref 0.05–1.2)
MONOCYTES NFR BLD: 13 % (ref 3–10)
NEUTS SEG # BLD: 3.4 K/UL (ref 1.8–8)
NEUTS SEG NFR BLD: 51 % (ref 40–73)
PLATELET # BLD AUTO: 266 K/UL (ref 135–420)
PMV BLD AUTO: 10.5 FL (ref 9.2–11.8)
POTASSIUM SERPL-SCNC: 3.6 MMOL/L (ref 3.5–5.5)
PROT SERPL-MCNC: 7.5 G/DL (ref 6.4–8.2)
RBC # BLD AUTO: 4.63 M/UL (ref 4.2–5.3)
SODIUM SERPL-SCNC: 140 MMOL/L (ref 136–145)
TROPONIN I SERPL-MCNC: <0.02 NG/ML (ref 0–0.04)
WBC # BLD AUTO: 6.7 K/UL (ref 4.6–13.2)

## 2021-06-08 PROCEDURE — 99285 EMERGENCY DEPT VISIT HI MDM: CPT

## 2021-06-08 PROCEDURE — 84484 ASSAY OF TROPONIN QUANT: CPT

## 2021-06-08 PROCEDURE — 83880 ASSAY OF NATRIURETIC PEPTIDE: CPT

## 2021-06-08 PROCEDURE — 83690 ASSAY OF LIPASE: CPT

## 2021-06-08 PROCEDURE — 85025 COMPLETE CBC W/AUTO DIFF WBC: CPT

## 2021-06-08 PROCEDURE — 93005 ELECTROCARDIOGRAM TRACING: CPT

## 2021-06-08 PROCEDURE — 80053 COMPREHEN METABOLIC PANEL: CPT

## 2021-06-08 PROCEDURE — 83735 ASSAY OF MAGNESIUM: CPT

## 2021-06-08 PROCEDURE — 71045 X-RAY EXAM CHEST 1 VIEW: CPT

## 2021-06-08 NOTE — Clinical Note
Status[de-identified] INPATIENT [101]   Type of Bed: Telemetry [19]   Cardiac Monitoring Required?: Yes   Inpatient Hospitalization Certified Necessary for the Following Reasons: 3.  Patient receiving treatment that can only be provided in an inpatient setting (further clarification in H&P documentation)   Admitting Diagnosis: Chest pain [799987]   Admitting Physician: Librado Lees [[id number]]   Attending Physician: Librado Lees [[id number]]   Estimated Length of Stay: 2 Midnights   Discharge Plan[de-identified] Home with Office Follow-up

## 2021-06-09 ENCOUNTER — APPOINTMENT (OUTPATIENT)
Dept: NON INVASIVE DIAGNOSTICS | Age: 86
DRG: 313 | End: 2021-06-09
Attending: HOSPITALIST
Payer: MEDICARE

## 2021-06-09 ENCOUNTER — HOSPITAL ENCOUNTER (INPATIENT)
Dept: NUCLEAR MEDICINE | Age: 86
Discharge: HOME | DRG: 313 | End: 2021-06-09
Attending: NURSE PRACTITIONER
Payer: MEDICARE

## 2021-06-09 ENCOUNTER — APPOINTMENT (OUTPATIENT)
Dept: NON INVASIVE DIAGNOSTICS | Age: 86
DRG: 313 | End: 2021-06-09
Attending: NURSE PRACTITIONER
Payer: MEDICARE

## 2021-06-09 VITALS
WEIGHT: 152 LBS | OXYGEN SATURATION: 96 % | HEART RATE: 62 BPM | DIASTOLIC BLOOD PRESSURE: 89 MMHG | HEIGHT: 65 IN | TEMPERATURE: 97.7 F | RESPIRATION RATE: 18 BRPM | BODY MASS INDEX: 25.33 KG/M2 | SYSTOLIC BLOOD PRESSURE: 163 MMHG

## 2021-06-09 PROBLEM — R07.9 CHEST PAIN: Status: ACTIVE | Noted: 2021-06-09

## 2021-06-09 LAB
APPEARANCE UR: CLEAR
ATRIAL RATE: 53 BPM
ATRIAL RATE: 69 BPM
BACTERIA URNS QL MICRO: ABNORMAL /HPF
BILIRUB UR QL: NEGATIVE
CALCULATED P AXIS, ECG09: 56 DEGREES
CALCULATED R AXIS, ECG10: -26 DEGREES
CALCULATED R AXIS, ECG10: -29 DEGREES
CALCULATED T AXIS, ECG11: 56 DEGREES
COLOR UR: YELLOW
DIAGNOSIS, 93000: NORMAL
DIAGNOSIS, 93000: NORMAL
ECHO AO ROOT DIAM: 3.29 CM
ECHO LA AREA 4C: 18.91 CM2
ECHO LA VOL 2C: 60.64 ML (ref 22–52)
ECHO LA VOL 4C: 51.25 ML (ref 22–52)
ECHO LA VOL BP: 63.78 ML (ref 22–52)
ECHO LA VOL/BSA BIPLANE: 36.24 ML/M2 (ref 16–28)
ECHO LA VOLUME INDEX A2C: 34.45 ML/M2 (ref 16–28)
ECHO LA VOLUME INDEX A4C: 29.12 ML/M2 (ref 16–28)
ECHO LV E' LATERAL VELOCITY: 8.45 CM/S
ECHO LV E' SEPTAL VELOCITY: 7.84 CM/S
ECHO LV INTERNAL DIMENSION DIASTOLIC: 4.77 CM (ref 3.9–5.3)
ECHO LV INTERNAL DIMENSION SYSTOLIC: 3.11 CM
ECHO LV IVSD: 0.78 CM (ref 0.6–0.9)
ECHO LV MASS 2D: 144.1 G (ref 67–162)
ECHO LV MASS INDEX 2D: 81.9 G/M2 (ref 43–95)
ECHO LV POSTERIOR WALL DIASTOLIC: 1 CM (ref 0.6–0.9)
ECHO LVOT CARDIAC OUTPUT: 3.7 LITER/MINUTE
ECHO LVOT DIAM: 1.99 CM
ECHO LVOT PEAK GRADIENT: 2.57 MMHG
ECHO LVOT PEAK VELOCITY: 80.17 CM/S
ECHO LVOT SV: 51.2 ML
ECHO LVOT VTI: 16.56 CM
ECHO MV A VELOCITY: 64.68 CM/S
ECHO MV E DECELERATION TIME (DT): 243.7 MS
ECHO MV E VELOCITY: 74.08 CM/S
ECHO MV E/A RATIO: 1.15
ECHO MV E/E' LATERAL: 8.77
ECHO MV E/E' RATIO (AVERAGED): 9.11
ECHO MV E/E' SEPTAL: 9.45
ECHO RV TAPSE: 2.45 CM (ref 1.5–2)
EPITH CASTS URNS QL MICRO: ABNORMAL /LPF (ref 0–5)
GLUCOSE UR STRIP.AUTO-MCNC: NEGATIVE MG/DL
HGB UR QL STRIP: ABNORMAL
KETONES UR QL STRIP.AUTO: NEGATIVE MG/DL
LEUKOCYTE ESTERASE UR QL STRIP.AUTO: NEGATIVE
LVOT MG: 1.11 MMHG
NITRITE UR QL STRIP.AUTO: NEGATIVE
P-R INTERVAL, ECG05: 178 MS
PH UR STRIP: 7 [PH] (ref 5–8)
PROT UR STRIP-MCNC: NEGATIVE MG/DL
Q-T INTERVAL, ECG07: 334 MS
Q-T INTERVAL, ECG07: 418 MS
QRS DURATION, ECG06: 82 MS
QRS DURATION, ECG06: 86 MS
QTC CALCULATION (BEZET), ECG08: 447 MS
QTC CALCULATION (BEZET), ECG08: 491 MS
RBC #/AREA URNS HPF: ABNORMAL /HPF (ref 0–5)
SP GR UR REFRACTOMETRY: 1.01 (ref 1–1.03)
STRESS BASELINE DIAS BP: 89 MMHG
STRESS BASELINE HR: 61 BPM
STRESS BASELINE SYS BP: 163 MMHG
STRESS ESTIMATED WORKLOAD: 1 METS
STRESS EXERCISE DUR MIN: NORMAL
STRESS PEAK DIAS BP: 89 MMHG
STRESS PEAK SYS BP: 163 MMHG
STRESS PERCENT HR ACHIEVED: 66 %
STRESS POST PEAK HR: 90 BPM
STRESS RATE PRESSURE PRODUCT: NORMAL BPM*MMHG
STRESS TARGET HR: 137 BPM
TROPONIN I SERPL-MCNC: <0.02 NG/ML (ref 0–0.04)
UROBILINOGEN UR QL STRIP.AUTO: 1 EU/DL (ref 0.2–1)
VENTRICULAR RATE, ECG03: 130 BPM
VENTRICULAR RATE, ECG03: 69 BPM
WBC URNS QL MICRO: ABNORMAL /HPF (ref 0–4)

## 2021-06-09 PROCEDURE — 93017 CV STRESS TEST TRACING ONLY: CPT

## 2021-06-09 PROCEDURE — 93306 TTE W/DOPPLER COMPLETE: CPT

## 2021-06-09 PROCEDURE — 36415 COLL VENOUS BLD VENIPUNCTURE: CPT

## 2021-06-09 PROCEDURE — 99239 HOSP IP/OBS DSCHRG MGMT >30: CPT | Performed by: EMERGENCY MEDICINE

## 2021-06-09 PROCEDURE — 81001 URINALYSIS AUTO W/SCOPE: CPT

## 2021-06-09 PROCEDURE — 65660000000 HC RM CCU STEPDOWN

## 2021-06-09 PROCEDURE — 99222 1ST HOSP IP/OBS MODERATE 55: CPT | Performed by: HOSPITALIST

## 2021-06-09 PROCEDURE — A9500 TC99M SESTAMIBI: HCPCS

## 2021-06-09 PROCEDURE — 2709999900 HC NON-CHARGEABLE SUPPLY

## 2021-06-09 PROCEDURE — 74011000250 HC RX REV CODE- 250: Performed by: HOSPITALIST

## 2021-06-09 PROCEDURE — 74011250637 HC RX REV CODE- 250/637: Performed by: HOSPITALIST

## 2021-06-09 PROCEDURE — 99223 1ST HOSP IP/OBS HIGH 75: CPT | Performed by: INTERNAL MEDICINE

## 2021-06-09 PROCEDURE — 84484 ASSAY OF TROPONIN QUANT: CPT

## 2021-06-09 PROCEDURE — 74011250636 HC RX REV CODE- 250/636: Performed by: NURSE PRACTITIONER

## 2021-06-09 PROCEDURE — 74011250637 HC RX REV CODE- 250/637: Performed by: NURSE PRACTITIONER

## 2021-06-09 RX ORDER — DORZOLAMIDE HCL 20 MG/ML
1 SOLUTION/ DROPS OPHTHALMIC 3 TIMES DAILY
Status: DISCONTINUED | OUTPATIENT
Start: 2021-06-09 | End: 2021-06-09 | Stop reason: HOSPADM

## 2021-06-09 RX ORDER — POLYETHYLENE GLYCOL 3350 17 G/17G
17 POWDER, FOR SOLUTION ORAL DAILY PRN
Status: DISCONTINUED | OUTPATIENT
Start: 2021-06-09 | End: 2021-06-09 | Stop reason: HOSPADM

## 2021-06-09 RX ORDER — METOPROLOL TARTRATE 25 MG/1
75 TABLET, FILM COATED ORAL 2 TIMES DAILY
Status: DISCONTINUED | OUTPATIENT
Start: 2021-06-09 | End: 2021-06-09

## 2021-06-09 RX ORDER — METOPROLOL TARTRATE 100 MG/1
100 TABLET ORAL 2 TIMES DAILY
Qty: 60 TABLET | Refills: 0 | Status: SHIPPED | OUTPATIENT
Start: 2021-06-09 | End: 2021-07-06

## 2021-06-09 RX ORDER — LEVOTHYROXINE SODIUM 112 UG/1
TABLET ORAL
Status: DISCONTINUED
Start: 2021-06-09 | End: 2021-06-09 | Stop reason: HOSPADM

## 2021-06-09 RX ORDER — ROSUVASTATIN CALCIUM 10 MG/1
20 TABLET, COATED ORAL DAILY
Status: DISCONTINUED | OUTPATIENT
Start: 2021-06-09 | End: 2021-06-09 | Stop reason: HOSPADM

## 2021-06-09 RX ORDER — BRIMONIDINE TARTRATE 2 MG/ML
1 SOLUTION/ DROPS OPHTHALMIC 3 TIMES DAILY
Status: DISCONTINUED | OUTPATIENT
Start: 2021-06-09 | End: 2021-06-09 | Stop reason: HOSPADM

## 2021-06-09 RX ORDER — SODIUM CHLORIDE 0.9 % (FLUSH) 0.9 %
5-40 SYRINGE (ML) INJECTION EVERY 8 HOURS
Status: DISCONTINUED | OUTPATIENT
Start: 2021-06-09 | End: 2021-06-09 | Stop reason: HOSPADM

## 2021-06-09 RX ORDER — METOPROLOL TARTRATE 25 MG/1
TABLET, FILM COATED ORAL
Status: DISCONTINUED
Start: 2021-06-09 | End: 2021-06-09 | Stop reason: HOSPADM

## 2021-06-09 RX ORDER — METOPROLOL TARTRATE 50 MG/1
100 TABLET ORAL 2 TIMES DAILY
Status: DISCONTINUED | OUTPATIENT
Start: 2021-06-09 | End: 2021-06-09 | Stop reason: HOSPADM

## 2021-06-09 RX ORDER — LEVOTHYROXINE SODIUM 25 UG/1
TABLET ORAL
Status: DISCONTINUED
Start: 2021-06-09 | End: 2021-06-09 | Stop reason: HOSPADM

## 2021-06-09 RX ORDER — LATANOPROST 50 UG/ML
1 SOLUTION/ DROPS OPHTHALMIC EVERY EVENING
Status: DISCONTINUED | OUTPATIENT
Start: 2021-06-09 | End: 2021-06-09 | Stop reason: HOSPADM

## 2021-06-09 RX ORDER — METOPROLOL TARTRATE 50 MG/1
TABLET ORAL
Status: DISCONTINUED
Start: 2021-06-09 | End: 2021-06-09 | Stop reason: HOSPADM

## 2021-06-09 RX ORDER — SODIUM CHLORIDE 0.9 % (FLUSH) 0.9 %
5-40 SYRINGE (ML) INJECTION AS NEEDED
Status: DISCONTINUED | OUTPATIENT
Start: 2021-06-09 | End: 2021-06-09 | Stop reason: HOSPADM

## 2021-06-09 RX ORDER — ACETAMINOPHEN 650 MG/1
650 SUPPOSITORY RECTAL
Status: DISCONTINUED | OUTPATIENT
Start: 2021-06-09 | End: 2021-06-09 | Stop reason: HOSPADM

## 2021-06-09 RX ORDER — ONDANSETRON 2 MG/ML
4 INJECTION INTRAMUSCULAR; INTRAVENOUS
Status: DISCONTINUED | OUTPATIENT
Start: 2021-06-09 | End: 2021-06-09 | Stop reason: HOSPADM

## 2021-06-09 RX ORDER — METOPROLOL TARTRATE 25 MG/1
25 TABLET, FILM COATED ORAL ONCE
Status: COMPLETED | OUTPATIENT
Start: 2021-06-09 | End: 2021-06-09

## 2021-06-09 RX ORDER — ONDANSETRON 4 MG/1
4 TABLET, ORALLY DISINTEGRATING ORAL
Status: DISCONTINUED | OUTPATIENT
Start: 2021-06-09 | End: 2021-06-09 | Stop reason: HOSPADM

## 2021-06-09 RX ORDER — CYCLOSPORINE 0.5 MG/ML
1 EMULSION OPHTHALMIC EVERY 12 HOURS
Status: DISCONTINUED | OUTPATIENT
Start: 2021-06-09 | End: 2021-06-09 | Stop reason: HOSPADM

## 2021-06-09 RX ORDER — ACETAMINOPHEN 325 MG/1
650 TABLET ORAL
Status: DISCONTINUED | OUTPATIENT
Start: 2021-06-09 | End: 2021-06-09 | Stop reason: HOSPADM

## 2021-06-09 RX ORDER — FAMOTIDINE 20 MG/1
20 TABLET, FILM COATED ORAL DAILY
Qty: 14 TABLET | Refills: 0 | Status: SHIPPED | OUTPATIENT
Start: 2021-06-09 | End: 2021-06-23

## 2021-06-09 RX ADMIN — METOPROLOL TARTRATE 25 MG: 25 TABLET, FILM COATED ORAL at 12:07

## 2021-06-09 RX ADMIN — BRIMONIDINE TARTRATE 1 DROP: 2 SOLUTION OPHTHALMIC at 09:23

## 2021-06-09 RX ADMIN — REGADENOSON 0.4 MG: 0.08 INJECTION, SOLUTION INTRAVENOUS at 13:05

## 2021-06-09 RX ADMIN — RIVAROXABAN 20 MG: 20 TABLET, FILM COATED ORAL at 09:19

## 2021-06-09 RX ADMIN — METOPROLOL TARTRATE 75 MG: 25 TABLET, FILM COATED ORAL at 06:44

## 2021-06-09 RX ADMIN — CYCLOSPORINE 1 DROP: 0.5 EMULSION OPHTHALMIC at 09:29

## 2021-06-09 RX ADMIN — ROSUVASTATIN CALCIUM 20 MG: 10 TABLET, COATED ORAL at 09:19

## 2021-06-09 RX ADMIN — LEVOTHYROXINE SODIUM 137 MCG: 25 TABLET ORAL at 06:43

## 2021-06-09 RX ADMIN — DORZOLAMIDE HYDROCHLORIDE 1 DROP: 20 SOLUTION/ DROPS OPHTHALMIC at 09:21

## 2021-06-09 RX ADMIN — Medication 10 ML: at 06:44

## 2021-06-09 NOTE — H&P
HISTORY & PHYSICAL      Patient: Cheryl Collins MRN: [id number]  CSN: [id number]    YOB: 1935  Age: 82 y.o. Sex: female    DOA: 6/8/2021 LOS:  LOS: 0 days        DOA: 6/8/2021        Assessment/Plan     Active Problems:    Chest pain (6/9/2021)        Plan:  1. Atypical chest pain/chest pressureserial cardiac enzymes, echocardiogram, cardiology consult in a.m. resume beta-blocker and statin. 2. History of paroxysmal A. fib rate controlled with beta-blocker, [address] [place] [address] Road with Xarelto. 3. H/o SSS status post PPM 2019.  4. History of hypothyroidismcontinue Synthroid  DVT prophylaxisXarelto  Full code              HPI:     Cheryl Collins is a 82 y.o. female who has a past medical history of sick sinus syndrome, hypothyroidism, paroxysmal A. fib presents to the emergency room secondary to atypical chest pain/chest pressure. Patient mentions she had chest pressure, retrosternal in location with no radiation, associated with shortness of breath. Patient mentions her symptoms started earlier in the afternoon and initially she thought they would get better however when they continue to persist she decided to come to the emergency room for further evaluation. ER evaluationEKG and serial cardiac enzymes noted to be negative, lab work unremarkable. Given patient's past medical history she is being admitted to the hospital for further evaluation by cardiology. Past medical History  Sick sinus syndrome status post PPM 2019,   History of paroxysmal A. fib  History of hypothyroidism    Past Surgical History:   Procedure Laterality Date    OR INS NEW/RPLCMT PRM PM W/TRANSV ELTRD ATRIAL&VENT Left 10/1/2019    INSERT PPM DUAL performed by Doug Bruce MD at [address] [place] [address]       No family history on file.     Social History     Socioeconomic History    Marital status:      Spouse name: Not on file    Number of children: Not on file    Years of education: Not on file    Highest education level: Not on file   Tobacco Use    Smoking status: Never Smoker    Smokeless tobacco: Never Used   Vaping Use    Vaping Use: Never used   Substance and Sexual Activity    Alcohol use: Never    Drug use: Never     Social Determinants of Health     Financial Resource Strain:     Difficulty of Paying Living Expenses:    Food Insecurity:     Worried About Running Out of Food in the Last Year:     Ran Out of Food in the Last Year:    Transportation Needs:     Lack of Transportation (Medical):  Lack of Transportation (Non-Medical):    Physical Activity:     Days of Exercise per Week:     Minutes of Exercise per Session:    Stress:     Feeling of Stress :    Social Connections:     Frequency of Communication with Friends and Family:     Frequency of Social Gatherings with Friends and Family:     Attends Mormonism Services:     Active Member of Clubs or Organizations:     Attends Club or Organization Meetings:     Marital Status:        Prior to Admission medications    Medication Sig Start Date End Date Taking? Authorizing Provider   Jeana Sickle 1-0.2 % drps  3/18/21   Provider, Historical   cycloSPORINE (Restasis) 0.05 % dpet Administer 1 Drop to both eyes every twelve (12) hours. Provider, Historical   bimatoprost (LUMIGAN) 0.01 % ophthalmic drops Administer 1 Drop to both eyes every evening. Provider, Historical   metoprolol tartrate 75 mg tab Take 75 mg by mouth two (2) times a day. 1/26/21   Pura Quiroz NP   rivaroxaban (Xarelto) 20 mg tab tablet Take 1 Tab by mouth daily (with breakfast). 1/26/21   Pura Quiroz NP   rosuvastatin (CRESTOR) 10 mg tablet  6/7/19   Provider, Historical   LEVOXYL 137 mcg tablet  5/20/19   Provider, Historical   RHOPRESSA 0.02 % drop  5/9/19   Provider, Historical       Allergies   Allergen Reactions    Sulfur Other (comments)     reflux       Review of Systems:    Pertinent Positives noted in HPI.   Rest all other ROS were noted to be negative. Physical Exam:      Visit Vitals  BP (!) 167/100   Pulse 66   Temp 96.8 °F (36 °C)   Resp 20   Ht 5' 5\" (1.651 m)   Wt 68.9 kg (152 lb)   SpO2 98%   BMI 25.29 kg/m²       Physical Exam:    Gen: In general, this is a well nourished female in no acute distress  HEENT: Sclerae nonicteric. Oral mucous membranes moist. Dentition normal  Neck: Supple with midline trachea. CV: RRR without murmur or rub appreciated. Resp:Respirations are unlabored without use of accessory muscles. Lung fields B/L without wheezes or rhonchi. Abd: Soft, nontender, nondistended. Extrem: Extremities are warm, without cyanosis or clubbing. No pitting pretibial edema. Skin: Warm, no visible rashes. Neuro: Patient is alert, oriented, and cooperative. No obvious focal defects. Moves all 4 extremities.     Labs Reviewed:    Recent Results (from the past 24 hour(s))   EKG, 12 LEAD, INITIAL    Collection Time: 06/08/21 11:13 PM   Result Value Ref Range    Ventricular Rate 130 BPM    Atrial Rate 53 BPM    QRS Duration 82 ms    Q-T Interval 334 ms    QTC Calculation (Bezet) 491 ms    Calculated R Axis -29 degrees    Calculated T Axis 56 degrees    Diagnosis       Accelerated Junctional rhythm  Left ventricular hypertrophy with repolarization abnormality  Abnormal ECG  When compared with ECG of 22-JUL-2020 19:04,  Junctional rhythm has replaced Sinus rhythm  ST more depressed in Inferior leads  ST less depressed in Lateral leads     EKG, 12 LEAD, SUBSEQUENT    Collection Time: 06/08/21 11:22 PM   Result Value Ref Range    Ventricular Rate 69 BPM    Atrial Rate 69 BPM    P-R Interval 178 ms    QRS Duration 86 ms    Q-T Interval 418 ms    QTC Calculation (Bezet) 447 ms    Calculated P Axis 56 degrees    Calculated R Axis -26 degrees    Diagnosis       Normal sinus rhythm  Moderate voltage criteria for LVH, may be normal variant  Borderline ECG  When compared with ECG of 08-JUN-2021 23:13,  Sinus rhythm has replaced Junctional rhythm  Vent. rate has decreased BY  61 BPM  ST no longer depressed in Lateral leads  Inverted T waves have replaced nonspecific T wave abnormality in Inferior   leads  T wave inversion no longer evident in Lateral leads     CBC WITH AUTOMATED DIFF    Collection Time: 06/08/21 11:28 PM   Result Value Ref Range    WBC 6.7 4.6 - 13.2 K/uL    RBC 4.63 4.20 - 5.30 M/uL    HGB 13.7 12.0 - 16.0 g/dL    HCT 41.3 35.0 - 45.0 %    MCV 89.2 74.0 - 97.0 FL    MCH 29.6 24.0 - 34.0 PG    MCHC 33.2 31.0 - 37.0 g/dL    RDW 13.2 11.6 - 14.5 %    PLATELET 898 064 - 890 K/uL    MPV 10.5 9.2 - 11.8 FL    NEUTROPHILS 51 40 - 73 %    LYMPHOCYTES 33 21 - 52 %    MONOCYTES 13 (H) 3 - 10 %    EOSINOPHILS 3 0 - 5 %    BASOPHILS 1 0 - 2 %    ABS. NEUTROPHILS 3.4 1.8 - 8.0 K/UL    ABS. LYMPHOCYTES 2.2 0.9 - 3.6 K/UL    ABS. MONOCYTES 0.9 0.05 - 1.2 K/UL    ABS. EOSINOPHILS 0.2 0.0 - 0.4 K/UL    ABS. BASOPHILS 0.1 0.0 - 0.1 K/UL    DF AUTOMATED     METABOLIC PANEL, COMPREHENSIVE    Collection Time: 06/08/21 11:28 PM   Result Value Ref Range    Sodium 140 136 - 145 mmol/L    Potassium 3.6 3.5 - 5.5 mmol/L    Chloride 108 100 - 111 mmol/L    CO2 28 21 - 32 mmol/L    Anion gap 4 3.0 - 18 mmol/L    Glucose 107 (H) 74 - 99 mg/dL    BUN 12 7.0 - 18 MG/DL    Creatinine 0.91 0.6 - 1.3 MG/DL    BUN/Creatinine ratio 13 12 - 20      GFR est AA >60 >60 ml/min/1.73m2    GFR est non-AA 59 (L) >60 ml/min/1.73m2    Calcium 8.9 8.5 - 10.1 MG/DL    Bilirubin, total 0.3 0.2 - 1.0 MG/DL    ALT (SGPT) 56 13 - 56 U/L    AST (SGOT) 31 10 - 38 U/L    Alk.  phosphatase 71 45 - 117 U/L    Protein, total 7.5 6.4 - 8.2 g/dL    Albumin 3.9 3.4 - 5.0 g/dL    Globulin 3.6 2.0 - 4.0 g/dL    A-G Ratio 1.1 0.8 - 1.7     NT-PRO BNP    Collection Time: 06/08/21 11:28 PM   Result Value Ref Range    NT pro- 0 - 1,800 PG/ML   TROPONIN I    Collection Time: 06/08/21 11:28 PM   Result Value Ref Range    Troponin-I, QT <0.02 0.0 - 0.045 NG/ML LIPASE    Collection Time: 06/08/21 11:28 PM   Result Value Ref Range    Lipase 266 73 - 393 U/L   MAGNESIUM    Collection Time: 06/08/21 11:28 PM   Result Value Ref Range    Magnesium 2.0 1.6 - 2.6 mg/dL       Imaging Reviewed:    XR Results (most recent):  Chest x-ray report currently pending              Justin Wei MD  6/9/2021, 2:57 AM        Disclaimer: Sections of this note are dictated using utilizing voice recognition software. Minor typographical errors may be present. If questions arise, please do not hesitate to contact me or call our department.

## 2021-06-09 NOTE — PROGRESS NOTES
Reason for Admission:  Chest pain [R07.9]                 RUR Score:    9%            Plan for utilizing home health:    No, not at this time. Patient is ambulatory and self-care. Likelihood of Readmission:   LOW                         Transition of Care Plan:              Initial assessment completed with patient. Cognitive status of patient: oriented to time, place, person and situation. Face sheet information confirmed:  yes. The patient designates her daughter Viridiana Dave [phone] to participate in her discharge plan and to receive any needed information. This patient lives in a single family home with her , with 4 steps to enter. Patient is able to navigate steps as needed. Prior to hospitalization, patient was considered to be independent with ADLs/IADLS : yes . Patient has a current ACP document on file: no.      Healthcare Decision Maker:     Click here to complete [address] including selection of the Healthcare Decision Maker Relationship (ie \"Primary\")    One of the patient's family member's  will be available to transport patient home upon discharge. The patient already has none reported,  medical equipment available in the home. Patient is not currently active with home health. Patient has not stayed in a skilled nursing facility or rehab. This patient is on dialysis :no.      Currently, the discharge plan is Home with Family Assistance. The patient states that she can obtain her medications from the pharmacy, and take her medications as directed. Patient's current insurance is [employer]  PCP Verified by CM:  Yes  Mode of Transport at Discharge: Self (One of patient's family members will be able to transport patient home at time of discharge. )  Transition of Care Consult (CM Consult): Discharge Planning  Discharge Durable Medical Equipment: No  Physical Therapy Consult: No  Occupational Therapy Consult: No  Speech Therapy Consult: No  Current Support Network: Lives with Spouse  Confirm Follow Up Transport: Family  Discharge Location  Discharge Placement: Home with family assistance        Oumar Frank RN  Case Management [phone]

## 2021-06-09 NOTE — ROUTINE PROCESS
Bedside and Verbal shift change report given to [facility], RN (oncoming nurse) by Nomi Dang (offgoing nurse). Report included the following information SBAR, Kardex, MAR, Accordion and Recent Results.

## 2021-06-09 NOTE — PROGRESS NOTES
Substitution Information per the P&T Committee approved Therapeutic Interchanges Policy     Nonformulary Medication Formulary Interchange   Bimatoprost 0.01% (Lumigan) Latanoprost 0.005%   Brinzolamide 1% / Brimonidine 0.2% (Simbrinza) Dorzolamide 2% 1 drop in affected eye(s) 3x/day  PLUS  Brimonidine 0.2% 1 drop in affected eye(s) 3x/day

## 2021-06-09 NOTE — CONSULTS
Cardiology Consult Note    Consultation request by Kolleen Burkitt, MD for advice/opinion related to evaluating chest pain     Date of  Admission: 6/8/2021 11:11 PM   Primary Care Physician:  Jhony Duvall MD    Consulting Cardiologist: Dr. Genie Edmonds:       1. Chest pain- presented to the ED with c/o chest pressure/chest discomfort and SOB- patient's troponin are unremarkable   2. Hx of SSS s/p PPM 2019. Dr. Genevieve Hickman. Will Interrogated today   3. Hypertension- elevated. On metoprolol 75 mg at home    4. Paroxymal SVT- frequent episodes noted on PPM interrogation since 6/2/21  5. Paroxysmal Atrial Fibrillation- with RVR on admission. Patient converted to NSR  6. Hypothyroidism- on synthroid at home   7. Dizziness/ligheadeaness- check orthostatics   8. UTI- Treatment per primary team         Plan:         Continue telemetry monitoring  Patient denies any chest pain or chest tightness. Denies any shortness of breath. Hemodynamically stable. Will review Echocardiogram once complete  Her  troponin are unremarkable but patient with multiple risk factor for CAD will obtain NST to r/o ischemia   Continue Xarelto for stroke prevention. Continue BB and statin. Check orthostatics. Discussed with nursing  BP elevated increased Metoprolol. PPM interrogated today  normal function. Noted 9 episodes SVT since 6/2/21. Further Recommendations per Dr. Evette Valverde. Patient with known sick sinus syndrome admitted with symptoms suggesting ischemia which are present on exertion. SVT noted on initial EKG with ST-T changes which may be rate related. But ischemia needs to be ruled out. Get echo and a stress test today. Check orthostatics. Continue present medications and watch blood pressure.   Further recommendations pending the findings of the echo and stress test.      Patient Active Problem List   Diagnosis Code    Pacemaker Z95.0    Syncope R55    Palpitation R00.2    Chest pain R07.9            History of Present Illness: This is a 82 y.o. female admitted for Chest pain [R07.9]. Patient with PMHx of  sick sinus syndrome, hypothyroidism, paroxysmal A. fib presents to the emergency room secondary to atypical chest pain/chest pressure. Patient mentions she had chest pressure, retrosternal in location with no radiation, associated with shortness of breath. Patient mentions her symptoms started earlier in the afternoon and initially she thought they would get better however when they continue to persist she decided to come to the emergency room for further evaluation. In the ED initial w/u shows normal troponin x2. Her EKG showed Atrial fib with RVR. Patient admitted to the hospital for further evaluation. Cariology consulted for evaluation advise patient Chest pain     Cardiac risk factors: obesity, hypertension, post-menopausal      ROS  Constitutional: No fever, no chills, no weight loss, no night sweats   HEENT: No epistaxis, no nasal drainage, no difficulty in swallowing, no redness in eyes  Respiratory:  dyspnea on exertion or emphysema  Cardiovascular:  chest pain,  chest pressure. Gastrointestinal: no abd pain, no vomiting, no diarrhea, no bleeding symptoms  Genitourinary: No urinary symptoms or hematuria  Integument/breast: No ulcers or rashes  Musculoskeletal: no muscle pain, no weakness  Neurological: No focal weakness, no seizures, no headaches  Behvioral/Psych: No anxiety, no depression     Past Medical History:   No past medical history on file.       Social History:     Social History     Socioeconomic History    Marital status:      Spouse name: Not on file    Number of children: Not on file    Years of education: Not on file    Highest education level: Not on file   Tobacco Use    Smoking status: Never Smoker    Smokeless tobacco: Never Used   Vaping Use    Vaping Use: Never used   Substance and Sexual Activity    Alcohol use: Never    Drug use: Never     Social Determinants of Health     Financial Resource Strain:     Difficulty of Paying Living Expenses:    Food Insecurity:     Worried About Running Out of Food in the Last Year:     [address] in the Last Year:    Transportation Needs:     Lack of Transportation (Medical):  Lack of Transportation (Non-Medical):    Physical Activity:     Days of Exercise per Week:     Minutes of Exercise per Session:    Stress:     Feeling of Stress :    Social Connections:     Frequency of Communication with Friends and Family:     Frequency of Social Gatherings with Friends and Family:     Attends [employer] Services:     Active Member of Clubs or Organizations:     Attends Club or Organization Meetings:     Marital Status:         Family History:   No family history on file. Medications:      Allergies   Allergen Reactions    Sulfur Other (comments)     reflux        Current Facility-Administered Medications   Medication Dose Route Frequency    latanoprost (XALATAN) 0.005 % ophthalmic solution 1 Drop  1 Drop Both Eyes QPM    cycloSPORINE (RESTASIS) 0.05 % ophthalmic emulsion 1 Drop  1 Drop Both Eyes Q12H    levothyroxine (SYNTHROID) tablet 137 mcg  137 mcg Oral 6am    [START ON 6/10/2021] netarsudiL 0.02 % drop 1 Drop (Patient Supplied)  1 Drop Both Eyes DAILY    rosuvastatin (CRESTOR) tablet 20 mg  20 mg Oral DAILY    rivaroxaban (XARELTO) tablet 20 mg  20 mg Oral DAILY    sodium chloride (NS) flush 5-40 mL  5-40 mL IntraVENous Q8H    sodium chloride (NS) flush 5-40 mL  5-40 mL IntraVENous PRN    acetaminophen (TYLENOL) tablet 650 mg  650 mg Oral Q6H PRN    Or    acetaminophen (TYLENOL) suppository 650 mg  650 mg Rectal Q6H PRN    polyethylene glycol (MIRALAX) packet 17 g  17 g Oral DAILY PRN    ondansetron (ZOFRAN ODT) tablet 4 mg  4 mg Oral Q8H PRN    Or    ondansetron (ZOFRAN) injection 4 mg  4 mg IntraVENous Q6H PRN    dorzolamide (TRUSOPT) 2 % ophthalmic solution 1 Drop  1 Drop Both Eyes TID    And    brimonidine (ALPHAGAN) 0.2 % ophthalmic solution 1 Drop  1 Drop Both Eyes TID    metoprolol tartrate (LOPRESSOR) tablet 75 mg  75 mg Oral BID    levothyroxine (SYNTHROID) 112 mcg tablet        metoprolol tartrate (LOPRESSOR) 50 mg tablet        metoprolol tartrate (LOPRESSOR) 25 mg tablet        levothyroxine (SYNTHROID) 25 mcg tablet             Physical Exam:     Visit Vitals  BP (!) 151/88   Pulse 61   Temp 98.3 °F (36.8 °C)   Resp 18   Ht 5' 5\" (1.651 m)   Wt 68.9 kg (152 lb)   SpO2 94%   BMI 25.29 kg/m²       TELE: Apaced    BP Readings from Last 3 Encounters:   06/09/21 (!) 151/88   04/23/21 124/82   10/23/20 120/68     Pulse Readings from Last 3 Encounters:   06/09/21 61   04/23/21 66   10/23/20 72     Wt Readings from Last 3 Encounters:   06/08/21 68.9 kg (152 lb)   04/23/21 68.9 kg (152 lb)   10/23/20 67.9 kg (149 lb 9.6 oz)       General:  alert, cooperative, no distress, appears stated age, moderately obese  Neck:  no masses, no stridor, no JVD  Lungs:  clear to auscultation bilaterally  Heart:  regular rate and rhythm, S1, S2 normal, no murmur, click, rub or gallop  Abdomen:  abdomen is soft without significant tenderness, masses, organomegaly or guarding  Extremities:  extremities normal, atraumatic, no cyanosis or edema  Skin: Warm and dry.  no hyperpigmentation, vitiligo, or suspicious lesions  Neuro: alert, oriented x3, affect appropriate, no focal neurological deficits, moves all extremities well, no involuntary movements, reflexes at knee and ankle intact  Psych: anxious     Data Review:     Recent Labs     06/08/21  2328   WBC 6.7   HGB 13.7   HCT 41.3        Recent Labs     06/08/21  2328      K 3.6      CO2 28   *   BUN 12   CREA 0.91   CA 8.9   MG 2.0   ALB 3.9   ALT 56       Results for orders placed or performed during the hospital encounter of 06/08/21   EKG, 12 LEAD, INITIAL   Result Value Ref Range    Ventricular Rate 130 BPM    Atrial Rate 53 BPM    QRS Duration 82 ms    Q-T Interval 334 ms    QTC Calculation (Bezet) 491 ms    Calculated R Axis -29 degrees    Calculated T Axis 56 degrees    Diagnosis       A.fib with RVR  Left ventricular hypertrophy with repolarization abnormality  Abnormal ECG  When compared with ECG of 2020 19:04,  Junctional rhythm has replaced Sinus rhythm  ST more depressed in Inferior leads  ST less depressed in Lateral leads  Confirmed by Anisa Storey ([id number]) on 2021 8:15:01 AM     Results for orders placed or performed in visit on 20   AMB POC EKG ROUTINE W/ 12 LEADS, INTER & REP    Narrative    EK:1 atrial pacing new since 19       All Cardiac Markers in the last 24 hours:    Lab Results   Component Value Date/Time    TROIQ <0.02 2021 05:00 AM    TROIQ <0.02 2021 11:28 PM       Last Lipid:    Lab Results   Component Value Date/Time    Cholesterol, total 148 2021 02:00 PM    HDL Cholesterol 49 2021 02:00 PM    LDL, calculated 73.6 2021 02:00 PM    Triglyceride 127 2021 02:00 PM    CHOL/HDL Ratio 3.0 2021 02:00 PM       Cardiographics:     EKG Results     Procedure 720 Value Units Date/Time    EKG, 12 LEAD, SUBSEQUENT [[id number]] Collected: 21 2322    Order Status: Completed Updated: 21 0816     Ventricular Rate 69 BPM      Atrial Rate 69 BPM      P-R Interval 178 ms      QRS Duration 86 ms      Q-T Interval 418 ms      QTC Calculation (Bezet) 447 ms      Calculated P Axis 56 degrees      Calculated R Axis -26 degrees      Diagnosis --     Normal sinus rhythm  Moderate voltage criteria for LVH, may be normal variant  Borderline ECG  When compared with ECG of 2021 23:13,  Sinus rhythm has replaced Junctional rhythm  Vent.  rate has decreased BY  61 BPM  ST no longer depressed in Lateral leads  Inverted T waves have replaced nonspecific T wave abnormality in Inferior   leads  T wave inversion no longer evident in Lateral leads  Confirmed by Anisa Storey ([id number]) on 6/9/2021 8:16:53 AM      EKG, 12 LEAD, INITIAL [[id number]] Collected: 06/08/21 2313    Order Status: Completed Updated: 06/09/21 0815     Ventricular Rate 130 BPM      Atrial Rate 53 BPM      QRS Duration 82 ms      Q-T Interval 334 ms      QTC Calculation (Bezet) 491 ms      Calculated R Axis -29 degrees      Calculated T Axis 56 degrees      Diagnosis --     A.fib with RVR  Left ventricular hypertrophy with repolarization abnormality  Abnormal ECG  When compared with ECG of 22-JUL-2020 19:04,  Junctional rhythm has replaced Sinus rhythm  ST more depressed in Inferior leads  ST less depressed in Lateral leads  Confirmed by Sidra Cannon ([id number]) on 6/9/2021 8:15:01 AM      EKG, 12 LEAD, INITIAL [[id number]]     Order Status: Sent         09/04/19    ECHO ADULT COMPLETE 09/04/2019 9/4/2019    Interpretation Summary  · Left Ventricle: Normal cavity size, wall thickness and systolic function (ejection fraction normal). Estimated left ventricular ejection fraction is 56 - 60%. Biplane method used to measure ejection fraction. No regional wall motion abnormality noted. Normal left ventricular strain. Age-appropriate left ventricular diastolic function. · Mitral Valve: Trace mitral valve regurgitation. · Pulmonary Artery: There is no evidence of pulmonary hypertension. Signed by: Chinyere Cr DO on 9/4/2019  5:39 PM            XR Results (most recent):  Results from [place] encounter on 06/08/21    XR CHEST PORT    Narrative  AP CHEST, PORTABLE    INDICATION: Chest pain    COMPARISON: Chest x-ray 7/22/2020    FINDINGS:  EKG leads overlie the patient. Pacemaker leads are in similar  position. The cardiac silhouette is stable. Atherosclerosis noted. Possible hiatal hernia. The pulmonary vasculature is unremarkable. No focal consolidation, pleural  effusion, or pneumothorax. No acute osseous abnormalities are identified. Spondylosis and scoliosis again noted    Impression  1.  No acute finding. 2. Possible hiatal hernia. Signed By: TANIA Gilmore    June 9, 2021      I have personally and independently evaluated and examined the patient. All relevant labs and testing data are reviewed. I have personally reviewed all the diagnostic labs, available EKG imaging x-rays and other diagnostic data and incorporated them into my care. I am referencing [facility]'s note. I participated in medical decision making. Care plan discussed and updated after review.   Rafat Cullen MD

## 2021-06-09 NOTE — ED NOTES
Pt sitting up in bed without complaint at this time. Given update on bed placement/plan of care. Rails up x 2/call light in reach. Spouse at bedside.

## 2021-06-09 NOTE — DISCHARGE INSTRUCTIONS
DISCHARGE SUMMARY from Nurse    PATIENT INSTRUCTIONS:    After general anesthesia or intravenous sedation, for 24 hours or while taking prescription Narcotics:  · Limit your activities  · Do not drive and operate hazardous machinery  · Do not make important personal or business decisions  · Do  not drink alcoholic beverages  · If you have not urinated within 8 hours after discharge, please contact your surgeon on call. Report the following to your surgeon:  · Excessive pain, swelling, redness or odor of or around the surgical area  · Temperature over 100.5  · Nausea and vomiting lasting longer than 4 hours or if unable to take medications  · Any signs of decreased circulation or nerve impairment to extremity: change in color, persistent  numbness, tingling, coldness or increase pain  · Any questions    What to do at Home:  Recommended activity: Activity as tolerated,     If you experience any of the following symptoms chest pain, shortness of breath, nausea, vomiting, or fever, please follow up with Dr. Latonya Welsh. *  Please give a list of your current medications to your Primary Care Provider. *  Please update this list whenever your medications are discontinued, doses are      changed, or new medications (including over-the-counter products) are added. *  Please carry medication information at all times in case of emergency situations. These are general instructions for a healthy lifestyle:    No smoking/ No tobacco products/ Avoid exposure to second hand smoke  Surgeon General's Warning:  Quitting smoking now greatly reduces serious risk to your health.     Obesity, smoking, and sedentary lifestyle greatly increases your risk for illness    A healthy diet, regular physical exercise & weight monitoring are important for maintaining a healthy lifestyle    You may be retaining fluid if you have a history of heart failure or if you experience any of the following symptoms:  Weight gain of 3 pounds or more overnight or 5 pounds in a week, increased swelling in our hands or feet or shortness of breath while lying flat in bed. Please call your doctor as soon as you notice any of these symptoms; do not wait until your next office visit. Patient armband removed and shredded    The discharge information has been reviewed with the patient. The patient verbalized understanding. Discharge medications reviewed with the patient and appropriate educational materials and side effects teaching were provided.   ___________________________________________________________________________________________________________________________________

## 2021-06-09 NOTE — ED TRIAGE NOTES
Pt to ED with complaints of chest tightness, nausea, anxiety, and upper abdominal pain that started this afternoon and worsened tonight.

## 2021-06-09 NOTE — ROUTINE PROCESS
TRANSFER - OUT REPORT: 
 
Verbal report given to Madison Merritt RN(name) on Quentin Cantu  being transferred to 4N room 472(unit) for diagnosis of chest pain Report consisted of patients Situation, Background, Assessment and  
Recommendations(SBAR). Pt hx/allergy/complaint/findings/pt assessment/plan of care explained. Information from the following report(s): ED summary was reviewed with the receiving nurse. Lines:  
Peripheral IV 06/08/21 Right Antecubital (Active) Opportunity for questions and clarification was provided. Patient transported with: 
 cardiac monitor

## 2021-06-09 NOTE — PROGRESS NOTES
D/C order noted for today. Orders reviewed. Patient's family will be transporting patient home at time of discharge. No needs identified at this time. CM remains available if needed.                Cornelio Chavarria, [phone]

## 2021-06-09 NOTE — ED PROVIDER NOTES
EMERGENCY DEPARTMENT HISTORY AND PHYSICAL EXAM    11:15 PM  Date: 6/8/2021  Patient Name: Blanca Maxwell    History of Presenting Illness     Chief Complaint   Patient presents with    Chest Pain    Anxiety    Nausea        History Provided By: Patient    HPI: Blanca Maxwell is a 82 y.o. female with history of hypertension, dyslipidemia and atrial dysrhythmia status post dual-chamber pacemaker. On Xarelto. Patient is presenting with epigastric abdominal pain that is radiating to her chest, chest tightness and nausea that started this afternoon. Pain has been on and off and lasting 20 to 30 minutes every time. Associated with feeling anxious and lightheaded. No alleviating or aggravating factors. .  She reports that when she had her pacemaker placed she was having similar symptoms. The tightness and the abdominal discomfort got worse tonight so decided to come in. No history of cough or fever. No vomiting diarrhea. Patient is fully vaccinated against COVID-19. No history of previous VTE or recent travel. Location:  Severity:  Timing/course: Onset/Duration:     PCP: Praful Gupta MD    Past History     Past Medical History:  No past medical history on file. Past Surgical History:  Past Surgical History:   Procedure Laterality Date    [place] INS NEW/RPLCMT PRM PM W/TRANSV ELTRD ATRIAL&VENT Left 10/1/2019    INSERT PPM DUAL performed by Joey Eid MD at [address] [facility]       Family History:  No family history on file. Social History:  Social History     Tobacco Use    Smoking status: Never Smoker    Smokeless tobacco: Never Used   Vaping Use    Vaping Use: Never used   Substance Use Topics    Alcohol use: Never    Drug use: Never       Allergies: Allergies   Allergen Reactions    Sulfur Other (comments)     reflux       Review of Systems   Review of Systems   Respiratory: Positive for chest tightness and shortness of breath. Cardiovascular: Positive for chest pain. Gastrointestinal: Positive for abdominal pain and nausea. All other systems reviewed and are negative. Physical Exam     No data found. Physical Exam  Vitals and nursing note reviewed. Constitutional:       General: She is not in acute distress. Appearance: She is not diaphoretic. HENT:      Head: Normocephalic and atraumatic. Eyes:      Extraocular Movements: Extraocular movements intact. Cardiovascular:      Rate and Rhythm: Normal rate. Pulmonary:      Effort: Pulmonary effort is normal. No respiratory distress. Chest:      Chest wall: No tenderness. Abdominal:      Palpations: Abdomen is soft. Tenderness: There is no abdominal tenderness. Musculoskeletal:         General: Normal range of motion. Cervical back: Neck supple. Right lower leg: No tenderness. No edema. Left lower leg: No tenderness. No edema. Skin:     General: Skin is warm and dry. Neurological:      General: No focal deficit present. Mental Status: She is alert. Psychiatric:         Mood and Affect: Mood normal.         Diagnostic Study Results     Labs -  No results found for this or any previous visit (from the past 12 hour(s)). Radiologic Studies -   No results found. Medical Decision Making     ED Course: Progress Notes, Reevaluation, and Consults:    11:15 PM Initial assessment performed. The patients presenting problems have been discussed, and they/their family are in agreement with the care plan formulated and outlined with them. I have encouraged them to ask questions as they arise throughout their visit. Provider Notes (Medical Decision Making): 82-year-old female presenting with epigastric abdominal pain radiating to the chest associated with nausea and chest tightness. Patient is well-appearing on exam and not in distress.   Initial EKG showed junctional tachycardia, rate in the 130s however couple of minutes later she slowed down and the repeat EKG was normal sinus rhythm. Given the junctional rhythm as well as her risk factors she will need a cardiac work-up. Other than her age no risk factors for PE and she is anticoagulated. No previous history of coronary artery disease however her risk factors are high so she will need cardiac work-up, chest x-ray and likely admission. Heart score is 6. Discussed the options with the patient of admission versus close follow-up, I recommend admission given her heart score is in the initial rhythm. Patient feels comfortable with the admission plan. This was discussed with the hospitalist and patient has been accepted. HEART SCORE:   History: Moderately Suspicious  ECG: Nonspecific repolarization disturbance  Age: Greater than or equal to 65 years  Risk Factors: Greater than or equal to 3 risk factors, or history of atherosclerotic disease  Troponin: Less than or equal to normal limit   HEART Score Total : 6     Management   Scores 0-3: 0.9-1.7% risk of adverse cardiac event. In the HEART Score study, these patients were discharged (0.99% in the retrospective study, 1.7% in the prospective study)   Scores 4-6: 12-16.6% risk of adverse cardiac event. In the HEART Score study, these patients were admitted to the hospital. (11.6% retrospective, 16.6% prospective)   Scores ? 7: 50-65% risk of adverse cardiac event. In the HEART Score study, these patients were candidates for early invasive measures. (65.2% retrospective, 50.1% prospective)   A MACE (Major Adverse Cardiac Event) was defined as all-cause mortality, myocardial infarction, or coronary revascularization. Original/Primary Reference  · Arun Arellano Kelder JC. Chest pain in the emergency room: value of the HEART score. Neth Heart J. 2008;16(6):191-6. Validation  · Al Banda, Issac MINOR, et al. A prospective validation of the HEART score for chest pain patients at the emergency department. Int J Cardiol. 2013;168(3):2153-8.    · Al Banda, Madison Guzman et al. Chest pain in the emergency room: a multicenter validation of the HEART Score. Crit Pathw Cardiol. 2010;9(3):164-9. · Stormy BRIDGETTE, Shravan RF, Lachelle MONTEZ, et al. The HEART Pathway Randomized Controlled Trial One Year Outcomes. Acad Emerg Med. 2018;         Procedures:     Critical Care Time:     Vital Signs-Reviewed the patient's vital signs. Reviewed pt's pulse ox reading. EKG: Interpreted by the EP. Time Interpreted:    Rate:    Rhythm:    Interpretation:   Comparison:     Records Reviewed: Nursing Notes, Old Medical Records, Previous electrocardiograms and Previous Laboratory Studies (Time of Review: 11:15 PM)  -I am the first provider for this patient.  -I reviewed the vital signs, available nursing notes, past medical history, past surgical history, family history and social history. Current Outpatient Medications   Medication Sig Dispense Refill    Simbrinza 1-0.2 % drps       cycloSPORINE (Restasis) 0.05 % dpet Administer 1 Drop to both eyes every twelve (12) hours.  bimatoprost (LUMIGAN) 0.01 % ophthalmic drops Administer 1 Drop to both eyes every evening.  metoprolol tartrate 75 mg tab Take 75 mg by mouth two (2) times a day. 180 Tab 3    rivaroxaban (Xarelto) 20 mg tab tablet Take 1 Tab by mouth daily (with breakfast). 90 Tab 3    rosuvastatin (CRESTOR) 10 mg tablet       LEVOXYL 137 mcg tablet       RHOPRESSA 0.02 % drop           Clinical Impression     Clinical Impression: No diagnosis found. Disposition: admiit      This note was dictated utilizing voice recognition software which may lead to typographical errors. I apologize in advance if the situation occurs. If questions arise please do not hesitate to contact me or call our department.     Luci Smith MD  11:15 PM

## 2021-06-09 NOTE — PROGRESS NOTES
Problem: Patient Education: Go to Patient Education Activity  Goal: Patient/Family Education  Outcome: Progressing Towards Goal     Problem: Unstable angina/NSTEMI: Day of Admission/Day 1  Goal: Off Pathway (Use only if patient is Off Pathway)  Outcome: Progressing Towards Goal  Goal: Activity/Safety  Outcome: Progressing Towards Goal  Goal: Consults, if ordered  Outcome: Progressing Towards Goal  Goal: Diagnostic Test/Procedures  Outcome: Progressing Towards Goal  Goal: Nutrition/Diet  Outcome: Progressing Towards Goal  Goal: Discharge Planning  Outcome: Progressing Towards Goal  Goal: Medications  Outcome: Progressing Towards Goal  Goal: Respiratory  Outcome: Progressing Towards Goal  Goal: Treatments/Interventions/Procedures  Outcome: Progressing Towards Goal  Goal: Psychosocial  Outcome: Progressing Towards Goal  Goal: *Hemodynamically stable  Outcome: Progressing Towards Goal  Goal: *Optimal pain control at patient's stated goal  Outcome: Progressing Towards Goal  Goal: *Lungs clear or at baseline  Outcome: Progressing Towards Goal     Problem: Unstable angina/NSTEMI: Day 2  Goal: Off Pathway (Use only if patient is Off Pathway)  Outcome: Progressing Towards Goal  Goal: Activity/Safety  Outcome: Progressing Towards Goal  Goal: Consults, if ordered  Outcome: Progressing Towards Goal  Goal: Diagnostic Test/Procedures  Outcome: Progressing Towards Goal  Goal: Nutrition/Diet  Outcome: Progressing Towards Goal  Goal: Discharge Planning  Outcome: Progressing Towards Goal  Goal: Medications  Outcome: Progressing Towards Goal  Goal: Respiratory  Outcome: Progressing Towards Goal  Goal: Treatments/Interventions/Procedures  Outcome: Progressing Towards Goal  Goal: Psychosocial  Outcome: Progressing Towards Goal  Goal: *Hemodynamically stable  Outcome: Progressing Towards Goal  Goal: *Optimal pain control at patient's stated goal  Outcome: Progressing Towards Goal  Goal: *Lungs clear or at baseline  Outcome: Progressing Towards Goal     Problem: Unstable Angina/NSTEMI: Discharge Outcomes  Goal: *Hemodynamically stable  Outcome: Progressing Towards Goal  Goal: *Stable cardiac rhythm  Outcome: Progressing Towards Goal  Goal: *Lungs clear or at baseline  Outcome: Progressing Towards Goal  Goal: *Optimal pain control at patient's stated goal  Outcome: Progressing Towards Goal  Goal: *Identifies cardiac risk factors  Outcome: Progressing Towards Goal  Goal: *Verbalizes home exercise program, activity guidelines, cardiac precautions  Outcome: Progressing Towards Goal  Goal: *Verbalizes understanding and describes prescribed diet  Outcome: Progressing Towards Goal  Goal: *Verbalizes name, dosage, time, side effects, and number of days to continue medications  Outcome: Progressing Towards Goal  Goal: *Anxiety reduced or absent  Outcome: Progressing Towards Goal  Goal: *Understands and describes signs and symptoms to report to providers(Stroke Metric)  Outcome: Progressing Towards Goal  Goal: *Describes follow-up/return visits to physicians  Outcome: Progressing Towards Goal  Goal: *Describes available resources and support systems  Outcome: Progressing Towards Goal  Goal: *Influenza immunization  Outcome: Progressing Towards Goal  Goal: *Pneumococcal immunization  Outcome: Progressing Towards Goal  Goal: *Describes smoking cessation resources  Outcome: Progressing Towards Goal     Problem: Pain  Goal: *Control of Pain  Outcome: Progressing Towards Goal  Goal: *PALLIATIVE CARE:  Alleviation of Pain  Outcome: Progressing Towards Goal     Problem: Patient Education: Go to Patient Education Activity  Goal: Patient/Family Education  Outcome: Progressing Towards Goal

## 2021-06-09 NOTE — PROGRESS NOTES
Discharge instructions completed with patient and family. Provided patient opportunity to ask questions. All questions answered to patient's satisfaction.

## 2021-06-09 NOTE — ED NOTES
Pt resting awake/alert/calm/conversant in bed; denies any pain/complaints at this time. Pt given update on plan of care/explanation of wait; pt voiced her understanding. Spouse and rad tech at bedside.

## 2021-06-09 NOTE — PROGRESS NOTES
This nurse attempted to perform walk test with patient. Patient refused stating she is too tired after stress test.  Oxygen is 93% on Room air.

## 2021-06-09 NOTE — PROGRESS NOTES
Stress test is reported as low risk. Echo noted. Discussed with cardiology and he cleared the patient for discharge. Discussed with patient and family. Home today.

## 2021-06-14 NOTE — PROGRESS NOTES
Per your last note \" The etiology of this patient's syncope has not been clearly defined. The most likely case appears to be vagal syncope as has been associated with nausea and some diaphoresis along with a urge to have bowel movement. She does not have evidence of orthostatic hypotension on today's examination. Her blood pressure was 140/82 supine and 160/102 on standing. She has had no symptoms to indicate significant bradycardia or tachyarrhythmia but this will have to be further evaluated with a heart monitor. She does not have any clinical findings of severe aortic stenosis or other valvular heart disease. She has no physical findings of hypertrophic cardiomyopathy and no EKG evidence of ventricular hypertrophy.      She was once told that she has mitral valve prolapse, but she has no physical findings to indicate mitral valve prolapse on today's examination. Particularly, she does not have any regurgitation murmur of the mitral valve. She has no other symptoms to indicate any possibility of autonomic nerve dysfunction.      At this point, I would proceed with an echocardiogram and a 30-day event recorder. If a 30-day event recorder failed to demonstrate a significant degree of bradycardia or tachyarrhythmia with symptoms, then we could consider further noncardiac issues. If she does not have any symptoms during the 30 days, then we may consider an implantable loop recorder. Please follow up if you have any further issues.    You may contact me by phone or MyChart if you are worsening or if things are not improving.    ______________________________________________________________________     Please remember that you can call [phone] to schedule an appointment.     You can schedule appointments 24 hours a day, 7 days a week.  Sometimes the best time to schedule an appointment is after clinic hours when less people are calling in.  Weekends are another option for calling in to schedule appointments.        ______________________________________________________________________      1. Stop aspirin (prophylaxis)  2. Omeprazole (Prilosec) 20 mg po two times a day.  3. MRI scan of the abdomen.  4. Lorazepam (Ativan) for MRI scan.  5. Stop sucralfate (CARAFATE)   6. Update me in 2 weeks on her status with the omeprazole (Prilosec).

## 2021-07-02 ENCOUNTER — TRANSCRIBE ORDER (OUTPATIENT)
Dept: SCHEDULING | Age: 86
End: 2021-07-02

## 2021-07-02 DIAGNOSIS — M25.50 PAIN IN UNSPECIFIED JOINT: Primary | ICD-10-CM

## 2021-07-06 ENCOUNTER — OFFICE VISIT (OUTPATIENT)
Dept: CARDIOLOGY CLINIC | Age: 86
End: 2021-07-06
Payer: MEDICARE

## 2021-07-06 ENCOUNTER — HOSPITAL ENCOUNTER (OUTPATIENT)
Age: 86
Discharge: HOME | End: 2021-07-06
Attending: INTERNAL MEDICINE

## 2021-07-06 VITALS
HEART RATE: 72 BPM | WEIGHT: 148 LBS | BODY MASS INDEX: 24.66 KG/M2 | SYSTOLIC BLOOD PRESSURE: 130 MMHG | OXYGEN SATURATION: 97 % | DIASTOLIC BLOOD PRESSURE: 82 MMHG | HEIGHT: 65 IN

## 2021-07-06 DIAGNOSIS — I47.1 PSVT (PAROXYSMAL SUPRAVENTRICULAR TACHYCARDIA) (HCC): ICD-10-CM

## 2021-07-06 DIAGNOSIS — Z95.0 CARDIAC PACEMAKER IN SITU: ICD-10-CM

## 2021-07-06 DIAGNOSIS — M25.50 PAIN IN UNSPECIFIED JOINT: ICD-10-CM

## 2021-07-06 DIAGNOSIS — I49.5 TACHYCARDIA-BRADYCARDIA SYNDROME (HCC): Primary | ICD-10-CM

## 2021-07-06 DIAGNOSIS — Z95.0 PACEMAKER: ICD-10-CM

## 2021-07-06 DIAGNOSIS — E78.00 HYPERCHOLESTEREMIA: ICD-10-CM

## 2021-07-06 PROCEDURE — G8536 NO DOC ELDER MAL SCRN: HCPCS | Performed by: NURSE PRACTITIONER

## 2021-07-06 PROCEDURE — G8432 DEP SCR NOT DOC, RNG: HCPCS | Performed by: NURSE PRACTITIONER

## 2021-07-06 PROCEDURE — 99214 OFFICE O/P EST MOD 30 MIN: CPT | Performed by: NURSE PRACTITIONER

## 2021-07-06 PROCEDURE — 1090F PRES/ABSN URINE INCON ASSESS: CPT | Performed by: NURSE PRACTITIONER

## 2021-07-06 PROCEDURE — G8400 PT W/DXA NO RESULTS DOC: HCPCS | Performed by: NURSE PRACTITIONER

## 2021-07-06 PROCEDURE — G8420 CALC BMI NORM PARAMETERS: HCPCS | Performed by: NURSE PRACTITIONER

## 2021-07-06 PROCEDURE — 1101F PT FALLS ASSESS-DOCD LE1/YR: CPT | Performed by: NURSE PRACTITIONER

## 2021-07-06 PROCEDURE — G8427 DOCREV CUR MEDS BY ELIG CLIN: HCPCS | Performed by: NURSE PRACTITIONER

## 2021-07-06 PROCEDURE — 1111F DSCHRG MED/CURRENT MED MERGE: CPT | Performed by: NURSE PRACTITIONER

## 2021-07-06 RX ORDER — METHYLPREDNISOLONE 4 MG/1
TABLET ORAL
COMMUNITY
Start: 2021-07-02 | End: 2021-10-22 | Stop reason: ALTCHOICE

## 2021-07-06 RX ORDER — TRAMADOL HYDROCHLORIDE 50 MG/1
TABLET ORAL
COMMUNITY
Start: 2021-07-02 | End: 2021-10-26 | Stop reason: ALTCHOICE

## 2021-07-06 RX ORDER — LANOLIN ALCOHOL/MO/W.PET/CERES
1000 CREAM (GRAM) TOPICAL DAILY
COMMUNITY

## 2021-07-06 RX ORDER — GLUCOSAM/CHONDRO/HERB 149/HYAL 750-100 MG
1 TABLET ORAL DAILY
COMMUNITY
End: 2021-10-22

## 2021-07-06 RX ORDER — METOPROLOL TARTRATE 100 MG/1
100 TABLET ORAL 2 TIMES DAILY
Qty: 180 TABLET | Refills: 3 | Status: SHIPPED | OUTPATIENT
Start: 2021-07-06 | End: 2022-07-27 | Stop reason: SDUPTHER

## 2021-07-06 NOTE — PROGRESS NOTES
Davi Herrera presents today for   Chief Complaint   Patient presents with    Follow-up     3 month f/u; Al Borges 4 week f/u       Davi Herrera preferred language for health care discussion is english/other. Is someone accompanying this pt? no    Is the patient using any DME equipment during [address]? no    Depression Screening:  3 most recent PHQ Screens 7/6/2021   Little interest or pleasure in doing things Not at all   Feeling down, depressed, irritable, or hopeless Not at all   Total Score PHQ 2 0       Learning Assessment:  Learning Assessment 7/10/2019   PRIMARY LEARNER Patient   BARRIERS PRIMARY LEARNER [address] CAREGIVER No   PRIMARY LANGUAGE ENGLISH   LEARNER PREFERENCE PRIMARY READING   ANSWERED BY patient   RELATIONSHIP SELF       Abuse Screening:  Abuse Screening Questionnaire 7/6/2021   Do you ever feel afraid of your partner? N   Are you in a relationship with someone who physically or mentally threatens you? N   Is it safe for you to go home? Y       Fall Risk  Fall Risk Assessment, last 12 mths 7/6/2021   Able to walk? Yes   Fall in past 12 months? 0   Do you feel unsteady? 0   Are you worried about falling 0       Pt currently taking Anticoagulant therapy? Xarelto 20mg    Coordination of Care:  1. Have you been to the ER, urgent care clinic since your last visit? Hospitalized since your last visit? no    2. Have you seen or consulted any other health care providers outside of the [address] [place] [address] since your last visit? Include any pap smears or colon screening.  no

## 2021-07-06 NOTE — PATIENT INSTRUCTIONS
Continue present medication regimen  [facility] device check from home as scheduled for July  Follow-up with Dr. Tien Calderon as scheduled and as needed

## 2021-07-06 NOTE — PROGRESS NOTES
Renan Shook presents today for a post-hospital follow-up. She presented to the ER on 6/8/21 with complaints of epigastric abdominal pain that radiated to her chest, chest tightness, and nausea. She ruled out for MI with 4 sets of troponins <0.02. She was noted to be in AFIB with RVR on admission but she converted back to sinus rhythm. She underwent a pharmacologic nuclear stress test on 6/9/21 and it showed no convincing evidence of significant reversible defect to suggest major ongoing ischemia and she also had an echo done and it showed an EF of 55-60%. Her pacemaker was interrogated during her hospital stay and it showed that she was having episodes of SVT prior to presenting to the ER. Her metoprolol tartrate was increased to 100mg BID. She is an 82year old female with history of hypertension, dyslipidemia and sick sinus syndrome s/p dual-chamber pacemaker on 10/1/19. She was last seen by Dr. Aditya Borrego on 4/23/21. Denies chest pain, tightness, heaviness, and admits to occasional palpitations. Denies shortness of breath at rest, dyspnea on exertion, orthopnea and PND. Denies abdominal bloating. Denies lightheadedness, dizziness, and syncope. Denies lower extremity edema and claudication. Denies nausea, vomiting, diarrhea, melena, hematochezia. Denies hematuria, urgency, frequency. Denies fever, chills. PMH:  No past medical history on file. PSH:  Past Surgical History:   Procedure Laterality Date    DE INS NEW/RPLCMT PRM PM W/TRANSV ELTRD ATRIAL&VENT Left 10/1/2019    INSERT PPM DUAL performed by Kristan Bower MD at [address] [place] [address] LAB       MEDS:  Current Outpatient Medications   Medication Sig    mv-mn/folic ac/calcium/vit K1 (WOMEN'S 50 PLUS MULTIVITAMIN PO) Take  by mouth.  omega 3-DHA-EPA-fish oil 1,000 mg (120 mg-180 mg) capsule Take 1 Capsule by mouth daily.  cyanocobalamin 1,000 mcg tablet Take 1,000 mcg by mouth daily.     traMADoL (ULTRAM) 50 mg tablet TAKE 1 TABLET BY MOUTH EVERY 8 HOURS FOR 7 DAYS AS NEEDED    methylPREDNISolone (MEDROL DOSEPACK) 4 mg tablet FOLLOW PACKAGE DIRECTIONS    metoprolol tartrate (LOPRESSOR) 100 mg IR tablet Take 1 Tablet by mouth two (2) times a day.  Simbrinza 1-0.2 % drps     cycloSPORINE (Restasis) 0.05 % dpet Administer 1 Drop to both eyes every twelve (12) hours.  bimatoprost (LUMIGAN) 0.01 % ophthalmic drops Administer 1 Drop to both eyes every evening.  rivaroxaban (Xarelto) 20 mg tab tablet Take 1 Tab by mouth daily (with breakfast).  rosuvastatin (CRESTOR) 10 mg tablet     LEVOXYL 137 mcg tablet     RHOPRESSA 0.02 % drop      No current facility-administered medications for this visit. Allergies and Sensitivities:  Allergies   Allergen Reactions    Sulfur Other (comments)     reflux       Family History:  No family history on file. Social History:  She  reports that she has never smoked. She has never used smokeless tobacco.  She  reports no history of alcohol use. Physical:  Visit Vitals  /82 (BP 1 Location: Left upper arm, BP Patient Position: Sitting, BP Cuff Size: Adult)   Pulse 72   Ht 5' 5\" (1.651 m)   Wt 67.1 kg (148 lb)   SpO2 97%   BMI 24.63 kg/m²         Exam:  Neck:  Supple, no JVD, no carotid bruits  CV:  Normal S1 and  S2, no murmurs, rubs, or gallops noted  Lungs:  Clear to ausculation throughout, no wheezes or rales  Abd:  Soft, non-tender, non-distended with good bowel sounds.   No hepatosplenomegaly  Extremities:  1+ lower extremity edema, wearing a brace around her left ankle      Data:  EKG:  Not done today      LABS:  Lab Results   Component Value Date/Time    Sodium 140 06/08/2021 11:28 PM    Potassium 3.6 06/08/2021 11:28 PM    Chloride 108 06/08/2021 11:28 PM    CO2 28 06/08/2021 11:28 PM    Glucose 107 (H) 06/08/2021 11:28 PM    BUN 12 06/08/2021 11:28 PM    Creatinine 0.91 06/08/2021 11:28 PM     Lab Results   Component Value Date/Time    Cholesterol, total 148 01/21/2021 02:00 PM    HDL Cholesterol 49 01/21/2021 02:00 PM    LDL, calculated 73.6 01/21/2021 02:00 PM    Triglyceride 127 01/21/2021 02:00 PM    CHOL/HDL Ratio 3.0 01/21/2021 02:00 PM     Lab Results   Component Value Date/Time    ALT (SGPT) 56 06/08/2021 11:28 PM         Impression/Plan:  1. SVT, episodes noted on pacemaker check, metoprolol tartrate was increased to 100mg BID  2. SSS, s/p dual chamber [employer] pacemaker in 2019  3. Paroxysmal atrial fibrillation, anticoagulated with Xarelto  4. Essential hypertension, blood pressure controlled  5. Dyslipidemia, on rosuvastatin 10mg    Mrs. Josh Duarte was seen today for a post-hospital follow-up. She presented to the ER on 6/8/21 with complaints of epigastric abdominal pain that radiated to her chest, chest tightness, and nausea. She ruled out for MI with 4 sets of troponins <0.02. She underwent a pharmacologic nuclear stress test on 6/9/21 and it showed no convincing evidence of significant reversible defect to suggest major ongoing ischemia and she also had an echo done and it showed an EF of 55-60%. A pacemaker interrogation showed more frequent episodes of SVT since 6/2/21 (9 episodes) and her metoprolol tartrate was increased to 100mg BID which she is tolerating well. Her blood pressure is well controlled. She is due for a [facility] device check at the end of the month and I asked that she send in a transmission so we can evaluate the effectiveness of the increased dose of metoprolol. She is feeling better. I reviewed the results of the stress test and echo with her and her son. They were not sure why her beta blocker was increased so I explained the device check findings with them and medication teaching was done. Greater than 50% of a 40 minute visit was spent in discussion, counseling, and answering questions. She will follow-up with Dr. Carmen Mcgovern as scheduled and as needed.       Radha Lopez MSN, FNP-BC    Please note:  Portions of this chart were created with Dragon medical speech to text program.  Unrecognized errors may be present.

## 2021-07-15 ENCOUNTER — OFFICE VISIT (OUTPATIENT)
Dept: ORTHOPEDIC SURGERY | Age: 86
End: 2021-07-15
Payer: MEDICARE

## 2021-07-15 VITALS
HEIGHT: 65 IN | HEART RATE: 67 BPM | OXYGEN SATURATION: 100 % | BODY MASS INDEX: 24.83 KG/M2 | TEMPERATURE: 96.9 F | WEIGHT: 149 LBS

## 2021-07-15 DIAGNOSIS — S82.65XA CLOSED NONDISPLACED FRACTURE OF LATERAL MALLEOLUS OF LEFT FIBULA, INITIAL ENCOUNTER: Primary | ICD-10-CM

## 2021-07-15 DIAGNOSIS — S93.492A SPRAIN OF ANTERIOR TALOFIBULAR LIGAMENT OF LEFT ANKLE, INITIAL ENCOUNTER: ICD-10-CM

## 2021-07-15 DIAGNOSIS — M19.072 PRIMARY OSTEOARTHRITIS OF LEFT FOOT: ICD-10-CM

## 2021-07-15 DIAGNOSIS — M25.572 ACUTE LEFT ANKLE PAIN: ICD-10-CM

## 2021-07-15 DIAGNOSIS — M79.672 LEFT FOOT PAIN: ICD-10-CM

## 2021-07-15 PROCEDURE — G8400 PT W/DXA NO RESULTS DOC: HCPCS | Performed by: ORTHOPAEDIC SURGERY

## 2021-07-15 PROCEDURE — G8536 NO DOC ELDER MAL SCRN: HCPCS | Performed by: ORTHOPAEDIC SURGERY

## 2021-07-15 PROCEDURE — 73600 X-RAY EXAM OF ANKLE: CPT | Performed by: ORTHOPAEDIC SURGERY

## 2021-07-15 PROCEDURE — G8510 SCR DEP NEG, NO PLAN REQD: HCPCS | Performed by: ORTHOPAEDIC SURGERY

## 2021-07-15 PROCEDURE — 1101F PT FALLS ASSESS-DOCD LE1/YR: CPT | Performed by: ORTHOPAEDIC SURGERY

## 2021-07-15 PROCEDURE — G8427 DOCREV CUR MEDS BY ELIG CLIN: HCPCS | Performed by: ORTHOPAEDIC SURGERY

## 2021-07-15 PROCEDURE — 27786 TREATMENT OF ANKLE FRACTURE: CPT | Performed by: ORTHOPAEDIC SURGERY

## 2021-07-15 PROCEDURE — 99204 OFFICE O/P NEW MOD 45 MIN: CPT | Performed by: ORTHOPAEDIC SURGERY

## 2021-07-15 PROCEDURE — 73630 X-RAY EXAM OF FOOT: CPT | Performed by: ORTHOPAEDIC SURGERY

## 2021-07-15 PROCEDURE — G8420 CALC BMI NORM PARAMETERS: HCPCS | Performed by: ORTHOPAEDIC SURGERY

## 2021-07-15 PROCEDURE — 1090F PRES/ABSN URINE INCON ASSESS: CPT | Performed by: ORTHOPAEDIC SURGERY

## 2021-07-15 RX ORDER — ALENDRONATE SODIUM 70 MG/1
35 TABLET ORAL
COMMUNITY
End: 2022-05-11

## 2021-07-15 NOTE — PROGRESS NOTES
AMBULATORY PROGRESS NOTE      Patient: Jose R Apodaca             MRN: [id number]     SSN: [id number] Body mass index is 24.79 kg/m². YOB: 1935     AGE: 82 y.o. EX: female    PCP: Roslyn Grissom MD       IMPRESSION //  DIAGNOSIS AND TREATMENT PLAN        Jose R Apodaca has a diagnosis of:      Discussion conducted with her, regarding her injury: Left ankle sprain, left transverse fibula fracture, Nieto a, that appears stable: Recommendations protecting her in a brace. I do not want her to fall, her balance is still a bit precarious, subcutaneous with Aircast brace, with a stable ankle fracture. I did not put in a cam walker boot, as I was concerned about her falling and having a fracture about the boot proximity to the boot. Plans discussed with him. Explained to her and her  that less these fractures heal without any need for surgery. Some percentage of fractures may not heal me have a painless nonunion, and we still leave the left alone, less risk for larissa instability gross instability, worsening pain at the nonunion site. She understands that most patients to heal this without require any surgical invention. She understands fracture healing may take upwards to 8 to 10 weeks to heal minimum. Encourage calcium vitamin D in her diet as well. DIAGNOSES    1. Closed nondisplaced fracture of lateral malleolus of left fibula, initial encounter    2. Sprain of anterior talofibular ligament of left ankle, initial encounter    3. Primary osteoarthritis of left foot    4. Left foot pain    5.  Acute left ankle pain        Orders Placed This Encounter    Generic Supply Order     Left airsport brace  medium    AMB POC XRAY, FOOT; COMPLETE, 3+ VIEW     ASK ALL FEMALE PATIENTS IF PREGNANT     Order Specific Question:   Reason for Exam     Answer:   PAIN    POC XRAY, ANKLE; 2 VIEWS     Order Specific Question:   Reason for Exam     Answer:   pain    OR CLOSED TX DIST FIBULA FX    alendronate (Fosamax) 70 mg tablet     Sig: Take 35 mg by mouth. PLAN:    1. Placed in a air sport Aircast brace, she was: To his left lower extremity:  2. Weight-bear as tolerated, and this is per Aircast brace, with a cane, walker. She has a single-point cane is ambulating quite well in the office I witnessed today. RTO-3-week follow-up, x-rays of her left ankle upon next visit. Jesus Narayanan  expresses understanding of the diagnosis, treatment plan, and all of their proposed questions were answered to their satisfaction. Patient education has been provided re the diagnoses. HPI //  1200 Marcio Streeter IS A 82 y.o. female who is a/an  new patient, presenting to my outpatient office for evaluation of  the following chief complaint(s):     Chief Complaint   Patient presents with    Foot Pain     left foot       Jesus Narayanan having tripped over carpet coming into her home number 3 days ago. She is seen today, for complaints of left lateral ankle pain and swelling. She was actually trying to get to a ringing phone, and that she is walking to try to reach for the phone, her left ankle twisted, and she describes her foot bending forward. She states she had quite a bit of pain to the dorsal part of her foot, that the pain is resolved, but her pain is lingering, looking at lateral portion of her left ankle. She applied ice, rested, and has been using her single-point cane. She describes also having some burning sensation to lateral part of her ankle, near the fifth metatarsal region, and elongated lateral fibula. Pain is 4 out of 10 on the subjective pain scale, she is taking over-the-counter Tylenol and still helping her quite a bit.   She cannot take anti-inflammatory agents, she states, as this has been recommended that she not take anti-inflammatory agents, by her PCP// Juan Hickey MD     Visit Vitals  Pulse 67   Temp 96.9 °F (36.1 °C) (Temporal)   Ht 5' 5\" (1.651 m)   Wt 149 lb (67.6 kg)   SpO2 100%   BMI 24.79 kg/m²       Appearance: Alert, well appearing and pleasant patient who is in no distress, oriented to person, place/time, and who follows commands. This patient is accompanied in the examination room by her  spouse. There is signs of: no dementia  Psychiatric: Affect/mood are appropriate. Speech normal in context and clarity, memory intact grossly, no involuntary movements - tremors. Patient arrives to office via: with assistive device: CANE  H EENT (2): Head normocephalic & atraumatic. Eye: pupils are round// EOM are intact // Neck: ROM WNL  // Hearings Intact   Respiratory: Breathing non labored     ANKLE/FOOT left    Gait: slow and SLIGHT LIMP  Tenderness: mild distal tip, left fibula, mild Pereonal brevis region//insertion point  Cutaneous: Slight bruising, and swelling to the anterior portion of the left ankle distal fibula specifically  Joint Motion: Diminished range of motion of ankle, as relates to her try and achieve full inversion. But she has full eversion, diminished plantarflexion, has near full dorsiflexion. Joint / Tendon Stability: Not tested or conducted due to tenderness   Alignment: neutral Hindfoot,    Neuro Motor/Sensory: NL/NL  Vascular: NL foot/ankle pulses,   Lymphatics: No extremity lymphedema, No calf swelling, no tenderness to calf muscles. CHART REVIEW     Miguelito Godwin has been experiencing pain and discomfort confirmed as outlined in the pain assessment outlined below.  was reviewed by Sara Lockwood MD on 7/15/2021. Pain Assessment  7/15/2021   Location of Pain Foot   Location Modifiers Left   Severity of Pain 4   Quality of Pain Burning; Throbbing   Duration of Pain A few hours   Frequency of Pain Intermittent   Date Pain First Started 6/15/2021   Aggravating Factors Walking;Standing   Limiting Behavior Yes   Relieving Factors Rest;Elevation; Other (Comment)   Relieving Factors Comment tylenol   Result of Injury Yes   Work-Related Injury No   Type of Injury Other (Comment)   Type of Injury Comment manolo Daigle  has a past medical history of Atrial fibrillation ([facility] and Osteoporosis. Patients is employed at:         Past Medical History:   Diagnosis Date    Atrial fibrillation ([facility]     Osteoporosis      Past Surgical History:   Procedure Laterality Date    [place] CARDIAC SURG PROCEDURE UNLIST  10/01/2019    [place] INS NEW/RPLCMT PRM PM W/TRANSV ELTRD ATRIAL&VENT Left 10/1/2019    INSERT PPM DUAL performed by Emmanuel Campbell MD at [facility] CATH LAB     Current Outpatient Medications   Medication Sig    alendronate (Fosamax) 70 mg tablet Take 35 mg by mouth.  mv-mn/folic ac/calcium/vit K1 (WOMEN'S 50 PLUS MULTIVITAMIN PO) Take  by mouth.  omega 3-DHA-EPA-fish oil 1,000 mg (120 mg-180 mg) capsule Take 1 Capsule by mouth daily.  cyanocobalamin 1,000 mcg tablet Take 1,000 mcg by mouth daily.  metoprolol tartrate (LOPRESSOR) 100 mg IR tablet Take 1 Tablet by mouth two (2) times a day.  Simbrinza 1-0.2 % drps     cycloSPORINE (Restasis) 0.05 % dpet Administer 1 Drop to both eyes every twelve (12) hours.  bimatoprost (LUMIGAN) 0.01 % ophthalmic drops Administer 1 Drop to both eyes every evening.  rivaroxaban (Xarelto) 20 mg tab tablet Take 1 Tab by mouth daily (with breakfast).  rosuvastatin (CRESTOR) 10 mg tablet     LEVOXYL 137 mcg tablet     RHOPRESSA 0.02 % drop     traMADoL (ULTRAM) 50 mg tablet TAKE 1 TABLET BY MOUTH EVERY 8 HOURS FOR 7 DAYS AS NEEDED (Patient not taking: Reported on 7/15/2021)    methylPREDNISolone (MEDROL DOSEPACK) 4 mg tablet FOLLOW PACKAGE DIRECTIONS (Patient not taking: Reported on 7/15/2021)     No current facility-administered medications for this visit.      Allergies   Allergen Reactions    Sulfur Other (comments)     reflux     Social History     Occupational History    Not on file   Tobacco Use    Smoking status: Never Smoker    Smokeless tobacco: Never Used   Vaping Use    Vaping Use: Never used   Substance and Sexual Activity    Alcohol use: Never    Drug use: Never    Sexual activity: Not on file     History reviewed. No pertinent family history. DIAGNOSTIC LAB DATA      Lab Results   Component Value Date/Time    Hemoglobin A1c 5.5 01/21/2021 02:04 PM    //   Lab Results   Component Value Date/Time    Glucose 107 (H) 06/08/2021 11:28 PM        No results found for: [id number], [id number]      Lab Results   Component Value Date/Time    Vitamin D 25-Hydroxy 28.2 (L) 01/21/2021 02:02 PM         REVIEW OF SYSTEMS : 7/15/2021  ALL BELOW ARE Negative except : SEE HPI     All other systems reviewed and are negative. 12 point review of systems otherwise negative unless noted in HPI. DIAGNOSTIC IMAGING /ORDERS       Orders Placed This Encounter    Generic Supply Order     Left airsport brace  medium    AMB POC XRAY, FOOT; COMPLETE, 3+ VIEW     ASK ALL FEMALE PATIENTS IF PREGNANT     Order Specific Question:   Reason for Exam     Answer:   PAIN    POC XRAY, ANKLE; 2 VIEWS     Order Specific Question:   Reason for Exam     Answer:   pain    [place] CLOSED [place] DIST FIBULA FX    alendronate (Fosamax) 70 mg tablet     Sig: Take 35 mg by mouth. FOOT X RAYS 3 VIEWS LEFT  7/15/2021    NON WEIGHT BEARING    X RAYS AT [address] [place] [address]  7/15/2021      Bones: No fractures or dislocations. No focal osteolytic or osteoblastic process     Bone Spurs: No significant bone spurs  Foot Alignment: WNL  Joint Condition: yes midfoot has Significant OA  Soft Tissues: Normal, No radiopaque foreign body and No abnormal calcific densities to soft tissues   No ankle joint effusion in lateral projection.   Mineralization: Suggests Osteopenia    I have personally reviewed the results of the above study and the interpretation of this study is my professional opinion         ANKLE X RAYS 3 VIEWS LEFT  X RAYS AT [facility]  7/15/2021    NON WEIGHT BEARING    X RAYS AT [address] [place] [address]  7/15/2021    Bones: Nieto A fibular fracture, seen. Best seen in the oblique image, transverse fracture very distal portion of the left ankle. No dislocations. No focal osteolytic or osteoblastic process     Bone Spurs: No significant bone spurs  Alignment: Ankle mortise alignment is congruent, Tibial plafond and talar dome intact. No Osteochondral defects seen   Joint: No Significant OA changes present  Soft Tissues: Normal, No radiopaque foreign body     No abnormal calcific densities to soft tissues    No ankle joint effusion in lateral projection. Mineralization: Suggests Osteopenia    I have personally reviewed the results of the above study. The interpretation of this study is my professional opinion              On this date 07/15/2021 I have spent 45 minutes reviewing previous notes, test results and face to face with the patient discussing the diagnosis and importance of compliance with the treatment plan as well as documenting on the day of the visit. An electronic signature was used to authenticate this note. Shirlene Desouza MD  7/15/2021  7:27 AM      Disclaimer: Sections of this note are dictated using utilizing voice recognition software, which may have resulted in some phonetic based errors in grammar and contents. Even though attempts were made to correct all the mistakes, some may have been missed, and remained in the body of the document. If questions arise, please contact our department. Sandra Aguilar may have a reminder for a \"due or due soon\" health maintenance. I have asked that she contact her primary care provider for follow-up on this health maintenance.       Shirlene Desouza MD  7/15/2021  7:27 AM

## 2021-07-19 NOTE — DISCHARGE SUMMARY
[address] [place] [address] Dr  [address], [facility], [address]     DISCHARGE SUMMARY    Name: Uriel Cedeno MRN: [id number]   Age / Sex: 82 y.o. / female CSN: [id number]   YOB: 1935 Length of Stay: 1 days   Admit Date: 6/8/2021 Discharge Date:        PRIMARY CARE PHYSICIAN: Frances Britton MD      DISCHARGE DIAGNOSES:    1. Atypical chest pain/chest pressure  2. History of paroxysmal A. fib rate controlled with beta-blocker, [address] [place] [address] Road with Xarelto. 3. H/o SSS status post PPM 2019.  4. History of hypothyroidism    CONSULTS CALLED: Cardiology      PROCEDURES DONE: None      COURSE IN THE HOSPITAL: This is a 61-year-old female who presented to the ED with some chest pain. Patient was evaluated and was admitted. Cardiac biomarkers were trended. Cardiology was consulted. Patient underwent stress test.  Stress test was low risk. Cardiology cleared the patient for discharge. Discharge plans were discussed with the patient and family. Patient was discharged home. MEDICATIONS ON DISCHARGE:    Discharge Medication List as of 6/9/2021  3:45 PM      START taking these medications    Details   famotidine (Pepcid) 20 mg tablet Take 1 Tablet by mouth daily for 14 days. , Print, Disp-14 Tablet, R-0         CONTINUE these medications which have CHANGED    Details   metoprolol tartrate (LOPRESSOR) 100 mg IR tablet Take 1 Tablet by mouth two (2) times a day., Print, Disp-60 Tablet, R-0         CONTINUE these medications which have NOT CHANGED    Details   Simbrinza 1-0.2 % drps Historical Med, ESPERANZA      cycloSPORINE (Restasis) 0.05 % dpet Administer 1 Drop to both eyes every twelve (12) hours. , Historical Med      bimatoprost (LUMIGAN) 0.01 % ophthalmic drops Administer 1 Drop to both eyes every evening., Historical Med      rivaroxaban (Xarelto) 20 mg tab tablet Take 1 Tab by mouth daily (with breakfast). , Normal, Disp-90 Tab, R-3      rosuvastatin (CRESTOR) 10 mg tablet Historical Med      LEVOXYL 137 mcg tablet Historical Med, ESPERANZA      RHOPRESSA 0.02 % drop Historical Med, ESPERANZA               DISCHARGE VITAL SIGNS:  Visit Vitals  BP (!) 163/89   Pulse 62   Temp 97.7 °F (36.5 °C)   Resp 18   Ht 5' 5\" (1.651 m)   Wt 68.9 kg (152 lb)   SpO2 96%   BMI 25.29 kg/m²       CONDITION ON DISCHARGE: Stable. DISPOSITION: home      FOLLOW-UP RECOMMENDATIONS:   Follow-up Information     Follow up With Specialties Details Why Contact Info    Les Soares MD Internal Medicine   [address] [place] [address] [id number] [phone]            OTHER INSTRUCTIONS:        TIME SPENT ON DISCHARGE ACTIVITIES: More than 35 minutes. Dragon medical dictation software was used for portions of this report. Unintended errors may occur.       Signed:  Jigar Bennett MD      7/19/2021

## 2021-07-28 ENCOUNTER — OFFICE VISIT (OUTPATIENT)
Dept: CARDIOLOGY CLINIC | Age: 86
End: 2021-07-28
Payer: MEDICARE

## 2021-07-28 DIAGNOSIS — I49.5 TACHYCARDIA-BRADYCARDIA SYNDROME (HCC): Primary | ICD-10-CM

## 2021-07-28 DIAGNOSIS — Z95.0 PACEMAKER: ICD-10-CM

## 2021-07-28 PROCEDURE — 93294 REM INTERROG EVL PM/LDLS PM: CPT | Performed by: INTERNAL MEDICINE

## 2021-07-28 PROCEDURE — 93296 REM INTERROG EVL PM/IDS: CPT | Performed by: INTERNAL MEDICINE

## 2021-07-29 NOTE — PROGRESS NOTES
I have personally seen and evaluated the device findings. Interrogation reviewed and I agree with assessment.     Darleen Damon

## 2021-08-04 NOTE — PROGRESS NOTES
AMBULATORY PROGRESS NOTE      Patient: Davi Herrera             MRN: [id number]     SSN: [id number] Body mass index is 25.46 kg/m². YOB: 1935     AGE: 82 y.o. EX: female    PCP: Abril Alvarado MD       IMPRESSION //  DIAGNOSIS AND TREATMENT PLAN        Davi Herrera has a diagnosis of:      DIAGNOSES    1. Closed nondisplaced fracture of lateral malleolus of left fibula, initial encounter    2. Acute left ankle pain        Orders Placed This Encounter    [[id number]] Ankle 2V     Order Specific Question:   Weight bearing? Answer:   No        PLAN:    1. Obtain 2-view XR of left ankle  2. Continue to wear left AS/AC brace    PLEASE OBTAIN X-RAYS OF: left ankle 3 VIEWS     RTO-  F/u on 9/8/2021    Davi Herrera  expresses understanding of the diagnosis, treatment plan, and all of their proposed questions were answered to their satisfaction. Patient education has been provided re the diagnoses. HPI //  1200 Marcio Streeter IS A 82 y.o. female who is a/an  established patient, presenting to my outpatient office for evaluation of  the following chief complaint(s):     Chief Complaint   Patient presents with    Ankle Pain     left, follow up     DOI: 7/12/2021    At LOV presented w/ left foot pain. Placed an AS/AC brace on patients lower left extremity. Advised to weight-bear as tolerated w/ all assistance devices: AS/AC , cane, and walker. Since LOV patient presents w/ improved left foot pain. She was able to walk her dog w/ minimal pain. Patient has been compliant w/ her AS/AC brace. Visit Vitals  Pulse 60   Temp 98.6 °F (37 °C) (Skin)   Ht 5' 5\" (1.651 m)   Wt 153 lb (69.4 kg)   SpO2 98%   BMI 25.46 kg/m²       Appearance: Alert, well appearing and pleasant patient who is in no distress, oriented to person, place/time, and who follows commands. This patient is accompanied in the examination room by her  .  There is signs of: no dementia  Psychiatric: Affect/mood are appropriate. Speech normal in context and clarity, memory intact grossly, no involuntary movements - tremors. Patient arrives to office via: without assistive device:    H EENT (2): Head normocephalic & atraumatic. Eye: pupils are round// EOM are intact // Neck: ROM WNL  // Hearings Intact   Respiratory: Breathing non labored     ANKLE/FOOT left    Gait: slow  Tenderness: mild over distal fibula only // of left ankle /// NT foot (MF,FF,HF regions)  Cutaneous: mild swelling  Joint Motion:   WNL  Joint / Tendon Stability: No Ankle or Subtalar instability or joint laxity. No peroneal sublux ability or dislocation  Alignment: neutral Hindfoot,    Neuro Motor/Sensory: NL/NL  Vascular: NL foot/ankle pulses,   Lymphatics: No extremity lymphedema, No calf swelling, no tenderness to calf muscles. CHART REVIEW     Obi Murphy has been experiencing pain and discomfort confirmed as outlined in the pain assessment outlined below.  was reviewed by Aaliyah Jimenez MD on 8/9/2021. Pain Assessment  8/9/2021   Location of Pain Ankle   Location Modifiers Left   Severity of Pain 0   Quality of Pain -   Duration of Pain -   Frequency of Pain -   Date Pain First Started -   Aggravating Factors -   Limiting Behavior -   Relieving Factors -   Relieving Factors Comment -   Result of Injury -   Work-Related Injury -   Type of Injury -   Type of Injury Comment -        bOi Murphy  has a past medical history of Atrial fibrillation ([facility] and Osteoporosis.      Patients is employed at:         Past Medical History:   Diagnosis Date    Atrial fibrillation ([facility]     Osteoporosis      Past Surgical History:   Procedure Laterality Date    [place] CARDIAC SURG PROCEDURE UNLIST  10/01/2019    [place] INS NEW/RPLCMT PRM PM W/TRANSV ELTRD ATRIAL&VENT Left 10/1/2019    INSERT PPM DUAL performed by Verner Safe, MD at [facility] CATH LAB     Current Outpatient Medications   Medication Sig    alendronate (Fosamax) 70 mg tablet Take 35 mg by mouth.  mv-mn/folic ac/calcium/vit K1 (WOMEN'S 50 PLUS MULTIVITAMIN PO) Take  by mouth.  omega 3-DHA-EPA-fish oil 1,000 mg (120 mg-180 mg) capsule Take 1 Capsule by mouth daily.  cyanocobalamin 1,000 mcg tablet Take 1,000 mcg by mouth daily.  traMADoL (ULTRAM) 50 mg tablet TAKE 1 TABLET BY MOUTH EVERY 8 HOURS FOR 7 DAYS AS NEEDED    metoprolol tartrate (LOPRESSOR) 100 mg IR tablet Take 1 Tablet by mouth two (2) times a day.  Simbrinza 1-0.2 % drps     cycloSPORINE (Restasis) 0.05 % dpet Administer 1 Drop to both eyes every twelve (12) hours.  bimatoprost (LUMIGAN) 0.01 % ophthalmic drops Administer 1 Drop to both eyes every evening.  rivaroxaban (Xarelto) 20 mg tab tablet Take 1 Tab by mouth daily (with breakfast).  rosuvastatin (CRESTOR) 10 mg tablet     LEVOXYL 137 mcg tablet     RHOPRESSA 0.02 % drop     methylPREDNISolone (MEDROL DOSEPACK) 4 mg tablet FOLLOW PACKAGE DIRECTIONS (Patient not taking: Reported on 7/15/2021)     No current facility-administered medications for this visit. Allergies   Allergen Reactions    Sulfur Other (comments)     reflux     Social History     Occupational History    Not on file   Tobacco Use    Smoking status: Never Smoker    Smokeless tobacco: Never Used   Vaping Use    Vaping Use: Never used   Substance and Sexual Activity    Alcohol use: Never    Drug use: Never    Sexual activity: Not on file     History reviewed. No pertinent family history.      DIAGNOSTIC LAB DATA      Lab Results   Component Value Date/Time    Hemoglobin A1c 5.5 01/21/2021 02:04 PM    //   Lab Results   Component Value Date/Time    Glucose 107 (H) 06/08/2021 11:28 PM        No results found for: [id number], [id number]      Lab Results   Component Value Date/Time    Vitamin D 25-Hydroxy 28.2 (L) 01/21/2021 02:02 PM         REVIEW OF SYSTEMS : 8/9/2021  ALL BELOW ARE Negative except : SEE HPI All other systems reviewed and are negative. 12 point review of systems otherwise negative unless noted in HPI. DIAGNOSTIC IMAGING /ORDERS       Orders Placed This Encounter    [[id number]] Ankle 2V     Order Specific Question:   Weight bearing? Answer:   No        X-rays, 3 views, of the left ankle,: Nonweightbearing: Healing left distal fibula, transverse fracture left distal fibula is appreciated. Normal ankle alignment normal mortise. There are some generalized osteopenia, no subluxation dislocation. I have reviewed the results of the above study. The interpretation of this study is my professional opinion. On this date 08/09/2021 I have spent 20 minutes reviewing previous notes, test results and face to face with the patient discussing the diagnosis and importance of compliance with the treatment plan as well as documenting on the day of the visit. An electronic signature was used to authenticate this note. Disclaimer: Sections of this note are dictated using utilizing voice recognition software, which may have resulted in some phonetic based errors in grammar and contents. Even though attempts were made to correct all the mistakes, some may have been missed, and remained in the body of the document. If questions arise, please contact our department. Khushbu Jean Baptiste may have a reminder for a \"due or due soon\" health maintenance. I have asked that she contact her primary care provider for follow-up on this health maintenance.     Armando Alamo, as dictated by  Mateusz Bhatti MD  8/9/2021  7:40 AM

## 2021-08-09 ENCOUNTER — OFFICE VISIT (OUTPATIENT)
Dept: ORTHOPEDIC SURGERY | Age: 86
End: 2021-08-09
Payer: MEDICARE

## 2021-08-09 VITALS
HEART RATE: 60 BPM | WEIGHT: 153 LBS | BODY MASS INDEX: 25.49 KG/M2 | OXYGEN SATURATION: 98 % | TEMPERATURE: 98.6 F | HEIGHT: 65 IN

## 2021-08-09 DIAGNOSIS — S82.65XA CLOSED NONDISPLACED FRACTURE OF LATERAL MALLEOLUS OF LEFT FIBULA, INITIAL ENCOUNTER: Primary | ICD-10-CM

## 2021-08-09 DIAGNOSIS — M25.572 ACUTE LEFT ANKLE PAIN: ICD-10-CM

## 2021-08-09 PROCEDURE — 73600 X-RAY EXAM OF ANKLE: CPT | Performed by: ORTHOPAEDIC SURGERY

## 2021-08-09 PROCEDURE — 99024 POSTOP FOLLOW-UP VISIT: CPT | Performed by: ORTHOPAEDIC SURGERY

## 2021-09-07 NOTE — PROGRESS NOTES
AMBULATORY PROGRESS NOTE      Patient: Behzad Andino             MRN: [id number]     SSN: [id number] Body mass index is 25.79 kg/m². YOB: 1935     AGE: 82 y.o. EX: female    PCP: Jv Donaldson MD       IMPRESSION //  DIAGNOSIS AND TREATMENT PLAN        Behzad Andino has a diagnosis of:    She has minimal tenderness to deep palpation to the left ankle due to her healing left ankle fracture, but arrives her WBAT without a brace. DIAGNOSES    1. Closed nondisplaced fracture of lateral malleolus of left fibula with delayed healing, subsequent encounter        Orders Placed This Encounter    AMB POC XRAY, ANKLE; COMPLETE, 3+ VIE     ASK ALL FEMALE PATIENTS IF THEY ARE PREGNANT     Order Specific Question:   Reason for Exam     Answer:   PAIN        PLAN:    1. Obtained 3-View XR of right ankle    2. Obtain 3-View XR of right ankle at next office visit. RTO-  1 month    Behzad Andino  expresses understanding of the diagnosis, treatment plan, and all of their proposed questions were answered to their satisfaction. Patient education has been provided re the diagnoses. HPI //  [address] Marc[address] Streeter IS A 82 y.o. female who is a/an  established patient, presenting to my outpatient office for evaluation of  the following chief complaint(s):     Chief Complaint   Patient presents with    Ankle Pain     left follow up     DOI: 7/12/2021    At LOV pt to obtained 2-View XR of left ankle. Continue to wear left AS/AC brace. Please obtain 3-View XR of left ankle. Since LOV patient presents today w/ left ankle swelling. She states she still has minor pain occasionally. Visit Vitals  Pulse 78   Temp 97.7 °F (36.5 °C) (Skin)   Ht 5' 5\" (1.651 m)   Wt 155 lb (70.3 kg)   SpO2 98%   BMI 25.79 kg/m²       Appearance: Alert, well appearing and pleasant patient who is in no distress, oriented to person, place/time, and who follows commands. This patient is accompanied in the examination room by her  self. There is signs of: no dementia  Psychiatric: Affect/mood are appropriate. Speech normal in context and clarity, memory intact grossly, no involuntary movements - tremors. Patient arrives to office via: without assistive device:   H EENT (2): Head normocephalic & atraumatic. Eye: pupils are round// EOM are intact // Neck: ROM WNL  // Hearings Intact   Respiratory: Breathing non labored     ANKLE/FOOT left    Gait: slow, no limp. Tenderness: mild    distal fibula only to deep palpation. Cutaneous: mild swelling of her right foot  Joint Motion:   WNL  Joint / Tendon Stability: No Ankle or Subtalar instability or joint laxity. No peroneal sublux ability or dislocation  Alignment: neutral Hindfoot,    Neuro Motor/Sensory: NL/NL  Vascular: NL foot/ankle pulses,   Lymphatics: No extremity lymphedema, No calf swelling, no tenderness to calf muscles. CHART REVIEW     Nathan Howard has been experiencing pain and discomfort confirmed as outlined in the pain assessment outlined below.  was reviewed by Pratik Garrido MD on 9/8/2021. Pain Assessment  9/8/2021   Location of Pain -   Location Modifiers Left   Severity of Pain 0   Quality of Pain -   Duration of Pain -   Frequency of Pain -   Date Pain First Started -   Aggravating Factors -   Limiting Behavior -   Relieving Factors -   Relieving Factors Comment -   Result of Injury -   Work-Related Injury -   Type of Injury -   Type of Injury Comment -        Nathan Howard  has a past medical history of Atrial fibrillation ([address] Left ankle pain, and Osteoporosis.      Patients is employed at:         Past Medical History:   Diagnosis Date    Atrial fibrillation ([address]     Left ankle pain     Osteoporosis      Past Surgical History:   Procedure Laterality Date    [place] CARDIAC SURG PROCEDURE UNLIST  10/01/2019    MT INS NEW/RPLCMT PRM PM W/TRANSV ELTRD ATRIAL&VENT Left 10/1/2019 INSERT PPM DUAL performed by Marleni Jaffe MD at [facility] LAB     Current Outpatient Medications   Medication Sig    alendronate (Fosamax) 70 mg tablet Take 35 mg by mouth.  mv-mn/folic ac/calcium/vit K1 (WOMEN'S 50 PLUS MULTIVITAMIN PO) Take  by mouth.  omega 3-DHA-EPA-fish oil 1,000 mg (120 mg-180 mg) capsule Take 1 Capsule by mouth daily.  cyanocobalamin 1,000 mcg tablet Take 1,000 mcg by mouth daily.  traMADoL (ULTRAM) 50 mg tablet TAKE 1 TABLET BY MOUTH EVERY 8 HOURS FOR 7 DAYS AS NEEDED    metoprolol tartrate (LOPRESSOR) 100 mg IR tablet Take 1 Tablet by mouth two (2) times a day.  Simbrinza 1-0.2 % drps     cycloSPORINE (Restasis) 0.05 % dpet Administer 1 Drop to both eyes every twelve (12) hours.  bimatoprost (LUMIGAN) 0.01 % ophthalmic drops Administer 1 Drop to both eyes every evening.  rivaroxaban (Xarelto) 20 mg tab tablet Take 1 Tab by mouth daily (with breakfast).  rosuvastatin (CRESTOR) 10 mg tablet     LEVOXYL 137 mcg tablet     RHOPRESSA 0.02 % drop     methylPREDNISolone (MEDROL DOSEPACK) 4 mg tablet FOLLOW PACKAGE DIRECTIONS (Patient not taking: Reported on 7/15/2021)     No current facility-administered medications for this visit. Allergies   Allergen Reactions    Sulfur Other (comments)     reflux     Social History     Occupational History    Not on file   Tobacco Use    Smoking status: Never Smoker    Smokeless tobacco: Never Used   Vaping Use    Vaping Use: Never used   Substance and Sexual Activity    Alcohol use: Never    Drug use: Never    Sexual activity: Not on file     History reviewed. No pertinent family history.      DIAGNOSTIC LAB DATA      Lab Results   Component Value Date/Time    Hemoglobin A1c 5.5 01/21/2021 02:04 PM    //   Lab Results   Component Value Date/Time    Glucose 107 (H) 06/08/2021 11:28 PM        No results found for: [id number], VCT0NQST      Lab Results   Component Value Date/Time    Vitamin D 25-Hydroxy 28.2 (L) 01/21/2021 02:02 PM         REVIEW OF SYSTEMS : 9/8/2021  ALL BELOW ARE Negative except : SEE HPI     All other systems reviewed and are negative. 12 point review of systems otherwise negative unless noted in HPI. DIAGNOSTIC IMAGING /ORDERS       Orders Placed This Encounter    AMB POC XRAY, ANKLE; COMPLETE, 3+ VIE     ASK ALL FEMALE PATIENTS IF THEY ARE PREGNANT     Order Specific Question:   Reason for Exam     Answer:   PAIN      ANKLE X RAYS 3 VIEWS LEFT  X RAYS AT [address] [place] [address]  9/8/2021    NON WEIGHT BEARING    X RAYS AT [address] [place] [address]  9/8/2021    Bones: Healing fracture left fibula: There are no dislocations. No focal osteolytic or osteoblastic process     Bone Spurs: No significant bone spurs  Alignment: Ankle mortise alignment is congruent, Tibial plafond and talar dome intact. No Osteochondral defects seen   Joint: No Significant OA changes present  Soft Tissues: Normal, No radiopaque foreign body     No abnormal calcific densities to soft tissues    No ankle joint effusion in lateral projection. Mineralization: Suggests no Osteopenia    I have personally reviewed the results of the above study. The interpretation of this study is my professional opinion     I have reviewed the results of the above study. The interpretation of this study is my professional opinion. On this date 09/08/2021 I have spent 30 minutes reviewing previous notes, test results and face to face with the patient discussing the diagnosis and importance of compliance with the treatment plan as well as documenting on the day of the visit. An electronic signature was used to authenticate this note. Disclaimer: Sections of this note are dictated using utilizing voice recognition software, which may have resulted in some phonetic based errors in grammar and contents.  Even though attempts were made to correct all the mistakes, some may have been missed, and remained in the body of the document. If questions arise, please contact our department. Shannon Jennings may have a reminder for a \"due or due soon\" health maintenance. I have asked that she contact her primary care provider for follow-up on this health maintenance.     Katey Gomez, as dictated by  Italo Maldonado MD  9/8/2021  8:56 AM

## 2021-09-08 ENCOUNTER — OFFICE VISIT (OUTPATIENT)
Dept: ORTHOPEDIC SURGERY | Age: 86
End: 2021-09-08
Payer: MEDICARE

## 2021-09-08 VITALS
WEIGHT: 155 LBS | BODY MASS INDEX: 25.83 KG/M2 | HEIGHT: 65 IN | TEMPERATURE: 97.7 F | HEART RATE: 78 BPM | OXYGEN SATURATION: 98 %

## 2021-09-08 DIAGNOSIS — S82.65XG CLOSED NONDISPLACED FRACTURE OF LATERAL MALLEOLUS OF LEFT FIBULA WITH DELAYED HEALING, SUBSEQUENT ENCOUNTER: Primary | ICD-10-CM

## 2021-09-08 PROCEDURE — 99024 POSTOP FOLLOW-UP VISIT: CPT | Performed by: ORTHOPAEDIC SURGERY

## 2021-09-08 PROCEDURE — 73610 X-RAY EXAM OF ANKLE: CPT | Performed by: ORTHOPAEDIC SURGERY

## 2021-10-13 NOTE — PROGRESS NOTES
AMBULATORY PROGRESS NOTE      Patient: Elsi Murillo             MRN: [id number]     SSN: [id number] There is no height or weight on file to calculate BMI. YOB: 1935     AGE: 82 y.o. EX: female    PCP: Steffi Quarles MD       IMPRESSION //  DIAGNOSIS AND TREATMENT PLAN        Elsi Murillo has a diagnosis of:      DIAGNOSES    No diagnosis found. No orders of the defined types were placed in this encounter. PLAN:    1. ***  2. ***      RTO-  ***    Mercy Lies  expresses understanding of the diagnosis, treatment plan, and all of their proposed questions were answered to their satisfaction. Patient education has been provided re the diagnoses. HPI //  [address] Marcio Streeter IS A 82 y.o. female who is a/an  established patient, presenting to my outpatient office for evaluation of  the following chief complaint(s):     No chief complaint on file. DOI: 7/12/2021    At [facility] pt presented w/ left ankle pain. Obtained 3-View XR of right ankle. Obtain 3-View XR of right ankle at next office visit.     Since LOV***    There were no vitals taken for this visit. Appearance: Alert, well appearing and pleasant patient who is in no distress, oriented to person, place/time, and who follows commands. This patient is accompanied in the examination room by her  {:[id number]}. There is signs of: {DEMENTIA:54970076}  Psychiatric: Affect/mood are appropriate. Speech normal in context and clarity, memory intact grossly, no involuntary movements - tremors. Patient arrives to office via: {With-without:73068} assistive device: ***  H EENT (2): Head normocephalic & atraumatic. Eye: pupils are round// EOM are intact // Neck: ROM WNL  // Hearings Intact   Respiratory: Breathing non labored     ANKLE/FOOT {LEFT/RIGHT/BILATERAL:19324}    Gait: {gait:22525}  Tenderness: {Mild-Mod-Sev:68620580} ***  {foot ankle tenderness:11509}  Cutaneous: *** WNL.   Joint Motion: *** WNL //  {Exam; ankle abnormals:49484}   Joint / Tendon Stability: No Ankle or Subtalar instability or joint laxity. No peroneal sublux ability or dislocation  Alignment: {Neutral/varus:90834} Hindfoot,    Neuro Motor/Sensory: NL/NL  Vascular: NL foot/ankle pulses,   Lymphatics: No extremity lymphedema, No calf swelling, no tenderness to calf muscles. CHART REVIEW     Angelica Dumas has been experiencing pain and discomfort confirmed as outlined in the pain assessment outlined below.  was reviewed by Mitchel Lay MD on 10/13/2021. Pain Assessment  9/8/2021   Location of Pain -   Location Modifiers Left   Severity of Pain 0   Quality of Pain -   Duration of Pain -   Frequency of Pain -   Date Pain First Started -   Aggravating Factors -   Limiting Behavior -   Relieving Factors -   Relieving Factors Comment -   Result of Injury -   Work-Related Injury -   Type of Injury -   Type of Injury Comment -        Angelica Dumas  has a past medical history of Atrial fibrillation ([address] Left ankle pain, and Osteoporosis. Patients is employed at:         Past Medical History:   Diagnosis Date    Atrial fibrillation ([address]     Left ankle pain     Osteoporosis      Past Surgical History:   Procedure Laterality Date    [place] CARDIAC SURG PROCEDURE UNLIST  10/01/2019    OR INS NEW/RPLCMT PRM PM W/TRANSV ELTRD ATRIAL&VENT Left 10/1/2019    INSERT PPM DUAL performed by Antonietta Lua MD at [facility] CATH LAB     Current Outpatient Medications   Medication Sig    alendronate (Fosamax) 70 mg tablet Take 35 mg by mouth.  mv-mn/folic ac/calcium/vit K1 (WOMEN'S 50 PLUS MULTIVITAMIN PO) Take  by mouth.  omega 3-DHA-EPA-fish oil 1,000 mg (120 mg-180 mg) capsule Take 1 Capsule by mouth daily.  cyanocobalamin 1,000 mcg tablet Take 1,000 mcg by mouth daily.     traMADoL (ULTRAM) 50 mg tablet TAKE 1 TABLET BY MOUTH EVERY 8 HOURS FOR 7 DAYS AS NEEDED    methylPREDNISolone (MEDROL DOSEPACK) 4 mg tablet FOLLOW PACKAGE DIRECTIONS (Patient not taking: Reported on 7/15/2021)    metoprolol tartrate (LOPRESSOR) 100 mg IR tablet Take 1 Tablet by mouth two (2) times a day.  Simbrinza 1-0.2 % drps     cycloSPORINE (Restasis) 0.05 % dpet Administer 1 Drop to both eyes every twelve (12) hours.  bimatoprost (LUMIGAN) 0.01 % ophthalmic drops Administer 1 Drop to both eyes every evening.  rivaroxaban (Xarelto) 20 mg tab tablet Take 1 Tab by mouth daily (with breakfast).  rosuvastatin (CRESTOR) 10 mg tablet     LEVOXYL 137 mcg tablet     RHOPRESSA 0.02 % drop      No current facility-administered medications for this visit. Allergies   Allergen Reactions    Sulfur Other (comments)     reflux     Social History     Occupational History    Not on file   Tobacco Use    Smoking status: Never Smoker    Smokeless tobacco: Never Used   Vaping Use    Vaping Use: Never used   Substance and Sexual Activity    Alcohol use: Never    Drug use: Never    Sexual activity: Not on file     No family history on file. DIAGNOSTIC LAB DATA      Lab Results   Component Value Date/Time    Hemoglobin A1c 5.5 01/21/2021 02:04 PM    //   Lab Results   Component Value Date/Time    Glucose 107 (H) 06/08/2021 11:28 PM        No results found for: [id number], TCS3ECLU      Lab Results   Component Value Date/Time    Vitamin D 25-Hydroxy 28.2 (L) 01/21/2021 02:02 PM        Drug Screen Most Recent Result Date    No resulted procedures found. REVIEW OF SYSTEMS : 10/13/2021  ALL BELOW ARE Negative except : SEE HPI     All other systems reviewed and are negative. 12 point review of systems otherwise negative unless noted in HPI. DIAGNOSTIC IMAGING /ORDERS       No orders of the defined types were placed in this encounter. ***      I have reviewed the results of the above study. The interpretation of this study is my professional opinion.             {Time Documentation Optional:57588}    An electronic signature was used to authenticate this note. Disclaimer: Sections of this note are dictated using utilizing voice recognition software, which may have resulted in some phonetic based errors in grammar and contents. Even though attempts were made to correct all the mistakes, some may have been missed, and remained in the body of the document. If questions arise, please contact our department. Shayne King may have a reminder for a \"due or due soon\" health maintenance. I have asked that she contact her primary care provider for follow-up on this health maintenance. Serg Merrill, as dictated by, Carrol Raman.   10/13/2021  1:15 PM

## 2021-10-18 ENCOUNTER — OFFICE VISIT (OUTPATIENT)
Dept: ORTHOPEDIC SURGERY | Age: 86
End: 2021-10-18

## 2021-10-18 VITALS — HEART RATE: 88 BPM | BODY MASS INDEX: 25.83 KG/M2 | WEIGHT: 155 LBS | OXYGEN SATURATION: 99 % | HEIGHT: 65 IN

## 2021-10-22 ENCOUNTER — OFFICE VISIT (OUTPATIENT)
Dept: CARDIOLOGY CLINIC | Age: 86
End: 2021-10-22
Payer: MEDICARE

## 2021-10-22 VITALS
SYSTOLIC BLOOD PRESSURE: 138 MMHG | DIASTOLIC BLOOD PRESSURE: 82 MMHG | WEIGHT: 154 LBS | HEIGHT: 65 IN | OXYGEN SATURATION: 97 % | HEART RATE: 91 BPM | BODY MASS INDEX: 25.66 KG/M2

## 2021-10-22 DIAGNOSIS — R55 NEUROCARDIOGENIC SYNCOPE: ICD-10-CM

## 2021-10-22 DIAGNOSIS — Z95.0 PACEMAKER: ICD-10-CM

## 2021-10-22 DIAGNOSIS — I49.5 TACHYCARDIA-BRADYCARDIA SYNDROME (HCC): Primary | ICD-10-CM

## 2021-10-22 PROCEDURE — 1101F PT FALLS ASSESS-DOCD LE1/YR: CPT | Performed by: INTERNAL MEDICINE

## 2021-10-22 PROCEDURE — G8419 CALC BMI OUT NRM PARAM NOF/U: HCPCS | Performed by: INTERNAL MEDICINE

## 2021-10-22 PROCEDURE — G8427 DOCREV CUR MEDS BY ELIG CLIN: HCPCS | Performed by: INTERNAL MEDICINE

## 2021-10-22 PROCEDURE — 1090F PRES/ABSN URINE INCON ASSESS: CPT | Performed by: INTERNAL MEDICINE

## 2021-10-22 PROCEDURE — G8400 PT W/DXA NO RESULTS DOC: HCPCS | Performed by: INTERNAL MEDICINE

## 2021-10-22 PROCEDURE — G8536 NO DOC ELDER MAL SCRN: HCPCS | Performed by: INTERNAL MEDICINE

## 2021-10-22 PROCEDURE — G8510 SCR DEP NEG, NO PLAN REQD: HCPCS | Performed by: INTERNAL MEDICINE

## 2021-10-22 PROCEDURE — 99215 OFFICE O/P EST HI 40 MIN: CPT | Performed by: INTERNAL MEDICINE

## 2021-10-22 RX ORDER — FAMOTIDINE 20 MG/1
20 TABLET, FILM COATED ORAL DAILY
COMMUNITY
Start: 2021-06-09

## 2021-10-22 NOTE — PROGRESS NOTES
Gracy Ramos presents today for   Chief Complaint   Patient presents with    Follow-up     6 month follow up       Gracy Ramos preferred language for health care discussion is english/other. Is someone accompanying this pt? yes    Is the patient using any DME equipment during [address]? no    Depression Screening:  3 most recent PHQ Screens 10/22/2021   Little interest or pleasure in doing things Not at all   Feeling down, depressed, irritable, or hopeless Not at all   Total Score PHQ 2 0       Learning Assessment:  Learning Assessment 10/22/2021   PRIMARY LEARNER Patient   BARRIERS PRIMARY LEARNER -   CO-LEARNER CAREGIVER -   PRIMARY LANGUAGE ENGLISH   LEARNER PREFERENCE PRIMARY DEMONSTRATION   ANSWERED BY patient   RELATIONSHIP SELF       Abuse Screening:  Abuse Screening Questionnaire 10/22/2021   Do you ever feel afraid of your partner? N   Are you in a relationship with someone who physically or mentally threatens you? N   Is it safe for you to go home? Y       Fall Risk  Fall Risk Assessment, last 12 mths 10/22/2021   Able to walk? Yes   Fall in past 12 months? 0   Do you feel unsteady? 0   Are you worried about falling 0   Is TUG test greater than 12 seconds? 0   Is the gait abnormal? 0   Fall with injury? 0           Pt currently taking Anticoagulant therapy? Xarelto 20 mg once a day    Pt currently taking Antiplatelet therapy ? no      Coordination of Care:  1. Have you been to the ER, urgent care clinic since your last visit? Hospitalized since your last visit? no    2. Have you seen or consulted any other health care providers outside of the [address] [place] [address] since your last visit? Include any pap smears or colon screening.  no

## 2021-10-26 ENCOUNTER — OFFICE VISIT (OUTPATIENT)
Dept: ORTHOPEDIC SURGERY | Age: 86
End: 2021-10-26
Payer: MEDICARE

## 2021-10-26 VITALS
WEIGHT: 152 LBS | OXYGEN SATURATION: 98 % | HEART RATE: 75 BPM | BODY MASS INDEX: 25.33 KG/M2 | HEIGHT: 65 IN | TEMPERATURE: 97.1 F

## 2021-10-26 DIAGNOSIS — S93.491A SPRAIN OF ANTERIOR TALOFIBULAR LIGAMENT OF RIGHT ANKLE, INITIAL ENCOUNTER: ICD-10-CM

## 2021-10-26 DIAGNOSIS — S82.65XG CLOSED NONDISPLACED FRACTURE OF LATERAL MALLEOLUS OF LEFT FIBULA WITH DELAYED HEALING, SUBSEQUENT ENCOUNTER: Primary | ICD-10-CM

## 2021-10-26 PROCEDURE — G8427 DOCREV CUR MEDS BY ELIG CLIN: HCPCS | Performed by: ORTHOPAEDIC SURGERY

## 2021-10-26 PROCEDURE — 73610 X-RAY EXAM OF ANKLE: CPT | Performed by: ORTHOPAEDIC SURGERY

## 2021-10-26 PROCEDURE — G8419 CALC BMI OUT NRM PARAM NOF/U: HCPCS | Performed by: ORTHOPAEDIC SURGERY

## 2021-10-26 PROCEDURE — G8432 DEP SCR NOT DOC, RNG: HCPCS | Performed by: ORTHOPAEDIC SURGERY

## 2021-10-26 PROCEDURE — 99213 OFFICE O/P EST LOW 20 MIN: CPT | Performed by: ORTHOPAEDIC SURGERY

## 2021-10-26 PROCEDURE — 1090F PRES/ABSN URINE INCON ASSESS: CPT | Performed by: ORTHOPAEDIC SURGERY

## 2021-10-26 PROCEDURE — G8400 PT W/DXA NO RESULTS DOC: HCPCS | Performed by: ORTHOPAEDIC SURGERY

## 2021-10-26 PROCEDURE — G8536 NO DOC ELDER MAL SCRN: HCPCS | Performed by: ORTHOPAEDIC SURGERY

## 2021-10-26 PROCEDURE — 1101F PT FALLS ASSESS-DOCD LE1/YR: CPT | Performed by: ORTHOPAEDIC SURGERY

## 2021-10-26 RX ORDER — OXYCODONE AND ACETAMINOPHEN 5; 325 MG/1; MG/1
1 TABLET ORAL
Qty: 15 TABLET | Refills: 0 | Status: SHIPPED | OUTPATIENT
Start: 2021-10-26 | End: 2021-10-26 | Stop reason: CLARIF

## 2021-10-26 NOTE — PROGRESS NOTES
AMBULATORY PROGRESS NOTE      Patient: Meghna Webber             MRN: [id number]     SSN: [id number] Body mass index is 25.29 kg/m². YOB: 1935     AGE: 82 y.o. EX: female    PCP: Kimberli Moore MD       IMPRESSION //  DIAGNOSIS AND TREATMENT PLAN        Meghna Webber has a diagnosis of:      She is out of the global period, having sustained a left fibula fracture, July 12, 2021. She is doing quite well reports no major pain discomfort, to her left ankle. DIAGNOSES    1. Closed nondisplaced fracture of lateral malleolus of left fibula with delayed healing, subsequent encounter    2. Sprain of anterior talofibular ligament of right ankle, initial encounter        Orders Placed This Encounter    AMB POC XRAY, ANKLE; COMPLETE, 3+ VIE     ASK ALL FEMALE PATIENTS IF THEY ARE PREGNANT     Order Specific Question:   Reason for Exam     Answer:   PAIN    DISCONTD: oxyCODONE-acetaminophen (Percocet) 5-325 mg per tablet     Sig: Take 1 Tablet by mouth every eight (8) hours as needed for Pain for up to 5 days. Max Daily Amount: 3 Tablets. Dispense:  15 Tablet     Refill:  0            PLAN:    1. Obtain 3-View XR of right ankle    RTO: PRN    There are no Patient Instructions on file for this visit. Please follow up with your PCP for any health maintenance as recommended         Meghna Webber  expresses understanding of the diagnosis, treatment plan, and all of their proposed questions were answered to their satisfaction. Patient education has been provided re the diagnoses. HPI //  [facility] Chuck Streeter IS A 82 y.o. female who is a/an  established patient, presenting to my outpatient office for evaluation of  the following chief complaint(s):     Chief Complaint   Patient presents with    Foot Pain     left foot   DOI: 7/12/2021    At [address] [facility] [address] right ankle pain. Obtained 3-View XR of right ankle.  Obtain 3-View XR of right ankle at next office visit. Since Yolanda Merino states she has left foot pain. She states the pain is minimal. She takes Xarelto. Visit Vitals  Pulse 75   Temp 97.1 °F (36.2 °C) (Temporal)   Ht 5' 5\" (1.651 m)   Wt 152 lb (68.9 kg)   SpO2 98%   BMI 25.29 kg/m²       Appearance: Alert, well appearing and pleasant patient who is in no distress, oriented to person, place/time, and who follows commands. Normal dress/motor activity/thought processes/memory. This patient is accompanied in the examination room by her  self. There is signs of: no dementia  Patient arrives to office via: with assistive device: ankle sleeve  Psychiatric:  Normal Affect/mood. Judgement, behavior, and conduct are appropriate. Speech normal in context and clarity, memory intact grossly, no involuntary movements - tremors. H EENT (2): Head normocephalic & atraumatic. Eye: pupils are round// EOM are intact // Neck: ROM WNL  // Hearings Intact   Respiratory: Breathing non labored     ANKLE/FOOT left    Gait: normal  Tenderness: no    To the ankle, fibula, medial malleolus, syndesmosis regions. The proximal fibula remains nontender as well. Cutaneous:   WNL. Joint Motion:   WNL //    Joint / Tendon Stability: No Ankle or Subtalar instability or joint laxity. No peroneal sublux ability or dislocation  Alignment: neutral Hindfoot,    Neuro Motor/Sensory: NL/NL  Vascular: NL foot/ankle pulses,   Lymphatics: No extremity lymphedema, No calf swelling, no tenderness to calf muscles. CHART REVIEW     Azul Maurice has been experiencing pain and discomfort confirmed as outlined in the pain assessment outlined below.  was reviewed by Matheus Melo MD on 10/26/2021.      Pain Assessment  10/26/2021   Location of Pain Foot   Location Modifiers Left   Severity of Pain 0   Quality of Pain -   Duration of Pain -   Frequency of Pain -   Date Pain First Started -   Aggravating Factors -   Limiting Behavior - Relieving Factors -   Relieving Factors Comment -   Result of Injury -   Work-Related Injury -   Type of Injury -   Type of Injury Comment -        Luis Carranza  has a past medical history of Atrial fibrillation ([address] Left ankle pain, and Osteoporosis. Patients is employed at:         Past Medical History:   Diagnosis Date    Atrial fibrillation ([address]     Left ankle pain     Osteoporosis      Past Surgical History:   Procedure Laterality Date    AL CARDIAC SURG PROCEDURE UNLIST  10/01/2019    AL INS NEW/RPLCMT PRM PM W/TRANSV ELTRD ATRIAL&VENT Left 10/1/2019    INSERT PPM DUAL performed by Brenden Lorenzo MD at [facility] CATH LAB     Current Outpatient Medications   Medication Sig    famotidine (PEPCID) 20 mg tablet Take 20 mg by mouth daily.  vit A/vit C/vit E/zinc/copper (EYE MULTIVITAMIN PO) Take  by mouth daily.  alendronate (Fosamax) 70 mg tablet Take 35 mg by mouth.  mv-mn/folic ac/calcium/vit K1 (WOMEN'S 50 PLUS MULTIVITAMIN PO) Take  by mouth.  cyanocobalamin 1,000 mcg tablet Take 1,000 mcg by mouth daily.  metoprolol tartrate (LOPRESSOR) 100 mg IR tablet Take 1 Tablet by mouth two (2) times a day.  Simbrinza 1-0.2 % drps daily.  cycloSPORINE (Restasis) 0.05 % dpet Administer 1 Drop to both eyes every twelve (12) hours.  bimatoprost (LUMIGAN) 0.01 % ophthalmic drops Administer 1 Drop to both eyes every evening.  rivaroxaban (Xarelto) 20 mg tab tablet Take 1 Tab by mouth daily (with breakfast).  rosuvastatin (CRESTOR) 10 mg tablet Take 10 mg by mouth daily.  LEVOXYL 137 mcg tablet Take 137 mcg by mouth daily.  RHOPRESSA 0.02 % drop      No current facility-administered medications for this visit.      Allergies   Allergen Reactions    Sulfur Other (comments)     reflux     Social History     Occupational History    Not on file   Tobacco Use    Smoking status: Never Smoker    Smokeless tobacco: Never Used   Vaping Use    Vaping Use: Never used Substance and Sexual Activity    Alcohol use: Never    Drug use: Never    Sexual activity: Not on file     History reviewed. No pertinent family history. DIAGNOSTIC LAB DATA      Lab Results   Component Value Date/Time    Hemoglobin A1c 5.5 01/21/2021 02:04 PM    //   Lab Results   Component Value Date/Time    Glucose 107 (H) 06/08/2021 11:28 PM        No results found for: [id number], [id number]      Lab Results   Component Value Date/Time    Vitamin D 25-Hydroxy 28.2 (L) 01/21/2021 02:02 PM        Drug Screen Most Recent Result Date    No resulted procedures found. REVIEW OF SYSTEMS : 10/26/2021  ALL BELOW ARE Negative except : SEE HPI     All other systems reviewed and are negative. 12 point review of systems otherwise negative unless noted in HPI. RADIOGRAPHS// IMAGING//DIAGNOSTIC DATA       Left ankle 3 views x-rays,  AP/LAT/OBL completed 10/26/2021 AT [facility]    X-rays reveal healed distal fibula is present left: There are no no acute fracture, dislocation or subluxation noted. Overall alignment is   acceptable. Soft tissue swelling is not noted. No osteolytic or osteoblastic lesions noted. Mineralization suggests no osteopenia. Degenerative changes are no noted. Calcified vessels are none present. I have personally reviewed the images of the above study. The interpretation of this study is my professional opinion            On this date 10/26/2021 I have spent 25 minutes reviewing previous notes, test results and face to face with the patient discussing the diagnosis and importance of compliance with the treatment plan as well as documenting on the day of the visit. Disclaimer:     Sections of this note are dictated using utilizing voice recognition software, which may have resulted in some phonetic based errors in grammar and contents.  Even though attempts were made to correct all the mistakes, some may have been missed, and remained in the body of the document. If questions arise, please contact our department. An electronic signature was used to authenticate this note. Kristie President may have a reminder for a \"due or due soon\" health maintenance. I have asked that she contact her primary care provider for follow-up on this health maintenance. There are no Patient Instructions on file for this visit. Please follow up with your PCP for any health maintenance as recommended         Krish Ashley, as dictated by, Josué Christie.   10/26/2021  8:37 AM

## 2022-01-12 RX ORDER — RIVAROXABAN 20 MG/1
TABLET, FILM COATED ORAL
Qty: 90 TABLET | Refills: 3 | Status: SHIPPED | OUTPATIENT
Start: 2022-01-12

## 2022-01-29 ENCOUNTER — TRANSCRIBE ORDER (OUTPATIENT)
Dept: SCHEDULING | Age: 87
End: 2022-01-29

## 2022-01-29 DIAGNOSIS — R19.00 INTRA-ABDOMINAL AND PELVIC SWELLING, MASS AND LUMP, UNSPECIFIED SITE: ICD-10-CM

## 2022-01-29 DIAGNOSIS — R31.9 HEMATURIA, UNSPECIFIED: Primary | ICD-10-CM

## 2022-02-10 ENCOUNTER — HOSPITAL ENCOUNTER (OUTPATIENT)
Dept: CT IMAGING | Age: 87
Discharge: HOME | End: 2022-02-10
Attending: INTERNAL MEDICINE
Payer: MEDICARE

## 2022-02-10 DIAGNOSIS — R19.00 INTRA-ABDOMINAL AND PELVIC SWELLING, MASS AND LUMP, UNSPECIFIED SITE: ICD-10-CM

## 2022-02-10 DIAGNOSIS — R31.9 HEMATURIA, UNSPECIFIED: ICD-10-CM

## 2022-02-10 LAB — CREAT UR-MCNC: 0.8 MG/DL (ref 0.6–1.3)

## 2022-02-10 PROCEDURE — 74178 CT ABD&PLV WO CNTR FLWD CNTR: CPT

## 2022-02-10 PROCEDURE — 74011000636 HC RX REV CODE- 636: Performed by: INTERNAL MEDICINE

## 2022-02-10 PROCEDURE — 82565 ASSAY OF CREATININE: CPT

## 2022-02-10 RX ADMIN — IOPAMIDOL 100 ML: 755 INJECTION, SOLUTION INTRAVENOUS at 13:03

## 2022-02-28 ENCOUNTER — TRANSCRIBE ORDER (OUTPATIENT)
Dept: SCHEDULING | Age: 87
End: 2022-02-28

## 2022-02-28 DIAGNOSIS — R93.49 ABNORMAL FINDINGS ON DIAGNOSTIC IMAGING OF URINARY ORGANS: Primary | ICD-10-CM

## 2022-03-04 ENCOUNTER — TRANSCRIBE ORDER (OUTPATIENT)
Dept: SCHEDULING | Age: 87
End: 2022-03-04

## 2022-03-04 DIAGNOSIS — N85.01 BENIGN ENDOMETRIAL HYPERPLASIA: Primary | ICD-10-CM

## 2022-03-18 PROBLEM — R55 SYNCOPE: Status: ACTIVE | Noted: 2019-10-01

## 2022-03-19 PROBLEM — R00.2 PALPITATION: Status: ACTIVE | Noted: 2020-07-22

## 2022-03-20 PROBLEM — R07.9 CHEST PAIN: Status: ACTIVE | Noted: 2021-06-09

## 2022-03-20 PROBLEM — Z95.0 PACEMAKER: Status: ACTIVE | Noted: 2019-10-01

## 2022-05-11 ENCOUNTER — CLINICAL SUPPORT (OUTPATIENT)
Dept: CARDIOLOGY CLINIC | Age: 87
End: 2022-05-11
Payer: MEDICARE

## 2022-05-11 ENCOUNTER — OFFICE VISIT (OUTPATIENT)
Dept: CARDIOLOGY CLINIC | Age: 87
End: 2022-05-11

## 2022-05-11 VITALS — BODY MASS INDEX: 25.29 KG/M2 | HEIGHT: 65 IN

## 2022-05-11 DIAGNOSIS — I49.5 TACHYCARDIA-BRADYCARDIA SYNDROME (HCC): Primary | ICD-10-CM

## 2022-05-11 DIAGNOSIS — Z95.0 PACEMAKER: ICD-10-CM

## 2022-05-11 DIAGNOSIS — R55 NEUROCARDIOGENIC SYNCOPE: ICD-10-CM

## 2022-05-11 DIAGNOSIS — I48.0 PAROXYSMAL ATRIAL FIBRILLATION (HCC): ICD-10-CM

## 2022-05-11 PROBLEM — N18.30 CHRONIC RENAL DISEASE, STAGE III (HCC): Status: ACTIVE | Noted: 2022-05-11

## 2022-05-11 PROCEDURE — G8536 NO DOC ELDER MAL SCRN: HCPCS | Performed by: INTERNAL MEDICINE

## 2022-05-11 PROCEDURE — G8400 PT W/DXA NO RESULTS DOC: HCPCS | Performed by: INTERNAL MEDICINE

## 2022-05-11 PROCEDURE — 1090F PRES/ABSN URINE INCON ASSESS: CPT | Performed by: INTERNAL MEDICINE

## 2022-05-11 PROCEDURE — G8419 CALC BMI OUT NRM PARAM NOF/U: HCPCS | Performed by: INTERNAL MEDICINE

## 2022-05-11 PROCEDURE — 93288 INTERROG EVL PM/LDLS PM IP: CPT | Performed by: INTERNAL MEDICINE

## 2022-05-11 PROCEDURE — 99214 OFFICE O/P EST MOD 30 MIN: CPT | Performed by: INTERNAL MEDICINE

## 2022-05-11 PROCEDURE — G8510 SCR DEP NEG, NO PLAN REQD: HCPCS | Performed by: INTERNAL MEDICINE

## 2022-05-11 PROCEDURE — 1101F PT FALLS ASSESS-DOCD LE1/YR: CPT | Performed by: INTERNAL MEDICINE

## 2022-05-11 PROCEDURE — G8427 DOCREV CUR MEDS BY ELIG CLIN: HCPCS | Performed by: INTERNAL MEDICINE

## 2022-05-11 RX ORDER — GLUCOSAMINE SULFATE 1500 MG
1000 POWDER IN PACKET (EA) ORAL DAILY
COMMUNITY

## 2022-05-11 NOTE — PROGRESS NOTES
Bear Trent    Chief Complaint   Patient presents with    Follow-up     6 month follow up       HPI    Bear Tretn is a 82 y. o. with SSS s/p PPM 2019, SVT, here for routine follow up. 2 hospitalizations since last saw me, and also NP appt, notes obtained and reviewed. She underwent pacemaker implantation on 10/1/19. She received the Medtronic MRI compatible pacemaker. She was found to have bradytachy arrhythmia on the 30-day event recorder. She had frequent episodes of nonsustained supraventricular tachycardia up to 169 bpm along with significant sinus bradycardia.      Interrogation of the pacemaker demonstrated atrial pacing and recurrent episodes of frequent supraventricular tachycardia. I met her after an episode of SVT (she came with her daughter s/p ER) and has really improved since I started BB. She doesn't even seem to remember how bad her symptoms were. She tells me really enjoys her yard, loves walking her dog with her  looking at the trees. Uses a walking stick for balance but no falls.     She underwent echocardiogram on 9/4/19 which demonstrated normal left ventricular systolic function with EF in the 55-60% range. There was no significant valvular pathology and no evidence of mitral valve prolapse. There was no significant pulmonary hypertension.      Past Medical History:   Diagnosis Date    Atrial fibrillation ([address]     Left ankle pain     Osteoporosis        Past Surgical History:   Procedure Laterality Date    AL CARDIAC SURG PROCEDURE UNLIST  10/01/2019    AL INS NEW/RPLCMT PRM PM W/TRANSV ELTRD ATRIAL&VENT Left 10/1/2019    INSERT PPM DUAL performed by Omari Carey MD at [facility] CATH LAB       Current Outpatient Medications   Medication Sig Dispense Refill    cholecalciferol (Vitamin D3) 25 mcg (1,000 unit) cap Take 1,000 Units by mouth daily.       Xarelto 20 mg tab tablet TAKE 1 TABLET DAILY WITH BREAKFAST 90 Tablet 3    famotidine (PEPCID) 20 mg tablet Take 20 mg by mouth daily.  vit A/vit C/vit E/zinc/copper (EYE MULTIVITAMIN PO) Take  by mouth daily.  mv-mn/folic ac/calcium/vit K1 (WOMEN'S 50 PLUS MULTIVITAMIN PO) Take  by mouth.  cyanocobalamin 1,000 mcg tablet Take 1,000 mcg by mouth daily.  metoprolol tartrate (LOPRESSOR) 100 mg IR tablet Take 1 Tablet by mouth two (2) times a day. 180 Tablet 3    Simbrinza 1-0.2 % drps Administer 1 Drop to both eyes daily.  cycloSPORINE (Restasis) 0.05 % dpet Administer 1 Drop to both eyes every twelve (12) hours.  bimatoprost (LUMIGAN) 0.01 % ophthalmic drops Administer 1 Drop to both eyes every evening.  rosuvastatin (CRESTOR) 10 mg tablet Take 10 mg by mouth daily.  LEVOXYL 137 mcg tablet Take 137 mcg by mouth daily.  RHOPRESSA 0.02 % drop Administer 1 Drop to both eyes daily. Allergies   Allergen Reactions    Sulfur Other (comments)     reflux       Social History     Socioeconomic History    Marital status:      Spouse name: Not on file    Number of children: Not on file    Years of education: Not on file    Highest education level: Not on file   Occupational History    Not on file   Tobacco Use    Smoking status: Never Smoker    Smokeless tobacco: Never Used   Vaping Use    Vaping Use: Never used   Substance and Sexual Activity    Alcohol use: Never    Drug use: Never    Sexual activity: Not on file   Other Topics Concern    Not on file   Social History Narrative    Not on file     Social Determinants of Health     Financial Resource Strain:     Difficulty of Paying Living Expenses: Not on file   Food Insecurity:     Worried About [address] in the Last Year: Not on file    [address] [facility] [facility] [address] in the Last Year: Not on file   Transportation Needs:     Lack of Transportation (Medical): Not on file    Lack of Transportation (Non-Medical):  Not on file   Physical Activity:     Days of Exercise per Week: Not on file    Minutes of Exercise per Session: Not on file   Stress:     Feeling of Stress : Not on file   Social Connections:     Frequency of Communication with Friends and Family: Not on file    Frequency of Social Gatherings with Friends and Family: Not on file    Attends [employer] Services: Not on file    Active Member of Clubs or Organizations: Not on file    Attends Club or Organization Meetings: Not on file    Marital Status: Not on file   Intimate Partner Violence:     Fear of Current or Ex-Partner: Not on file    Emotionally Abused: Not on file    Physically Abused: Not on file    Sexually Abused: Not on file   Housing Stability:     Unable to Pay for Housing in the Last Year: Not on file    Number of [place] in the Last Year: Not on file    Unstable Housing in the Last Year: Not on file    comes with daughter    FH; n/a    Review of Systems    14 pt Review of Systems is negative unless otherwise mentioned in the HPI. Wt Readings from Last 3 Encounters:   10/26/21 68.9 kg (152 lb)   10/22/21 69.9 kg (154 lb)   10/18/21 70.3 kg (155 lb)     Temp Readings from Last 3 Encounters:   10/26/21 97.1 °F (36.2 °C) (Temporal)   09/08/21 97.7 °F (36.5 °C) (Skin)   08/09/21 98.6 °F (37 °C) (Skin)     BP Readings from Last 3 Encounters:   10/22/21 138/82   07/06/21 130/82   06/09/21 (!) 163/89     Pulse Readings from Last 3 Encounters:   10/26/21 75   10/22/21 91   10/18/21 88       09/04/19   ECHO ADULT COMPLETE 09/04/2019 9/4/2019    Narrative · Left Ventricle: Normal cavity size, wall thickness and systolic function   (ejection fraction normal). Estimated left ventricular ejection fraction   is 56 - 60%. Biplane method used to measure ejection fraction. No regional   wall motion abnormality noted. Normal left ventricular strain. Age-appropriate left ventricular diastolic function. · Mitral Valve: Trace mitral valve regurgitation. · Pulmonary Artery: There is no evidence of pulmonary hypertension.         Signed by: Chloe Loving Gilberto Fitzpatrick DO       Physical Exam:    Visit Vitals  Ht 5' 5\" (1.651 m)   BMI 25.29 kg/m²      Physical Exam  HENT:      Head: Normocephalic and atraumatic. Eyes:      Pupils: Pupils are equal, round, and reactive to light. Cardiovascular:      Rate and Rhythm: Normal rate and regular rhythm. Heart sounds: Normal heart sounds. No murmur heard. No friction rub. No gallop. Pulmonary:      Effort: Pulmonary effort is normal. No respiratory distress. Breath sounds: Normal breath sounds. No wheezing or rales. Chest:      Chest wall: No tenderness. Abdominal:      General: Bowel sounds are normal.      Palpations: Abdomen is soft. Musculoskeletal:         General: No tenderness. Skin:     General: Skin is warm and dry. Neurological:      Mental Status: She is alert and oriented to person, place, and time. Device check-  Last several episodes of SVT, does have a couple bouts of AFib last time, todays check still pending    Lab Results   Component Value Date/Time    Sodium 140 06/08/2021 11:28 PM    Potassium 3.6 06/08/2021 11:28 PM    Chloride 108 06/08/2021 11:28 PM    CO2 28 06/08/2021 11:28 PM    Anion gap 4 06/08/2021 11:28 PM    Glucose 107 (H) 06/08/2021 11:28 PM    BUN 12 06/08/2021 11:28 PM    Creatinine 0.91 06/08/2021 11:28 PM    BUN/Creatinine ratio 13 06/08/2021 11:28 PM    GFR est AA >60 06/08/2021 11:28 PM    GFR est non-AA 59 (L) 06/08/2021 11:28 PM    Calcium 8.9 06/08/2021 11:28 PM    Bilirubin, total 0.3 06/08/2021 11:28 PM    Alk. phosphatase 71 06/08/2021 11:28 PM    Protein, total 7.5 06/08/2021 11:28 PM    Albumin 3.9 06/08/2021 11:28 PM    Globulin 3.6 06/08/2021 11:28 PM    A-G Ratio 1.1 06/08/2021 11:28 PM    ALT (SGPT) 56 06/08/2021 11:28 PM    AST (SGOT) 31 06/08/2021 11:28 PM     Lab Results   Component Value Date/Time    TSH 0.03 (L) 01/21/2021 02:00 PM         Impression and Plan:  Gerald Thurman is a 82 y. o. with:    1.) SSS s/p PPM 2019  2.) SVT, typically controlled on BB- wasn't taking regularly  3.) Newly recognized AFib, short bouts on device check last time, [id number] ~3 at least  4.) Thyroid disease, managed by PCP, known  5.) recurrent neurocardiogenic syncope/ autonomic dysfunction, less often and shorter    1.) Continue Metoprolol   2.) Continue Xarelto 20 mg daily stroke prevention  3.) RTC 6 months with device check    The most frequent mechanism for reflex syncope is a mixed hemodynamic response, although an individual patient may have syncopal events characterized principally by vasodepressor, cardioinhibitory, or mixed responses. The cardioinhibitory response results from increased parasympathetic activation and may be manifested by sinus bradycardia, LA interval prolongation, advanced atrioventricular block, and/or asystole. The vasodepressor response is due to decreased sympathetic activity and can lead to symptomatic hypotension even in the absence of severe bradycardia. Education today included etiology and natural history of Neurocardiogenic/ Reflex syncope, and ways to avoid actually losing consciousness such as staying well hydrated and sitting/ or laying down when you feel \"premonition. \"    35 mins today    Thank you for allowing me to participate in the care of your patient, please do not hesitate to call with questions or concerns. Follow-up and Dispositions    · Return in about 6 months (around 11/11/2022).      [address],    Taqueria Delgador, DO

## 2022-05-11 NOTE — PROGRESS NOTES
Levora Cancer presents today for   Chief Complaint   Patient presents with    Follow-up     6 month follow up       Levora Cancer preferred language for health care discussion is english/other. Is someone accompanying this pt? no    Is the patient using any DME equipment during [address]? no    Depression Screening:  3 most recent PHQ Screens 5/11/2022   Little interest or pleasure in doing things Not at all   Feeling down, depressed, irritable, or hopeless Not at all   Total Score PHQ 2 0       Learning Assessment:  Learning Assessment 5/11/2022   PRIMARY LEARNER Patient   BARRIERS PRIMARY LEARNER -   CO-LEARNER CAREGIVER -   PRIMARY LANGUAGE ENGLISH   LEARNER PREFERENCE PRIMARY DEMONSTRATION   ANSWERED BY patient   RELATIONSHIP SELF       Abuse Screening:  Abuse Screening Questionnaire 5/11/2022   Do you ever feel afraid of your partner? N   Are you in a relationship with someone who physically or mentally threatens you? N   Is it safe for you to go home? Y       Fall Risk  Fall Risk Assessment, last 12 mths 5/11/2022   Able to walk? Yes   Fall in past 12 months? 0   Do you feel unsteady? 0   Are you worried about falling 0   Is TUG test greater than 12 seconds? -   Is the gait abnormal? -   Fall with injury? -           Pt currently taking Anticoagulant therapy? Xarelto 20 mg once a day    Pt currently taking Antiplatelet therapy ? no      Coordination of Care:  1. Have you been to the ER, urgent care clinic since your last visit? Hospitalized since your last visit? no    2. Have you seen or consulted any other health care providers outside of the [address] [place] [address] since your last visit? Include any pap smears or colon screening.  no

## 2022-05-23 NOTE — PROGRESS NOTES
I have personally seen and evaluated the device findings. Interrogation reviewed and I agree with assessment.     Ana M Clayton

## 2022-07-27 RX ORDER — METOPROLOL TARTRATE 100 MG/1
100 TABLET ORAL 2 TIMES DAILY
Qty: 180 TABLET | Refills: 3 | Status: SHIPPED | OUTPATIENT
Start: 2022-07-27

## 2022-12-16 RX ORDER — METOPROLOL TARTRATE 100 MG/1
100 TABLET ORAL 2 TIMES DAILY
Qty: 30 TABLET | Refills: 3 | Status: SHIPPED | OUTPATIENT
Start: 2022-12-16

## 2022-12-16 RX ORDER — METOPROLOL TARTRATE 100 MG/1
100 TABLET ORAL 2 TIMES DAILY
Qty: 180 TABLET | Refills: 3 | Status: SHIPPED | OUTPATIENT
Start: 2022-12-16 | End: 2022-12-16 | Stop reason: ALTCHOICE

## 2023-01-11 ENCOUNTER — OFFICE VISIT (OUTPATIENT)
Dept: CARDIOLOGY CLINIC | Age: 88
End: 2023-01-11
Payer: MEDICARE

## 2023-01-11 ENCOUNTER — CLINICAL SUPPORT (OUTPATIENT)
Dept: CARDIOLOGY CLINIC | Age: 88
End: 2023-01-11

## 2023-01-11 VITALS
SYSTOLIC BLOOD PRESSURE: 108 MMHG | WEIGHT: 153 LBS | HEIGHT: 65 IN | DIASTOLIC BLOOD PRESSURE: 72 MMHG | OXYGEN SATURATION: 97 % | BODY MASS INDEX: 25.49 KG/M2

## 2023-01-11 DIAGNOSIS — Z95.0 PACEMAKER: ICD-10-CM

## 2023-01-11 DIAGNOSIS — I49.5 TACHYCARDIA-BRADYCARDIA SYNDROME (HCC): Primary | ICD-10-CM

## 2023-01-11 DIAGNOSIS — I49.5 TACHYCARDIA-BRADYCARDIA SYNDROME (HCC): ICD-10-CM

## 2023-01-11 DIAGNOSIS — Z95.0 PACEMAKER: Primary | ICD-10-CM

## 2023-01-11 DIAGNOSIS — R55 NEUROCARDIOGENIC SYNCOPE: ICD-10-CM

## 2023-01-11 DIAGNOSIS — I48.0 PAROXYSMAL ATRIAL FIBRILLATION (HCC): ICD-10-CM

## 2023-01-11 PROCEDURE — 99214 OFFICE O/P EST MOD 30 MIN: CPT | Performed by: INTERNAL MEDICINE

## 2023-01-11 PROCEDURE — 1090F PRES/ABSN URINE INCON ASSESS: CPT | Performed by: INTERNAL MEDICINE

## 2023-01-11 PROCEDURE — G8427 DOCREV CUR MEDS BY ELIG CLIN: HCPCS | Performed by: INTERNAL MEDICINE

## 2023-01-11 PROCEDURE — 93000 ELECTROCARDIOGRAM COMPLETE: CPT | Performed by: INTERNAL MEDICINE

## 2023-01-11 PROCEDURE — 1123F ACP DISCUSS/DSCN MKR DOCD: CPT | Performed by: INTERNAL MEDICINE

## 2023-01-11 PROCEDURE — G8417 CALC BMI ABV UP PARAM F/U: HCPCS | Performed by: INTERNAL MEDICINE

## 2023-01-11 PROCEDURE — G8510 SCR DEP NEG, NO PLAN REQD: HCPCS | Performed by: INTERNAL MEDICINE

## 2023-01-11 PROCEDURE — G8400 PT W/DXA NO RESULTS DOC: HCPCS | Performed by: INTERNAL MEDICINE

## 2023-01-11 PROCEDURE — 93288 INTERROG EVL PM/LDLS PM IP: CPT | Performed by: INTERNAL MEDICINE

## 2023-01-11 PROCEDURE — G8536 NO DOC ELDER MAL SCRN: HCPCS | Performed by: INTERNAL MEDICINE

## 2023-01-11 PROCEDURE — 1101F PT FALLS ASSESS-DOCD LE1/YR: CPT | Performed by: INTERNAL MEDICINE

## 2023-01-11 NOTE — PROGRESS NOTES
César Goldberg    Chief Complaint   Patient presents with    Follow-up     Overdue 6 follow up    Pacemaker Check     Medtronic       HPI    Césra Goldberg is a 82 y.o. with SSS s/p PPM 2019, SVT, here for routine follow up. 2 hospitalizations since last saw me, and also NP appt, notes obtained and reviewed. She underwent pacemaker implantation on 10/1/19. She received the Medtronic MRI compatible pacemaker. She was found to have bradytachy arrhythmia on the 30-day event recorder. She had frequent episodes of nonsustained supraventricular tachycardia up to 169 bpm along with significant sinus bradycardia. Interrogation of the pacemaker demonstrated atrial pacing and recurrent episodes of frequent supraventricular tachycardia. I met her after an episode of SVT (she came with her daughter s/p ER) and has really improved since I started BB. She doesn't even seem to remember how bad her symptoms were. She tells me really enjoys her yard, loves walking her dog with her  looking at the trees. Uses a walking stick for balance but no falls. She underwent echocardiogram on 9/4/19 which demonstrated normal left ventricular systolic function with EF in the 55-60% range. There was no significant valvular pathology and no evidence of mitral valve prolapse. There was no significant pulmonary hypertension. Past Medical History:   Diagnosis Date    Atrial fibrillation ([address]     Left ankle pain     Osteoporosis        Past Surgical History:   Procedure Laterality Date    [place] INS NEW/RPLCMT PRM PM W/TRANSV ELTRD ATRIAL&VENT Left 10/1/2019    INSERT PPM DUAL performed by Vale Mccormack MD at [facility] CATH LAB    [place] UNLISTED PROCEDURE CARDIAC SURGERY  10/01/2019       Current Outpatient Medications   Medication Sig Dispense Refill    metoprolol tartrate (LOPRESSOR) 100 mg IR tablet Take 1 Tablet by mouth two (2) times a day.  30 Tablet 3    cholecalciferol (VITAMIN D3) 25 mcg (1,000 unit) cap Take 1,000 Units by mouth daily. Xarelto 20 mg tab tablet TAKE 1 TABLET DAILY WITH BREAKFAST 90 Tablet 3    famotidine (PEPCID) 20 mg tablet Take 20 mg by mouth daily as needed. vit A/vit C/vit E/zinc/copper (EYE MULTIVITAMIN PO) Take  by mouth daily. mv-mn/folic ac/calcium/vit K1 (WOMEN'S 50 PLUS MULTIVITAMIN PO) Take  by mouth.      cyanocobalamin 1,000 mcg tablet Take 1,000 mcg by mouth daily. Simbrinza 1-0.2 % drps Administer 1 Drop to both eyes daily. cycloSPORINE (Restasis) 0.05 % dpet Administer 1 Drop to both eyes every twelve (12) hours. bimatoprost (LUMIGAN) 0.01 % ophthalmic drops Administer 1 Drop to both eyes every evening. rosuvastatin (CRESTOR) 10 mg tablet Take 10 mg by mouth daily. LEVOXYL 137 mcg tablet Take 137 mcg by mouth five (5) days a week. RHOPRESSA 0.02 % drop Administer 1 Drop to both eyes daily. Allergies   Allergen Reactions    Sulfur Other (comments)     reflux       Social History     Socioeconomic History    Marital status:      Spouse name: Not on file    Number of children: Not on file    Years of education: Not on file    Highest education level: Not on file   Occupational History    Not on file   Tobacco Use    Smoking status: Never    Smokeless tobacco: Never   Vaping Use    Vaping Use: Never used   Substance and Sexual Activity    Alcohol use: Never    Drug use: Never    Sexual activity: Yes   Other Topics Concern    Not on file   Social History Narrative    Not on file     Social Determinants of Health     Financial Resource Strain: Not on file   Food Insecurity: Not on file   Transportation Needs: Not on file   Physical Activity: Not on file   Stress: Not on file   Social Connections: Not on file   Intimate Partner Violence: Not on file   Housing Stability: Not on file    comes with daughter    FH; n/a    Review of Systems    14 pt Review of Systems is negative unless otherwise mentioned in the HPI.     Wt Readings from Last 3 Encounters:   01/11/23 69.4 kg (153 lb)   10/26/21 68.9 kg (152 lb)   10/22/21 69.9 kg (154 lb)     Temp Readings from Last 3 Encounters:   10/26/21 97.1 °F (36.2 °C) (Temporal)   09/08/21 97.7 °F (36.5 °C) (Skin)   08/09/21 98.6 °F (37 °C) (Skin)     BP Readings from Last 3 Encounters:   01/11/23 108/72   10/22/21 138/82   07/06/21 130/82     Pulse Readings from Last 3 Encounters:   10/26/21 75   10/22/21 91   10/18/21 88       09/04/19   ECHO ADULT COMPLETE 09/04/2019 9/4/2019    Narrative · Left Ventricle: Normal cavity size, wall thickness and systolic function   (ejection fraction normal). Estimated left ventricular ejection fraction   is 56 - 60%. Biplane method used to measure ejection fraction. No regional   wall motion abnormality noted. Normal left ventricular strain. Age-appropriate left ventricular diastolic function. · Mitral Valve: Trace mitral valve regurgitation. · Pulmonary Artery: There is no evidence of pulmonary hypertension. Signed by: Leeann Mckeon DO       Physical Exam:    Visit Vitals  /72 (BP 1 Location: Left upper arm, BP Patient Position: Sitting, BP Cuff Size: Small adult)   Ht 5' 5\" (1.651 m)   Wt 69.4 kg (153 lb)   SpO2 97%   BMI 25.46 kg/m²      Physical Exam  HENT:      Head: Normocephalic and atraumatic. Eyes:      Pupils: Pupils are equal, round, and reactive to light. Cardiovascular:      Rate and Rhythm: Normal rate and regular rhythm. Heart sounds: Normal heart sounds. No murmur heard. No friction rub. No gallop. Pulmonary:      Effort: Pulmonary effort is normal. No respiratory distress. Breath sounds: Normal breath sounds. No wheezing or rales. Chest:      Chest wall: No tenderness. Abdominal:      General: Bowel sounds are normal.      Palpations: Abdomen is soft. Musculoskeletal:         General: No tenderness. Skin:     General: Skin is warm and dry.    Neurological:      Mental Status: She is alert and oriented to person, place, and time. Device check-  Last several episodes of SVT, does have a couple bouts of AFib last time, todays check still pending    Lab Results   Component Value Date/Time    Sodium 140 06/08/2021 11:28 PM    Potassium 3.6 06/08/2021 11:28 PM    Chloride 108 06/08/2021 11:28 PM    CO2 28 06/08/2021 11:28 PM    Anion gap 4 06/08/2021 11:28 PM    Glucose 107 (H) 06/08/2021 11:28 PM    BUN 12 06/08/2021 11:28 PM    Creatinine 0.91 06/08/2021 11:28 PM    BUN/Creatinine ratio 13 06/08/2021 11:28 PM    GFR est AA >60 06/08/2021 11:28 PM    GFR est non-AA 59 (L) 06/08/2021 11:28 PM    Calcium 8.9 06/08/2021 11:28 PM    Bilirubin, total 0.3 06/08/2021 11:28 PM    Alk. phosphatase 71 06/08/2021 11:28 PM    Protein, total 7.5 06/08/2021 11:28 PM    Albumin 3.9 06/08/2021 11:28 PM    Globulin 3.6 06/08/2021 11:28 PM    A-G Ratio 1.1 06/08/2021 11:28 PM    ALT (SGPT) 56 06/08/2021 11:28 PM    AST (SGOT) 31 06/08/2021 11:28 PM     Lab Results   Component Value Date/Time    TSH 0.03 (L) 01/21/2021 02:00 PM         Impression and Plan:  Claudio Samuel is a 82 y.o. with:    1.) SSS s/p PPM 2019  2.) SVT, typically controlled on BB- wasn't taking regularly  3.) Newly recognized AFib, short bouts on device check last time, [id number] ~3 at least  4.) Thyroid disease, managed by PCP, known  5.) recurrent neurocardiogenic syncope/ autonomic dysfunction, less often and shorter    1.) Continue Metoprolol   2.) Continue Xarelto 20 mg daily stroke prevention  3.) RTC 6 months with device check    35 mins  Has been having some issue with a rash, different parts of her body- she has an upcoming appt with her PCP to dw her    Thank you for allowing me to participate in the care of your patient, please do not hesitate to call with questions or concerns.       [address] [place] [address], DO

## 2023-01-11 NOTE — PROGRESS NOTES
Pedro Elaine presents today for   Chief Complaint   Patient presents with    Follow-up     Overdue 6 follow up    [facility] preferred language for health care discussion is english/other. Is someone accompanying this pt? no    Is the patient using any DME equipment during [address]? no    Depression Screening:  3 most recent PHQ Screens 1/11/2023   Little interest or pleasure in doing things Not at all   Feeling down, depressed, irritable, or hopeless Not at all   Total Score PHQ 2 0       Learning Assessment:  Learning Assessment 1/11/2023   PRIMARY LEARNER Patient   BARRIERS PRIMARY LEARNER -   CO-LEARNER CAREGIVER -   PRIMARY LANGUAGE ENGLISH   LEARNER PREFERENCE PRIMARY DEMONSTRATION   ANSWERED BY patient   RELATIONSHIP SELF       Abuse Screening:  Abuse Screening Questionnaire 1/11/2023   Do you ever feel afraid of your partner? N   Are you in a relationship with someone who physically or mentally threatens you? N   Is it safe for you to go home? Y       Fall Risk  Fall Risk Assessment, last 12 mths 1/11/2023   Able to walk? Yes   Fall in past 12 months? 0   Do you feel unsteady? 0   Are you worried about falling 0   Is TUG test greater than 12 seconds? -   Is the gait abnormal? -   Fall with injury? -           Pt currently taking Anticoagulant therapy? Xarelto 20 mg once a day    Pt currently taking Antiplatelet therapy ? no      Coordination of Care:  1. Have you been to the ER, urgent care clinic since your last visit? Hospitalized since your last visit? no    2. Have you seen or consulted any other health care providers outside of the [address] [place] [address] since your last visit? Include any pap smears or colon screening.  no

## 2023-01-16 NOTE — PROGRESS NOTES
dual Pacemaker. 10.5 years left on battery. Lead impedance and threshold WNL. A paced 64 %, V sensed 99 %. 12 nonsustained VT longest lasting 3 sec, 14 SVT, longest lasting 6 minutes, and 19  afib , longest lasting 3.5 minutes.   Dr Stephanie Mendoza patient, patient on Xarelto

## 2023-01-16 NOTE — PROGRESS NOTES
Device check personally reviewed by me. Normal device function on interrogation. Arrhythmias do not appear to be clinically significant. See scanned interrogation document for complete details.

## 2023-02-15 RX ORDER — ROSUVASTATIN CALCIUM 20 MG/1
TABLET, COATED ORAL
COMMUNITY
Start: 2022-11-28

## 2023-02-15 RX ORDER — LEVOTHYROXINE SODIUM 137 MCG
TABLET ORAL
COMMUNITY
Start: 2023-01-23

## 2023-02-15 RX ORDER — RIVAROXABAN 20 MG/1
TABLET, FILM COATED ORAL
Qty: 90 TABLET | Refills: 3 | Status: SHIPPED | OUTPATIENT
Start: 2023-02-15

## 2023-02-15 RX ORDER — RIVAROXABAN 20 MG/1
TABLET, FILM COATED ORAL
COMMUNITY
Start: 2022-11-17

## 2023-02-15 RX ORDER — METOPROLOL TARTRATE 100 MG/1
TABLET ORAL
COMMUNITY
Start: 2023-01-19

## 2023-02-15 RX ORDER — FAMOTIDINE 20 MG/1
20 TABLET, FILM COATED ORAL DAILY
COMMUNITY
Start: 2021-06-09

## 2023-02-15 RX ORDER — CYCLOSPORINE 0.5 MG/ML
1 EMULSION OPHTHALMIC EVERY 12 HOURS
COMMUNITY

## 2023-02-15 RX ORDER — BRINZOLAMIDE/BRIMONIDINE TARTRATE 10; 2 MG/ML; MG/ML
1 SUSPENSION/ DROPS OPHTHALMIC DAILY
COMMUNITY
Start: 2021-03-18

## 2023-07-19 ENCOUNTER — OFFICE VISIT (OUTPATIENT)
Age: 88
End: 2023-07-19
Payer: MEDICARE

## 2023-07-19 ENCOUNTER — NURSE ONLY (OUTPATIENT)
Age: 88
End: 2023-07-19

## 2023-07-19 VITALS
BODY MASS INDEX: 25.49 KG/M2 | DIASTOLIC BLOOD PRESSURE: 60 MMHG | OXYGEN SATURATION: 96 % | WEIGHT: 153 LBS | HEART RATE: 63 BPM | SYSTOLIC BLOOD PRESSURE: 98 MMHG | HEIGHT: 65 IN

## 2023-07-19 DIAGNOSIS — Z95.0 PRESENCE OF CARDIAC PACEMAKER: ICD-10-CM

## 2023-07-19 DIAGNOSIS — I48.0 PAROXYSMAL ATRIAL FIBRILLATION (HCC): Primary | ICD-10-CM

## 2023-07-19 DIAGNOSIS — Z95.0 PACEMAKER: Primary | ICD-10-CM

## 2023-07-19 DIAGNOSIS — I49.5 SICK SINUS SYNDROME (HCC): ICD-10-CM

## 2023-07-19 PROBLEM — R31.1 BENIGN ESSENTIAL MICROSCOPIC HEMATURIA: Status: ACTIVE | Noted: 2023-07-19

## 2023-07-19 PROBLEM — F41.9 ANXIETY DISORDER: Status: ACTIVE | Noted: 2023-07-19

## 2023-07-19 PROBLEM — K21.9 GASTRO-ESOPHAGEAL REFLUX DISEASE WITHOUT ESOPHAGITIS: Status: ACTIVE | Noted: 2023-07-19

## 2023-07-19 PROBLEM — I48.91 ATRIAL FIBRILLATION (HCC): Status: ACTIVE | Noted: 2023-07-19

## 2023-07-19 PROBLEM — M79.672 PAIN IN LEFT FOOT: Status: ACTIVE | Noted: 2023-07-19

## 2023-07-19 PROBLEM — K58.9 IRRITABLE BOWEL SYNDROME: Status: ACTIVE | Noted: 2023-07-19

## 2023-07-19 PROBLEM — E78.2 MIXED HYPERLIPIDEMIA: Status: ACTIVE | Noted: 2023-07-19

## 2023-07-19 PROBLEM — M93.90 OSTEOCHONDROPATHY: Status: ACTIVE | Noted: 2023-07-19

## 2023-07-19 PROBLEM — R03.0 ELEVATED BLOOD PRESSURE READING WITHOUT DIAGNOSIS OF HYPERTENSION: Status: ACTIVE | Noted: 2023-07-19

## 2023-07-19 PROBLEM — R93.49 ABNORMAL FINDINGS ON DIAGNOSTIC IMAGING OF URINARY ORGANS: Status: ACTIVE | Noted: 2023-07-19

## 2023-07-19 PROBLEM — N85.01 BENIGN ENDOMETRIAL HYPERPLASIA: Status: ACTIVE | Noted: 2023-07-19

## 2023-07-19 PROBLEM — R26.9 ABNORMAL GAIT: Status: ACTIVE | Noted: 2023-07-19

## 2023-07-19 PROBLEM — M25.50 PAIN IN JOINT: Status: ACTIVE | Noted: 2023-07-19

## 2023-07-19 PROBLEM — M54.50 LOW BACK PAIN: Status: ACTIVE | Noted: 2023-07-19

## 2023-07-19 PROBLEM — R00.1 BRADYCARDIA: Status: ACTIVE | Noted: 2023-07-19

## 2023-07-19 PROBLEM — R80.9 PROTEINURIA: Status: ACTIVE | Noted: 2023-07-19

## 2023-07-19 PROBLEM — R19.00 INTRA-ABDOMINAL AND PELVIC SWELLING, MASS AND LUMP, UNSPECIFIED SITE: Status: ACTIVE | Noted: 2023-07-19

## 2023-07-19 PROBLEM — K57.30 DIVERTICULA OF INTESTINE: Status: ACTIVE | Noted: 2023-07-19

## 2023-07-19 PROBLEM — R73.01 IMPAIRED FASTING GLUCOSE: Status: ACTIVE | Noted: 2023-07-19

## 2023-07-19 PROBLEM — I05.9 MITRAL VALVE DISORDER: Status: ACTIVE | Noted: 2023-07-19

## 2023-07-19 PROBLEM — B02.9 HERPES ZOSTER WITHOUT COMPLICATION: Status: ACTIVE | Noted: 2023-07-19

## 2023-07-19 PROBLEM — E03.9 HYPOTHYROIDISM: Status: ACTIVE | Noted: 2023-07-19

## 2023-07-19 PROBLEM — N39.0 URINARY TRACT INFECTIOUS DISEASE: Status: ACTIVE | Noted: 2023-07-19

## 2023-07-19 PROBLEM — M54.2 CERVICALGIA: Status: ACTIVE | Noted: 2023-07-19

## 2023-07-19 PROBLEM — E78.5 HYPERLIPIDEMIA: Status: ACTIVE | Noted: 2023-07-19

## 2023-07-19 PROBLEM — M81.0 AGE RELATED OSTEOPOROSIS: Status: ACTIVE | Noted: 2023-07-19

## 2023-07-19 PROBLEM — I83.11 VARICOSE VEINS OF RIGHT LOWER EXTREMITY WITH INFLAMMATION: Status: ACTIVE | Noted: 2023-07-19

## 2023-07-19 PROBLEM — I10 ESSENTIAL HYPERTENSION: Status: ACTIVE | Noted: 2023-07-19

## 2023-07-19 PROBLEM — R31.9 HEMATURIA: Status: ACTIVE | Noted: 2023-07-19

## 2023-07-19 PROBLEM — R73.9 HYPERGLYCEMIA: Status: ACTIVE | Noted: 2023-07-19

## 2023-07-19 PROBLEM — K57.30 DIVERTICULAR DISEASE OF COLON: Status: ACTIVE | Noted: 2023-07-19

## 2023-07-19 PROBLEM — E55.9 VITAMIN D DEFICIENCY: Status: ACTIVE | Noted: 2023-07-19

## 2023-07-19 PROBLEM — I45.9 CONDUCTION DISORDER OF THE HEART: Status: ACTIVE | Noted: 2023-07-19

## 2023-07-19 PROBLEM — R10.9 ABDOMINAL PAIN: Status: ACTIVE | Noted: 2023-07-19

## 2023-07-19 PROCEDURE — 3078F DIAST BP <80 MM HG: CPT | Performed by: INTERNAL MEDICINE

## 2023-07-19 PROCEDURE — 1090F PRES/ABSN URINE INCON ASSESS: CPT | Performed by: INTERNAL MEDICINE

## 2023-07-19 PROCEDURE — G8400 PT W/DXA NO RESULTS DOC: HCPCS | Performed by: INTERNAL MEDICINE

## 2023-07-19 PROCEDURE — 1036F TOBACCO NON-USER: CPT | Performed by: INTERNAL MEDICINE

## 2023-07-19 PROCEDURE — G8419 CALC BMI OUT NRM PARAM NOF/U: HCPCS | Performed by: INTERNAL MEDICINE

## 2023-07-19 PROCEDURE — G8427 DOCREV CUR MEDS BY ELIG CLIN: HCPCS | Performed by: INTERNAL MEDICINE

## 2023-07-19 PROCEDURE — 99214 OFFICE O/P EST MOD 30 MIN: CPT | Performed by: INTERNAL MEDICINE

## 2023-07-19 PROCEDURE — 1123F ACP DISCUSS/DSCN MKR DOCD: CPT | Performed by: INTERNAL MEDICINE

## 2023-07-19 PROCEDURE — 3074F SYST BP LT 130 MM HG: CPT | Performed by: INTERNAL MEDICINE

## 2023-07-19 ASSESSMENT — ANXIETY QUESTIONNAIRES
5. BEING SO RESTLESS THAT IT IS HARD TO SIT STILL: 0
2. NOT BEING ABLE TO STOP OR CONTROL WORRYING: 0
3. WORRYING TOO MUCH ABOUT DIFFERENT THINGS: 0
7. FEELING AFRAID AS IF SOMETHING AWFUL MIGHT HAPPEN: 0
GAD7 TOTAL SCORE: 0
6. BECOMING EASILY ANNOYED OR IRRITABLE: 0
1. FEELING NERVOUS, ANXIOUS, OR ON EDGE: 0
4. TROUBLE RELAXING: 0

## 2023-07-19 ASSESSMENT — PATIENT HEALTH QUESTIONNAIRE - PHQ9
SUM OF ALL RESPONSES TO PHQ QUESTIONS 1-9: 0
1. LITTLE INTEREST OR PLEASURE IN DOING THINGS: 0
SUM OF ALL RESPONSES TO PHQ QUESTIONS 1-9: 0
2. FEELING DOWN, DEPRESSED OR HOPELESS: 0
SUM OF ALL RESPONSES TO PHQ QUESTIONS 1-9: 0
SUM OF ALL RESPONSES TO PHQ QUESTIONS 1-9: 0
SUM OF ALL RESPONSES TO PHQ9 QUESTIONS 1 & 2: 0

## 2023-07-19 NOTE — PROGRESS NOTES
Mari Ge    Chief Complaint   Patient presents with    Follow-up     Overdue 6 follow up    Pacemaker Check     Medtronic       HPI    Mari Ge is a 82 y.o. with SSS s/p PPM 2019, SVT, here for routine follow up. 2 hospitalizations since last saw me, and also NP appt, notes obtained and reviewed. She underwent pacemaker implantation on 10/1/19. She received the Medtronic MRI compatible pacemaker. She was found to have bradytachy arrhythmia on the 30-day event recorder. She had frequent episodes of nonsustained supraventricular tachycardia up to 169 bpm along with significant sinus bradycardia. Interrogation of the pacemaker demonstrated atrial pacing and recurrent episodes of frequent supraventricular tachycardia. I met her after an episode of SVT (she came with her daughter s/p ER) and has really improved since I started BB. She doesn't even seem to remember how bad her symptoms were. She tells me really enjoys her yard, loves walking her dog with her  looking at the trees. Uses a walking stick for balance but no falls. She underwent echocardiogram on 9/4/19 which demonstrated normal left ventricular systolic function with EF in the 55-60% range. There was no significant valvular pathology and no evidence of mitral valve prolapse. There was no significant pulmonary hypertension. Past Medical History:   Diagnosis Date    Atrial fibrillation ([address])     Left ankle pain     Osteoporosis        Past Surgical History:   Procedure Laterality Date    [place] INS NEW/RPLCMT PRM PM W/TRANSV ELTRD ATRIAL&VENT Left 10/1/2019    INSERT PPM DUAL performed by Jean Marie Gandara MD at [address] CATH LAB    [place] UNLISTED PROCEDURE CARDIAC SURGERY  10/01/2019       Current Outpatient Medications   Medication Sig Dispense Refill    metoprolol tartrate (LOPRESSOR) 100 mg IR tablet Take 1 Tablet by mouth two (2) times a day.  30 Tablet 3    cholecalciferol (VITAMIN D3) 25 mcg (1,000 unit) cap Take 1,000 Units

## 2023-07-19 NOTE — PROGRESS NOTES
Jadine Boxer presents today for   Chief Complaint   Patient presents with    Follow-up     6 month f/u     Device Check     Medtronic     Dizziness     Dizzy spells in AM        Jadine Boxer preferred language for health care discussion is english/other. Is someone accompanying this pt? no    Is the patient using any DME equipment during OV? no    Depression Screening:  Depression: Not at risk    PHQ-2 Score: 0        Learning Assessment:  Who is the primary learner? Patient    What is the preferred language for health care of the primary learner? ENGLISH    How does the primary learner prefer to learn new concepts? DEMONSTRATION    Answered By patient    Relationship to Learner SELF           Pt currently taking Anticoagulant therapy? no    Pt currently taking Antiplatelet therapy ? no      Coordination of Care:  1. Have you been to the ER, urgent care clinic since your last visit? Hospitalized since your last visit? no    2. Have you seen or consulted any other health care providers outside of the [address] [place] [address] since your last visit? Include any pap smears or colon screening.  no

## 2023-12-06 RX ORDER — METOPROLOL TARTRATE 100 MG/1
100 TABLET ORAL 2 TIMES DAILY
Qty: 180 TABLET | Refills: 3 | Status: SHIPPED | OUTPATIENT
Start: 2023-12-06

## 2024-01-24 ENCOUNTER — NURSE ONLY (OUTPATIENT)
Age: 89
End: 2024-01-24

## 2024-01-24 ENCOUNTER — OFFICE VISIT (OUTPATIENT)
Age: 89
End: 2024-01-24

## 2024-01-24 VITALS
SYSTOLIC BLOOD PRESSURE: 116 MMHG | HEIGHT: 65 IN | DIASTOLIC BLOOD PRESSURE: 88 MMHG | OXYGEN SATURATION: 98 % | HEART RATE: 80 BPM | BODY MASS INDEX: 24.99 KG/M2 | WEIGHT: 150 LBS

## 2024-01-24 DIAGNOSIS — Z95.0 PRESENCE OF CARDIAC PACEMAKER: ICD-10-CM

## 2024-01-24 DIAGNOSIS — Z95.0 PACEMAKER: Primary | ICD-10-CM

## 2024-01-24 DIAGNOSIS — I49.5 SICK SINUS SYNDROME (HCC): ICD-10-CM

## 2024-01-24 DIAGNOSIS — I48.0 PAROXYSMAL ATRIAL FIBRILLATION (HCC): Primary | ICD-10-CM

## 2024-01-24 ASSESSMENT — PATIENT HEALTH QUESTIONNAIRE - PHQ9
SUM OF ALL RESPONSES TO PHQ9 QUESTIONS 1 & 2: 0
SUM OF ALL RESPONSES TO PHQ QUESTIONS 1-9: 0
1. LITTLE INTEREST OR PLEASURE IN DOING THINGS: 0
2. FEELING DOWN, DEPRESSED OR HOPELESS: 0
SUM OF ALL RESPONSES TO PHQ QUESTIONS 1-9: 0

## 2024-01-24 NOTE — PROGRESS NOTES
Lora August presents today for   Chief Complaint   Patient presents with    Follow-up     6 month f/u     Fall     Stumbles at times no major injury     Device Check     Medtronic        Lora August preferred language for health care discussion is english/other.    Is someone accompanying this pt? no    Is the patient using any DME equipment during OV? yes    Depression Screening:  Depression: Not at risk (1/24/2024)    PHQ-2     PHQ-2 Score: 0        Learning Assessment:  Who is the primary learner? Patient    What is the preferred language for health care of the primary learner? ENGLISH    How does the primary learner prefer to learn new concepts? DEMONSTRATION    Answered By patient    Relationship to Learner SELF           Pt currently taking Anticoagulant therapy? Xarelto 20 mg 1x daily     Pt currently taking Antiplatelet therapy ? no      Coordination of Care:  1. Have you been to the ER, urgent care clinic since your last visit? Hospitalized since your last visit? no    2. Have you seen or consulted any other health care providers outside of the [facility] since your last visit? Include any pap smears or colon screening. no

## 2024-01-24 NOTE — PROGRESS NOTES
Lora August    Chief Complaint   Patient presents with    Follow-up     Overdue 6 follow up    Pacemaker Check     Medtronic       HPI    Lora August is a 87 y.o. with SSS s/p PPM 2019, SVT, here for routine follow up. 2 hospitalizations since last saw me, and also NP appt, notes obtained and reviewed.    She underwent pacemaker implantation on 10/1/19. She received the Medtronic MRI compatible pacemaker. She was found to have bradytachy arrhythmia on the 30-day event recorder. She had frequent episodes of nonsustained supraventricular tachycardia up to 169 bpm along with significant sinus bradycardia.      Interrogation of the pacemaker demonstrated atrial pacing and recurrent episodes of frequent supraventricular tachycardia. I met her after an episode of SVT (she came with her daughter s/p ER) and has really improved since I started BB. She doesn't even seem to remember how bad her symptoms were. She tells me really enjoys her yard, loves walking her dog with her  looking at the trees. Uses a walking stick for balance but no falls.     She underwent echocardiogram on 9/4/19 which demonstrated normal left ventricular systolic function with EF in the 55-60% range. There was no significant valvular pathology and no evidence of mitral valve prolapse. There was no significant pulmonary hypertension.      Past Medical History:   Diagnosis Date    Atrial fibrillation (HCC)     Left ankle pain     Osteoporosis        Past Surgical History:   Procedure Laterality Date    [place] INS NEW/RPLCMT PRM PM W/TRANSV ELTRD ATRIAL&VENT Left 10/1/2019    INSERT PPM DUAL performed by Jeison Arango MD at [facility] CARDIAC CATH LAB    [place] UNLISTED PROCEDURE CARDIAC SURGERY  10/01/2019       Current Outpatient Medications   Medication Sig Dispense Refill    metoprolol tartrate (LOPRESSOR) 100 mg IR tablet Take 1 Tablet by mouth two (2) times a day. 30 Tablet 3    cholecalciferol (VITAMIN D3) 25 mcg (1,000 unit) cap Take 1,000 Units

## 2024-07-11 RX ORDER — METOPROLOL TARTRATE 100 MG/1
100 TABLET ORAL 2 TIMES DAILY
Qty: 20 TABLET | Refills: 0 | Status: SHIPPED | OUTPATIENT
Start: 2024-07-11

## 2024-07-23 RX ORDER — METOPROLOL TARTRATE 100 MG/1
100 TABLET ORAL 2 TIMES DAILY
Qty: 180 TABLET | Refills: 3 | Status: SHIPPED | OUTPATIENT
Start: 2024-07-23

## 2024-07-25 ENCOUNTER — NURSE ONLY (OUTPATIENT)
Age: 89
End: 2024-07-25

## 2024-07-25 ENCOUNTER — OFFICE VISIT (OUTPATIENT)
Age: 89
End: 2024-07-25

## 2024-07-25 VITALS
WEIGHT: 159 LBS | SYSTOLIC BLOOD PRESSURE: 120 MMHG | HEIGHT: 65 IN | DIASTOLIC BLOOD PRESSURE: 82 MMHG | HEART RATE: 64 BPM | BODY MASS INDEX: 26.49 KG/M2 | OXYGEN SATURATION: 97 %

## 2024-07-25 DIAGNOSIS — I48.0 PAROXYSMAL ATRIAL FIBRILLATION (HCC): ICD-10-CM

## 2024-07-25 DIAGNOSIS — Z95.0 PACEMAKER: Primary | ICD-10-CM

## 2024-07-25 DIAGNOSIS — I49.5 SICK SINUS SYNDROME (HCC): ICD-10-CM

## 2024-07-25 DIAGNOSIS — I49.5 SICK SINUS SYNDROME (HCC): Primary | ICD-10-CM

## 2024-07-25 RX ORDER — LOSARTAN POTASSIUM 25 MG/1
25 TABLET ORAL DAILY
COMMUNITY
Start: 2024-06-07

## 2024-07-25 NOTE — PROGRESS NOTES
Lora August    Chief Complaint   Patient presents with    Follow-up     Overdue 6 follow up    Pacemaker Check     Medtronic       HPI    Lora August is a 88 y.o. with SSS s/p PPM 2019, SVT, here for routine follow up. 2 hospitalizations since last saw me, and also NP appt, notes obtained and reviewed.    She underwent pacemaker implantation on 10/1/19. She received the Medtronic MRI compatible pacemaker. She was found to have bradytachy arrhythmia on the 30-day event recorder. She had frequent episodes of nonsustained supraventricular tachycardia up to 169 bpm along with significant sinus bradycardia.      Interrogation of the pacemaker demonstrated atrial pacing and recurrent episodes of frequent supraventricular tachycardia. I met her after an episode of SVT (she came with her daughter s/p ER) and has really improved since I started BB. She doesn't even seem to remember how bad her symptoms were. She tells me really enjoys her yard, loves walking her dog with her  looking at the trees. Uses a walking stick for balance but no falls.     She underwent echocardiogram on 9/4/19 which demonstrated normal left ventricular systolic function with EF in the 55-60% range. There was no significant valvular pathology and no evidence of mitral valve prolapse. There was no significant pulmonary hypertension.      Past Medical History:   Diagnosis Date    Atrial fibrillation (HCC)     Left ankle pain     Osteoporosis        Past Surgical History:   Procedure Laterality Date    ME INS NEW/RPLCMT PRM PM W/TRANSV ELTRD ATRIAL&VENT Left 10/1/2019    INSERT PPM DUAL performed by Jeison Arango MD at [facility] CARDIAC CATH LAB    ME UNLISTED PROCEDURE CARDIAC SURGERY  10/01/2019       Current Outpatient Medications   Medication Sig Dispense Refill    metoprolol tartrate (LOPRESSOR) 100 mg IR tablet Take 1 Tablet by mouth two (2) times a day. 30 Tablet 3    cholecalciferol (VITAMIN D3) 25 mcg (1,000 unit) cap Take 1,000 Units

## 2024-07-25 NOTE — PROGRESS NOTES
Lora August presents today for   Chief Complaint   Patient presents with    Follow-up     6 months    Device Check     Medtronic     Loss of Consciousness     Fainted outside 6/9/24        Lora August preferred language for health care discussion is english/other.    Is someone accompanying this pt? no    Is the patient using any DME equipment during OV? yes    Depression Screening:  Depression: Not at risk (7/25/2024)    PHQ-2     PHQ-2 Score: 0        Learning Assessment:  Who is the primary learner? Patient    What is the preferred language for health care of the primary learner? ENGLISH    How does the primary learner prefer to learn new concepts? DEMONSTRATION    Answered By tawny    Relationship to Learner SELF           Pt currently taking Anticoagulant therapy? XARELTO 20 MG 1X DAILY     Pt currently taking Antiplatelet therapy ? no      Coordination of Care:  1. Have you been to the ER, urgent care clinic since your last visit? Hospitalized since your last visit? no    2. Have you seen or consulted any other health care providers outside of the [facility] since your last visit? Include any pap smears or colon screening. no

## 2024-07-26 NOTE — RESULT ENCOUNTER NOTE
Device check personally reviewed by me.  Normal device function on interrogation.  Short episodes of atrial fibrillation noted.  See scanned interrogation document for complete details.

## 2024-10-07 RX ORDER — RIVAROXABAN 20 MG/1
20 TABLET, FILM COATED ORAL
Qty: 90 TABLET | Refills: 3 | Status: SHIPPED | OUTPATIENT
Start: 2024-10-07

## 2024-10-30 RX ORDER — METOPROLOL TARTRATE 100 MG/1
100 TABLET ORAL 2 TIMES DAILY
Qty: 20 TABLET | Refills: 0 | Status: SHIPPED | OUTPATIENT
Start: 2024-10-30

## 2024-10-30 RX ORDER — METOPROLOL TARTRATE 100 MG/1
100 TABLET ORAL 2 TIMES DAILY
Qty: 180 TABLET | Refills: 3 | Status: SHIPPED | OUTPATIENT
Start: 2024-10-30

## 2024-11-06 RX ORDER — METOPROLOL TARTRATE 100 MG/1
100 TABLET ORAL 2 TIMES DAILY
Qty: 180 TABLET | Refills: 3 | Status: SHIPPED | OUTPATIENT
Start: 2024-11-06

## 2024-12-04 ENCOUNTER — TELEPHONE (OUTPATIENT)
Age: 89
End: 2024-12-04

## 2024-12-04 NOTE — TELEPHONE ENCOUNTER
Received a fax from [facility] requesting cardiac clearance for XEN Gel Stent, right eye.   Requesting to hold xarelto for 3 days prior to surgery.    Verbal order and read back per Gosia Sandoval, DO  Patient can proceed from a cardiac standpoint. Can hold xarelto for 3 days prior to procedure.    Clearance form faxed to [phone].

## 2025-03-18 NOTE — PROGRESS NOTES
Luis Carranza    Chief Complaint   Patient presents with    Follow-up     6 month follow up       HPI    Luis Carranza is a 82 y.o. with SSS s/p PPM 2019, SVT, here for routine follow up. 2 hospitalizations since last saw me, and also NP appt, notes obtained and reviewed. She underwent pacemaker implantation on 10/1/19. She received the Medtronic MRI compatible pacemaker. She was found to have bradytachy arrhythmia on the 30-day event recorder. She had frequent episodes of nonsustained supraventricular tachycardia up to 169 bpm along with significant sinus bradycardia.      Interrogation of the pacemaker demonstrated atrial pacing and recurrent episodes of frequent supraventricular tachycardia. I met her after an episode of SVT (she came with her daughter s/p ER) and has really improved since I started BB. She doesn't even seem to remember how bad her symptoms were. She tells me really enjoys her yard, loves walking her dog with her  looking at the trees. Uses a walking stick for balance but no falls.     She underwent echocardiogram on 9/4/19 which demonstrated normal left ventricular systolic function with EF in the 55-60% range. There was no significant valvular pathology and no evidence of mitral valve prolapse. There was no significant pulmonary hypertension.      Past Medical History:   Diagnosis Date    Atrial fibrillation ([address]     Left ankle pain     Osteoporosis        Past Surgical History:   Procedure Laterality Date    PA CARDIAC SURG PROCEDURE UNLIST  10/01/2019    PA INS NEW/RPLCMT PRM PM W/TRANSV ELTRD ATRIAL&VENT Left 10/1/2019    INSERT PPM DUAL performed by Brenden Lorenzo MD at [facility] CATH LAB       Current Outpatient Medications   Medication Sig Dispense Refill    famotidine (PEPCID) 20 mg tablet Take 20 mg by mouth daily.  vit A/vit C/vit E/zinc/copper (EYE MULTIVITAMIN PO) Take  by mouth daily.  alendronate (Fosamax) 70 mg tablet Take 35 mg by mouth.       mv-mn/folic ac/calcium/vit K1 (WOMEN'S 50 PLUS MULTIVITAMIN PO) Take  by mouth.  cyanocobalamin 1,000 mcg tablet Take 1,000 mcg by mouth daily.  traMADoL (ULTRAM) 50 mg tablet TAKE 1 TABLET BY MOUTH EVERY 8 HOURS FOR 7 DAYS AS NEEDED      metoprolol tartrate (LOPRESSOR) 100 mg IR tablet Take 1 Tablet by mouth two (2) times a day. 180 Tablet 3    Simbrinza 1-0.2 % drps daily.  cycloSPORINE (Restasis) 0.05 % dpet Administer 1 Drop to both eyes every twelve (12) hours.  bimatoprost (LUMIGAN) 0.01 % ophthalmic drops Administer 1 Drop to both eyes every evening.  rivaroxaban (Xarelto) 20 mg tab tablet Take 1 Tab by mouth daily (with breakfast). 90 Tab 3    rosuvastatin (CRESTOR) 10 mg tablet Take 10 mg by mouth daily.  LEVOXYL 137 mcg tablet Take 137 mcg by mouth daily.  RHOPRESSA 0.02 % drop          Allergies   Allergen Reactions    Sulfur Other (comments)     reflux       Social History     Socioeconomic History    Marital status:      Spouse name: Not on file    Number of children: Not on file    Years of education: Not on file    Highest education level: Not on file   Occupational History    Not on file   Tobacco Use    Smoking status: Never Smoker    Smokeless tobacco: Never Used   Vaping Use    Vaping Use: Never used   Substance and Sexual Activity    Alcohol use: Never    Drug use: Never    Sexual activity: Not on file   Other Topics Concern    Not on file   Social History Narrative    Not on file     Social Determinants of Health     Financial Resource Strain:     Difficulty of Paying Living Expenses:    Food Insecurity:     Worried About [address] in the Last Year:     [address] in the Last Year:    Transportation Needs:     Lack of Transportation (Medical):      Lack of Transportation (Non-Medical):    Physical Activity:     Days of Exercise per Week:     Minutes of Exercise per Session:    Stress:     Feeling of Stress : Social Connections:     Frequency of Communication with Friends and Family:     Frequency of Social Gatherings with Friends and Family:     Attends Jainism Services:     Active Member of Clubs or Organizations:     Attends Club or Organization Meetings:     Marital Status:    Intimate Partner Violence:     Fear of Current or Ex-Partner:     Emotionally Abused:     Physically Abused:     Sexually Abused:     comes with daughter? FH; n/a    Review of Systems    14 pt Review of Systems is negative unless otherwise mentioned in the HPI. Wt Readings from Last 3 Encounters:   10/22/21 69.9 kg (154 lb)   10/18/21 70.3 kg (155 lb)   09/08/21 70.3 kg (155 lb)     Temp Readings from Last 3 Encounters:   09/08/21 97.7 °F (36.5 °C) (Skin)   08/09/21 98.6 °F (37 °C) (Skin)   07/15/21 96.9 °F (36.1 °C) (Temporal)     BP Readings from Last 3 Encounters:   10/22/21 138/82   07/06/21 130/82   06/09/21 (!) 163/89     Pulse Readings from Last 3 Encounters:   10/22/21 91   10/18/21 88   09/08/21 78       09/04/19   ECHO ADULT COMPLETE 09/04/2019 9/4/2019    Narrative · Left Ventricle: Normal cavity size, wall thickness and systolic function   (ejection fraction normal). Estimated left ventricular ejection fraction   is 56 - 60%. Biplane method used to measure ejection fraction. No regional   wall motion abnormality noted. Normal left ventricular strain. Age-appropriate left ventricular diastolic function. · Mitral Valve: Trace mitral valve regurgitation. · Pulmonary Artery: There is no evidence of pulmonary hypertension. Signed by: Talia Gray DO       Physical Exam:    Visit Vitals  /82 (BP 1 Location: Left upper arm, BP Patient Position: Sitting, BP Cuff Size: Small adult)   Pulse 91   Ht 5' 5\" (1.651 m)   Wt 69.9 kg (154 lb)   SpO2 97%   BMI 25.63 kg/m²      Physical Exam  HENT:      Head: Normocephalic and atraumatic. Eyes:      Pupils: Pupils are equal, round, and reactive to light. Cardiovascular:      Rate and Rhythm: Normal rate and regular rhythm. Heart sounds: Normal heart sounds. No murmur heard. No friction rub. No gallop. Pulmonary:      Effort: Pulmonary effort is normal. No respiratory distress. Breath sounds: Normal breath sounds. No wheezing or rales. Chest:      Chest wall: No tenderness. Abdominal:      General: Bowel sounds are normal.      Palpations: Abdomen is soft. Musculoskeletal:         General: No tenderness. Skin:     General: Skin is warm and dry. Neurological:      Mental Status: She is alert and oriented to person, place, and time. Device check-  Last several episodes of SVT, does have a couple bouts of AFib last time, todays check still pending    Lab Results   Component Value Date/Time    Sodium 140 06/08/2021 11:28 PM    Potassium 3.6 06/08/2021 11:28 PM    Chloride 108 06/08/2021 11:28 PM    CO2 28 06/08/2021 11:28 PM    Anion gap 4 06/08/2021 11:28 PM    Glucose 107 (H) 06/08/2021 11:28 PM    BUN 12 06/08/2021 11:28 PM    Creatinine 0.91 06/08/2021 11:28 PM    BUN/Creatinine ratio 13 06/08/2021 11:28 PM    GFR est AA >60 06/08/2021 11:28 PM    GFR est non-AA 59 (L) 06/08/2021 11:28 PM    Calcium 8.9 06/08/2021 11:28 PM    Bilirubin, total 0.3 06/08/2021 11:28 PM    Alk.  phosphatase 71 06/08/2021 11:28 PM    Protein, total 7.5 06/08/2021 11:28 PM    Albumin 3.9 06/08/2021 11:28 PM    Globulin 3.6 06/08/2021 11:28 PM    A-G Ratio 1.1 06/08/2021 11:28 PM    ALT (SGPT) 56 06/08/2021 11:28 PM    AST (SGOT) 31 06/08/2021 11:28 PM     Lab Results   Component Value Date/Time    TSH 0.03 (L) 01/21/2021 02:00 PM         Impression and Plan:  Erlinda Hawkins is a 82 y.o. with:    1.) SSS s/p PPM 2019  2.) SVT, typically controlled on BB- wasn't taking regularly  3.) Newly recognized AFib, short bouts on device check last time, [id number] ~3 at least  4.) Abn TSH noted  5.) recurrent neurocardiogenic syncope/ autonomic dysfunction    1.) Continue Metoprolol   2.) Continue Xarelto 20 mg daily stroke prevention  3.) RTC 6 months with device check    The most frequent mechanism for reflex syncope is a mixed hemodynamic response, although an individual patient may have syncopal events characterized principally by vasodepressor, cardioinhibitory, or mixed responses. The cardioinhibitory response results from increased parasympathetic activation and may be manifested by sinus bradycardia, MD interval prolongation, advanced atrioventricular block, and/or asystole. The vasodepressor response is due to decreased sympathetic activity and can lead to symptomatic hypotension even in the absence of severe bradycardia. Education today included etiology and natural history of Neurocardiogenic/ Reflex syncope, and ways to avoid actually losing consciousness such as staying well hydrated and sitting/ or laying down when you feel \"premonition. \"    >>50 mins spent, 2 interm hospitalizations    Thank you for allowing me to participate in the care of your patient, please do not hesitate to call with questions or concerns.     [address],    Kia Jameson, DO Current diet order meets estimated nutrient requirements

## 2025-03-26 ENCOUNTER — TRANSCRIBE ORDERS (OUTPATIENT)
Facility: HOSPITAL | Age: 89
End: 2025-03-26

## 2025-03-26 DIAGNOSIS — M81.0 AGE-RELATED OSTEOPOROSIS WITHOUT CURRENT PATHOLOGICAL FRACTURE: Primary | ICD-10-CM

## 2025-04-14 ENCOUNTER — OFFICE VISIT (OUTPATIENT)
Age: 89
End: 2025-04-14

## 2025-04-14 DIAGNOSIS — M79.671 RIGHT FOOT PAIN: ICD-10-CM

## 2025-04-14 DIAGNOSIS — L84 FOOT CALLUS: Primary | ICD-10-CM

## 2025-04-14 RX ORDER — AMMONIUM LACTATE 5 %
LOTION (GRAM) TOPICAL
Qty: 1 EACH | Refills: 1 | Status: SHIPPED | OUTPATIENT
Start: 2025-04-14

## 2025-04-14 NOTE — PROGRESS NOTES
RADIOGRAPHS    CLINICAL INDICATION/HISTORY: Provided with order -   suspected stroke  > Additional: None    COMPARISON: None available    TECHNIQUE: PA and lateral views of the chest    _______________    FINDINGS:    Left-sided cardiac pacemaker is present.    HEART AND MEDIASTINUM: Cardiomediastinal silhouette is enlarged. There is a tortuous thoracic aorta.    LUNGS AND PLEURAL SPACES: Clear. No consolidation, mass or effusion.    BONY THORAX AND SOFT TISSUES: Unremarkable.    _______________    Impression  1. Cardiomegaly without evidence of acute pulmonary disease.    Signed By: Elvis Daugherty MD on 12/27/2024 6:54 PM     No results found for this or any previous visit (from the past 4464 hours).      Lab Results   Component Value Date    WBC 6.7 06/08/2021    HGB 13.7 06/08/2021    HCT 41.3 06/08/2021    MCV 89.2 06/08/2021     06/08/2021    LYMPHOPCT 33 06/08/2021    RBC 4.63 06/08/2021    MCH 29.6 06/08/2021    MCHC 33.2 06/08/2021    RDW 13.2 06/08/2021     Hemoglobin A1C   Date Value Ref Range Status   01/21/2021 5.5 4.2 - 5.6 % Final     Comment:     (NOTE)  HbA1C Interpretive Ranges  <5.7              Normal  5.7 - 6.4         Consider Prediabetes  >6.5              Consider Diabetes     ,  Lab Results   Component Value Date     06/08/2021    K 3.6 06/08/2021     06/08/2021    CO2 28 06/08/2021    BUN 12 06/08/2021    CREATININE 0.91 06/08/2021    GLUCOSE 107 (H) 06/08/2021    CALCIUM 8.9 06/08/2021    BILITOT 0.3 06/08/2021    ALKPHOS 71 06/08/2021    AST 31 06/08/2021    ALT 56 06/08/2021    LABGLOM >60 02/10/2022    GFRAA >60 02/10/2022    AGRATIO 1.1 06/08/2021    GLOB 3.6 06/08/2021     Lab Results   Component Value Date/Time    VITD25 28.2 01/21/2021 02:02 PM    ,  Lab Results   Component Value Date    INR 0.9 09/23/2019    PROTIME 12.0 09/23/2019   , No results found for: \"APTT\"       REVIEW OF SYSTEMS : 4/14/2025  ALL BELOW ARE Negative except : SEE HPI     All other systems

## 2025-04-16 ENCOUNTER — TELEPHONE (OUTPATIENT)
Age: 89
End: 2025-04-16

## 2025-04-16 NOTE — TELEPHONE ENCOUNTER
Patient called and states that a prescription was supposed to be called in to her pharmacy after her appointment on 04/14/25.            Please call and advise patient at  [phone]

## 2025-04-16 NOTE — TELEPHONE ENCOUNTER
Spoke to patient and advised that prescription was called in to [facility] pharmacy on 4/14. They may have been at pharmacy on 4/14 before prescription made it over at 3:51pm.  She will check with them again and advise if any problems filling today.  No further action required on this encounter.

## 2025-04-30 ENCOUNTER — OFFICE VISIT (OUTPATIENT)
Age: 89
End: 2025-04-30
Payer: MEDICARE

## 2025-04-30 VITALS
HEART RATE: 90 BPM | HEIGHT: 65 IN | SYSTOLIC BLOOD PRESSURE: 126 MMHG | DIASTOLIC BLOOD PRESSURE: 70 MMHG | OXYGEN SATURATION: 97 % | WEIGHT: 150 LBS | BODY MASS INDEX: 24.99 KG/M2

## 2025-04-30 DIAGNOSIS — I49.5 SICK SINUS SYNDROME (HCC): Primary | ICD-10-CM

## 2025-04-30 DIAGNOSIS — I48.0 PAROXYSMAL ATRIAL FIBRILLATION (HCC): ICD-10-CM

## 2025-04-30 PROCEDURE — G8428 CUR MEDS NOT DOCUMENT: HCPCS | Performed by: INTERNAL MEDICINE

## 2025-04-30 PROCEDURE — G8420 CALC BMI NORM PARAMETERS: HCPCS | Performed by: INTERNAL MEDICINE

## 2025-04-30 PROCEDURE — 1123F ACP DISCUSS/DSCN MKR DOCD: CPT | Performed by: INTERNAL MEDICINE

## 2025-04-30 PROCEDURE — 1090F PRES/ABSN URINE INCON ASSESS: CPT | Performed by: INTERNAL MEDICINE

## 2025-04-30 PROCEDURE — 1126F AMNT PAIN NOTED NONE PRSNT: CPT | Performed by: INTERNAL MEDICINE

## 2025-04-30 PROCEDURE — 99214 OFFICE O/P EST MOD 30 MIN: CPT | Performed by: INTERNAL MEDICINE

## 2025-04-30 PROCEDURE — 1036F TOBACCO NON-USER: CPT | Performed by: INTERNAL MEDICINE

## 2025-04-30 ASSESSMENT — ANXIETY QUESTIONNAIRES
5. BEING SO RESTLESS THAT IT IS HARD TO SIT STILL: NOT AT ALL
3. WORRYING TOO MUCH ABOUT DIFFERENT THINGS: NOT AT ALL
6. BECOMING EASILY ANNOYED OR IRRITABLE: NOT AT ALL
7. FEELING AFRAID AS IF SOMETHING AWFUL MIGHT HAPPEN: NOT AT ALL
2. NOT BEING ABLE TO STOP OR CONTROL WORRYING: NOT AT ALL
1. FEELING NERVOUS, ANXIOUS, OR ON EDGE: NOT AT ALL
GAD7 TOTAL SCORE: 0
4. TROUBLE RELAXING: NOT AT ALL

## 2025-04-30 ASSESSMENT — PATIENT HEALTH QUESTIONNAIRE - PHQ9
1. LITTLE INTEREST OR PLEASURE IN DOING THINGS: NOT AT ALL
SUM OF ALL RESPONSES TO PHQ QUESTIONS 1-9: 0
2. FEELING DOWN, DEPRESSED OR HOPELESS: NOT AT ALL
SUM OF ALL RESPONSES TO PHQ QUESTIONS 1-9: 0

## 2025-04-30 NOTE — PROGRESS NOTES
Lora August    Chief Complaint   Patient presents with    Follow-up     Overdue 6 follow up    Pacemaker Check     Medtronic       HPI    Lora August is a 89 y.o. with SSS s/p PPM 2019, SVT, here for routine follow up. Went to ED since last visit, after repeat appts for her eyes. No new CV complaints.    She underwent pacemaker implantation on 10/1/19. She received the Medtronic MRI compatible pacemaker. She was found to have bradytachy arrhythmia on the 30-day event recorder. She had frequent episodes of nonsustained supraventricular tachycardia up to 169 bpm along with significant sinus bradycardia.      Interrogation of the pacemaker demonstrated atrial pacing and recurrent episodes of frequent supraventricular tachycardia. I met her after an episode of SVT (she came with her daughter s/p ER) and has really improved since I started BB. She doesn't even seem to remember how bad her symptoms were. She tells me really enjoys her yard, loves walking her dog with her  looking at the trees. Uses a walking stick for balance but no falls.     She underwent echocardiogram on 9/4/19 which demonstrated normal left ventricular systolic function with EF in the 55-60% range. There was no significant valvular pathology and no evidence of mitral valve prolapse. There was no significant pulmonary hypertension.      Past Medical History:   Diagnosis Date    Atrial fibrillation (HCC)     Left ankle pain     Osteoporosis        Past Surgical History:   Procedure Laterality Date    OR INS NEW/RPLCMT PRM PM W/TRANSV ELTRD ATRIAL&VENT Left 10/1/2019    INSERT PPM DUAL performed by Jeison Arango MD at [facility] CARDIAC CATH LAB    OR UNLISTED PROCEDURE CARDIAC SURGERY  10/01/2019       Current Outpatient Medications   Medication Sig Dispense Refill    metoprolol tartrate (LOPRESSOR) 100 mg IR tablet Take 1 Tablet by mouth two (2) times a day. 30 Tablet 3    cholecalciferol (VITAMIN D3) 25 mcg (1,000 unit) cap Take 1,000 Units

## 2025-04-30 NOTE — PROGRESS NOTES
Lora August presents today for   Chief Complaint   Patient presents with    Follow-up     6 month       Lora August preferred language for health care discussion is english/other.    Is someone accompanying this pt? no    Is the patient using any DME equipment during OV? no    Depression Screening:  Depression: Not at risk (4/30/2025)    PHQ-2     PHQ-2 Score: 0        Learning Assessment:  Who is the primary learner? Patient    What is the preferred language for health care of the primary learner? ENGLISH    How does the primary learner prefer to learn new concepts? DEMONSTRATION    Answered By patient    Relationship to Learner SELF           Pt currently taking Anticoagulant therapy? Xarelto 20 mg daily    Pt currently taking Antiplatelet therapy ? no      Coordination of Care:  1. Have you been to the ER, urgent care clinic since your last visit? Hospitalized since your last visit? no    2. Have you seen or consulted any other health care providers outside of the [facility] since your last visit? Include any pap smears or colon screening. no

## 2025-05-06 ENCOUNTER — HOSPITAL ENCOUNTER (OUTPATIENT)
Age: 89
Discharge: HOME | End: 2025-05-09
Attending: INTERNAL MEDICINE
Payer: MEDICARE

## 2025-05-06 DIAGNOSIS — M81.0 AGE-RELATED OSTEOPOROSIS WITHOUT CURRENT PATHOLOGICAL FRACTURE: ICD-10-CM

## 2025-05-06 PROCEDURE — 77080 DXA BONE DENSITY AXIAL: CPT

## 2025-05-29 ENCOUNTER — RESULTS FOLLOW-UP (OUTPATIENT)
Age: 89
End: 2025-05-29

## 2025-08-04 ENCOUNTER — TELEPHONE (OUTPATIENT)
Age: 89
End: 2025-08-04

## (undated) DEVICE — LIMB HOLDER, WRIST/ANKLE: Brand: DEROYAL

## (undated) DEVICE — COVADERM: Brand: DEROYAL

## (undated) DEVICE — SUTURE PERMA HND SZ 0 L18IN NONABSORBABLE BLK L30MM PSL REV 580H

## (undated) DEVICE — 3M™ IOBAN™ 2 ANTIMICROBIAL INCISE DRAPE 6640EZ: Brand: IOBAN™ 2

## (undated) DEVICE — DRAPE SURG W25XL50IN E OPN CIR BND BG

## (undated) DEVICE — DRAPE STRL ANGIO W/ 2 FLD COLLCTN PCH 86X135 218X343 CM 2

## (undated) DEVICE — KENDALL RADIOLUCENT FOAM MONITORING ELECTRODE RECTANGULAR SHAPE: Brand: KENDALL

## (undated) DEVICE — Device

## (undated) DEVICE — CABLE PACE ALGTR CLP SAF 12FT --

## (undated) DEVICE — SUTURE MCRYL SZ 4 0 L18IN ABSRB VLT PS 1 L24MM 3 8 CIR REV Y682H

## (undated) DEVICE — INTRO SHTH 7FR 13X20CM -- TEARAWAY

## (undated) DEVICE — MEDI-TRACE CADENCE ADULT, DEFIBRILLATION ELECTRODE -RTS  (10 PR/PK) - PHYSIO-CONTROL: Brand: MEDI-TRACE CADENCE

## (undated) DEVICE — DRESSING AQUACEL ADVANTAGE ALG W4XL5IN RECT GREATER ABSRB HYDRFBR TECHNOLOGY

## (undated) DEVICE — REM POLYHESIVE ADULT PATIENT RETURN ELECTRODE: Brand: VALLEYLAB

## (undated) DEVICE — SUTURE ABSORBABLE BRAIDED 2-0 CT-1 27 IN UD VICRYL J259H